# Patient Record
Sex: MALE | Race: WHITE | Employment: OTHER | URBAN - METROPOLITAN AREA
[De-identification: names, ages, dates, MRNs, and addresses within clinical notes are randomized per-mention and may not be internally consistent; named-entity substitution may affect disease eponyms.]

---

## 2017-01-12 ENCOUNTER — OFFICE VISIT (OUTPATIENT)
Dept: UROLOGY | Age: 82
End: 2017-01-12

## 2017-01-12 VITALS
TEMPERATURE: 97.9 F | OXYGEN SATURATION: 98 % | HEIGHT: 63 IN | BODY MASS INDEX: 23.04 KG/M2 | DIASTOLIC BLOOD PRESSURE: 70 MMHG | WEIGHT: 130 LBS | HEART RATE: 76 BPM | SYSTOLIC BLOOD PRESSURE: 130 MMHG

## 2017-01-12 DIAGNOSIS — C67.9 BLADDER CARCINOMA (HCC): ICD-10-CM

## 2017-01-12 DIAGNOSIS — N40.0 BPH WITHOUT URINARY OBSTRUCTION: ICD-10-CM

## 2017-01-12 DIAGNOSIS — R32 INCONTINENCE: Primary | ICD-10-CM

## 2017-01-12 LAB
BILIRUB UR QL STRIP: NEGATIVE
GLUCOSE UR-MCNC: NEGATIVE MG/DL
KETONES P FAST UR STRIP-MCNC: NEGATIVE MG/DL
PH UR STRIP: 5 [PH] (ref 4.6–8)
PROT UR QL STRIP: NEGATIVE MG/DL
SP GR UR STRIP: 1.01 (ref 1–1.03)
UA UROBILINOGEN AMB POC: NORMAL (ref 0.2–1)
URINALYSIS CLARITY POC: CLEAR
URINALYSIS COLOR POC: YELLOW
URINE BLOOD POC: NEGATIVE
URINE LEUKOCYTES POC: NEGATIVE
URINE NITRITES POC: NEGATIVE

## 2017-01-12 NOTE — PATIENT INSTRUCTIONS
Bladder Cancer: Care Instructions  Your Care Instructions  Bladder cancer occurs when abnormal cells grow out of control in the bladder. It usually can be cured when it is found early. It is more common in older people. Treatment may include surgery to remove part of the bladder. If the tumor is large, the entire bladder may be removed. You may also have radiation or chemotherapy to kill the cancer cells. Sometimes people get treatment with medicines that help the body's natural defenses, or immune system, fight the cancer. Finding out that you have cancer is scary. You may feel many emotions and may need some help coping. Seek out family, friends, and counselors for support. You also can do things at home to make yourself feel better while you go through treatment. Call the Flirtic.com (6-212.182.9034) or visit its website at [address] for more information. Follow-up care is a key part of your treatment and safety. Be sure to make and go to all appointments, and call your doctor if you are having problems. It's also a good idea to know your test results and keep a list of the medicines you take. How can you care for yourself at home? · Take your medicines exactly as prescribed. Call your doctor if you think you are having a problem with your medicine. You may get medicine for nausea and vomiting if you have these side effects. · Eat healthy food. If you are not hungry, try to eat food that has protein and extra calories to keep up your strength and prevent weight loss. Drink liquid meal replacements for extra calories and protein. Try to eat your main meal early. · Get some physical activity every day, but do not get too tired. Keep doing the hobbies you enjoy as your energy allows. · Take steps to control your stress and workload. Learn relaxation techniques. ¨ Share your feelings. Stress and tension affect our emotions.  By expressing your feelings to others, you may be able to understand and cope with them. ¨ Consider joining a support group. Talking about a problem with your spouse, a good friend, or other people with similar problems is a good way to reduce tension and stress. ¨ Express yourself through art. Try writing, dance, art, or crafts to relieve tension. Some dance, writing, or art groups may be available just for people who have cancer. ¨ Be kind to your body and mind. Getting enough sleep, eating a healthy diet, and taking time to do things you enjoy can contribute to an overall feeling of balance in your life and help reduce stress. ¨ Get help if you need it. Discuss your concerns with your doctor or counselor. · If you are vomiting or have diarrhea:  ¨ Drink plenty of fluids (enough so that your urine is light yellow or clear like water) to prevent dehydration. Choose water and other caffeine-free clear liquids. If you have kidney, heart, or liver disease and have to limit fluids, talk with your doctor before you increase the amount of fluids you drink. ¨ When you are able to eat, try clear soups, mild foods, and liquids until all symptoms are gone for 12 to 48 hours. Other good choices include dry toast, crackers, cooked cereal, and gelatin dessert, such as Jell-O.  · Take care of your urinary tract to prevent problems such as infection, which can be caused by bladder cancer and its treatment. Limit drinks with caffeine, drink plenty of fluids, and urinate every 3 to 4 hours. · If you have not already done so, prepare a list of advance directives. Advance directives are instructions to your doctor and family members about what kind of care you want if you become unable to speak for yourself. When should you call for help? Call 911 anytime you think you may need emergency care. For example, call if:  · You passed out (lost consciousness). · You have severe belly pain.   Call your doctor now or seek immediate medical care if:  · Vomiting lasts longer than 24 hours and you are not able to keep down fluids. · You have symptoms of a urinary infection. For example:  ¨ You have blood or pus in your urine. ¨ You have pain in your back just below your rib cage. This is called flank pain. ¨ You have a fever, chills, or body aches. ¨ It hurts to urinate. ¨ You have groin or belly pain. Watch closely for changes in your health, and be sure to contact your doctor if:  · You are not able to eat well and are losing weight. · You feel more tired than usual.  Where can you learn more? Go to http://mary [email or web address] [email or web address].info/. Enter M796 in the search box to learn more about \"Bladder Cancer: Care Instructions. \"  Current as of: July 26, 2016  Content Version: 11.1  © 2213-6613 [employer]. Care instructions adapted under license by [employer] (which disclaims liability or warranty for this information). If you have questions about a medical condition or this instruction, always ask your healthcare professional. [place] [address] any warranty or liability for your use of this information. Orgoo Activation    Thank you for requesting access to Orgoo. Please follow the instructions below to securely access and download your online medical record. Orgoo allows you to send messages to your doctor, view your test results, renew your prescriptions, schedule appointments, and more. How Do I Sign Up? 1. In your internet browser, go to www.The Echo Nest  2. Click on the First Time User? Click Here link in the Sign In box. You will be redirect to the New Member Sign Up page. 3. Enter your Orgoo Access Code exactly as it appears below. You will not need to use this code after youve completed the sign-up process. If you do not sign up before the expiration date, you must request a new code.     Orgoo Access Code: [email or web address]  Expires: 4/12/2017  2:44 PM (This is the date your Orgoo access code will )    4. Enter the last four digits of your Social Security Number (xxxx) and Date of Birth (mm/dd/yyyy) as indicated and click Submit. You will be taken to the next sign-up page. 5. Create a FundedByMe ID. This will be your FundedByMe login ID and cannot be changed, so think of one that is secure and easy to remember. 6. Create a FundedByMe password. You can change your password at any time. 7. Enter your Password Reset Question and Answer. This can be used at a later time if you forget your password. 8. Enter your e-mail address. You will receive e-mail notification when new information is available in [address] 9. Click Sign Up. You can now view and download portions of your medical record. 10. Click the Download Summary menu link to download a portable copy of your medical information. Additional Information    If you have questions, please visit the Frequently Asked Questions section of the FundedByMe website at [email or web address] Remember, FundedByMe is NOT to be used for urgent needs. For medical emergencies, dial 911.

## 2017-01-12 NOTE — PROGRESS NOTES
Mr. Nallely Samano has a reminder for a \"due or due soon\" health maintenance. I have asked that he contact his primary care provider for follow-up on this health maintenance.

## 2017-01-13 NOTE — PROGRESS NOTES
Fawn Passer 80 y.o. male     Mr. 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    PSA 0.6 in August 2016    Negative surveillance cystoscopy in September 2016-bladder findings consistent with bladder outlet obstruction from BPH      Review of Systems:   CNS: No seizures syncope headaches or dizziness no visual changes  Respiratory: No wheezing no shortness of breath  Cardiovascular: Hypertension  Intestinal: No dyspepsia diarrhea or constipation  Urinary: Symptomatic BPH status post TURP 1986/  Skeletal: Muscle aches and pains chronic low back pain/large joint arthritis  Endocrine: No diabetes or thyroid disease  Other:      Allergies: Allergies   Allergen Reactions    Aspirin Other (comments)     Upset stomach        Medications:    Current Outpatient Prescriptions   Medication Sig Dispense Refill    KLOR-CON 10 10 mEq tablet       valsartan-hydrochlorothiazide (DIOVAN-HCT) 320-12.5 mg per tablet       CRESTOR 20 mg tablet       MULTIVIT-MINERALS/FOLIC ACID (ADULT MULTIVITAMIN GUMMIES PO) Take  by mouth.  gabapentin (NEURONTIN) 100 mg capsule          Past Medical History   Diagnosis Date    Hypertension       History reviewed. No pertinent past surgical history. History reviewed. No pertinent family history.      Physical Examination: Well-nourished mature male in no apparent distress  Normal prostate by CIARAN in August 2016    Urinalysis: Negative dipstick/nitrite negative      PVR today 47 cc    Impression: Mild urinary incontinence-suspect CNS mechanism                       -BPH                       -Low-grade bladder carcinoma    Plan: Reassurance           - recommend use of adult diapers    rtc 9 mo surveillance cystoscopy      More than 1/2 of this 15 minute visit was spent in counselling and coordination of care, as described above. Clarisa Parekh MD  -electronically signed-    PLEASE NOTE:  This document has been produced using voice recognition software. Unrecognized errors in transcription may be present.

## 2017-04-11 ENCOUNTER — HOSPITAL ENCOUNTER (OUTPATIENT)
Dept: LAB | Age: 82
Discharge: HOME | End: 2017-04-11
Payer: MEDICARE

## 2017-04-11 PROCEDURE — 88304 TISSUE EXAM BY PATHOLOGIST: CPT | Performed by: OPHTHALMOLOGY

## 2017-04-12 PROCEDURE — 88305 TISSUE EXAM BY PATHOLOGIST: CPT | Performed by: OPHTHALMOLOGY

## 2017-05-31 ENCOUNTER — HOSPITAL ENCOUNTER (OUTPATIENT)
Dept: LAB | Age: 82
Discharge: HOME | End: 2017-05-31
Payer: MEDICARE

## 2017-05-31 DIAGNOSIS — Z01.812 BLOOD TESTS PRIOR TO TREATMENT OR PROCEDURE: ICD-10-CM

## 2017-05-31 LAB
ALBUMIN SERPL BCP-MCNC: 3.9 G/DL (ref 3.4–5)
ALBUMIN/GLOB SERPL: 1.4 {RATIO} (ref 0.8–1.7)
ALP SERPL-CCNC: 76 U/L (ref 45–117)
ALT SERPL-CCNC: 27 U/L (ref 16–61)
ANION GAP BLD CALC-SCNC: 6 MMOL/L (ref 3–18)
AST SERPL W P-5'-P-CCNC: 23 U/L (ref 15–37)
ATRIAL RATE: 56 BPM
BILIRUB SERPL-MCNC: 0.5 MG/DL (ref 0.2–1)
BUN SERPL-MCNC: 27 MG/DL (ref 7–18)
BUN/CREAT SERPL: 30 (ref 12–20)
CALCIUM SERPL-MCNC: 9.4 MG/DL (ref 8.5–10.1)
CALCULATED P AXIS, ECG09: 67 DEGREES
CALCULATED R AXIS, ECG10: -3 DEGREES
CALCULATED T AXIS, ECG11: 48 DEGREES
CHLORIDE SERPL-SCNC: 105 MMOL/L (ref 100–108)
CO2 SERPL-SCNC: 30 MMOL/L (ref 21–32)
CREAT SERPL-MCNC: 0.91 MG/DL (ref 0.6–1.3)
DIAGNOSIS, 93000: NORMAL
ERYTHROCYTE [DISTWIDTH] IN BLOOD BY AUTOMATED COUNT: 14.3 % (ref 11.6–14.5)
GLOBULIN SER CALC-MCNC: 2.7 G/DL (ref 2–4)
GLUCOSE SERPL-MCNC: 85 MG/DL (ref 74–99)
HCT VFR BLD AUTO: 42.9 % (ref 36–48)
HGB BLD-MCNC: 14.4 G/DL (ref 13–16)
MCH RBC QN AUTO: 32.9 PG (ref 24–34)
MCHC RBC AUTO-ENTMCNC: 33.6 G/DL (ref 31–37)
MCV RBC AUTO: 97.9 FL (ref 74–97)
P-R INTERVAL, ECG05: 200 MS
PLATELET # BLD AUTO: 196 K/UL (ref 135–420)
PMV BLD AUTO: 11 FL (ref 9.2–11.8)
POTASSIUM SERPL-SCNC: 4.1 MMOL/L (ref 3.5–5.5)
PROT SERPL-MCNC: 6.6 G/DL (ref 6.4–8.2)
Q-T INTERVAL, ECG07: 414 MS
QRS DURATION, ECG06: 104 MS
QTC CALCULATION (BEZET), ECG08: 399 MS
RBC # BLD AUTO: 4.38 M/UL (ref 4.7–5.5)
SODIUM SERPL-SCNC: 141 MMOL/L (ref 136–145)
VENTRICULAR RATE, ECG03: 56 BPM
WBC # BLD AUTO: 11.6 K/UL (ref 4.6–13.2)

## 2017-05-31 PROCEDURE — 93005 ELECTROCARDIOGRAM TRACING: CPT

## 2017-05-31 PROCEDURE — 36415 COLL VENOUS BLD VENIPUNCTURE: CPT

## 2017-05-31 PROCEDURE — 85027 COMPLETE CBC AUTOMATED: CPT

## 2017-05-31 PROCEDURE — 80053 COMPREHEN METABOLIC PANEL: CPT

## 2017-06-26 ENCOUNTER — HOSPITAL ENCOUNTER (OUTPATIENT)
Dept: LAB | Age: 82
Discharge: HOME | End: 2017-06-26
Payer: MEDICARE

## 2017-06-26 PROCEDURE — 88305 TISSUE EXAM BY PATHOLOGIST: CPT

## 2017-06-26 PROCEDURE — 88331 PATH CONSLTJ SURG 1 BLK 1SPC: CPT

## 2017-09-07 ENCOUNTER — OFFICE VISIT (OUTPATIENT)
Dept: UROLOGY | Age: 82
End: 2017-09-07

## 2017-09-07 ENCOUNTER — HOSPITAL ENCOUNTER (OUTPATIENT)
Dept: LAB | Age: 82
Discharge: HOME | End: 2017-09-07
Payer: MEDICARE

## 2017-09-07 VITALS
HEART RATE: 73 BPM | OXYGEN SATURATION: 98 % | HEIGHT: 63 IN | SYSTOLIC BLOOD PRESSURE: 144 MMHG | DIASTOLIC BLOOD PRESSURE: 68 MMHG | TEMPERATURE: 97 F

## 2017-09-07 DIAGNOSIS — Z85.51 HISTORY OF BLADDER CANCER: Primary | ICD-10-CM

## 2017-09-07 DIAGNOSIS — N40.0 BENIGN NON-NODULAR PROSTATIC HYPERPLASIA, PRESENCE OF LOWER URINARY TRACT SYMPTOMS UNSPECIFIED: ICD-10-CM

## 2017-09-07 LAB
BILIRUB UR QL STRIP: NEGATIVE
GLUCOSE UR-MCNC: NEGATIVE MG/DL
KETONES P FAST UR STRIP-MCNC: NEGATIVE MG/DL
PH UR STRIP: 5 [PH] (ref 4.6–8)
PROT UR QL STRIP: NEGATIVE MG/DL
PSA SERPL-MCNC: 0.3 NG/ML (ref 0–4)
SP GR UR STRIP: 1.02 (ref 1–1.03)
UA UROBILINOGEN AMB POC: NORMAL (ref 0.2–1)
URINALYSIS CLARITY POC: CLEAR
URINALYSIS COLOR POC: YELLOW
URINE BLOOD POC: NEGATIVE
URINE LEUKOCYTES POC: NEGATIVE
URINE NITRITES POC: NEGATIVE

## 2017-09-07 PROCEDURE — 84153 ASSAY OF PSA TOTAL: CPT | Performed by: UROLOGY

## 2017-09-07 RX ORDER — FLUTICASONE PROPIONATE 50 MCG
SPRAY, SUSPENSION (ML) NASAL
COMMUNITY
Start: 2017-08-18 | End: 2020-01-01

## 2017-09-07 RX ORDER — ERYTHROMYCIN 5 MG/G
OINTMENT OPHTHALMIC
COMMUNITY
Start: 2017-08-18 | End: 2020-01-01

## 2017-09-07 NOTE — PROGRESS NOTES
Mr. Marlen Garces has a reminder for a \"due or due soon\" health maintenance. I have asked that he contact his primary care provider for follow-up on this health maintenance. [facility]  OFFICE PROCEDURE PROGRESS NOTE        Chart reviewed for the following:   IMimi LPN, have reviewed the History, Physical and updated the Allergic reactions for [address] [place] [address] 21 performed immediately prior to start of procedure:   Jessica Bond LPN, have performed the following reviews on Shannen Nunez prior to the start of the procedure:            * Patient was identified by name and date of birth   * Agreement on procedure being performed was verified  * Risks and Benefits explained to the patient  * Procedure site verified and marked as necessary  * Patient was positioned for comfort  * Consent was signed and verified     Time: 15:05      Date of procedure: 9/7/2017    Procedure performed by:  Álvaro Hauser MD    Provider assisted by: Livan German LPN    Patient assisted by: self    How tolerated by patient: tolerated the procedure well with no complications    Post Procedural Pain Scale: 0 - No Hurt    Comments: Patient verbalized understanding of procedure and post procedure instructions.

## 2017-09-07 NOTE — MR AVS SNAPSHOT
Visit Information Date & Time Provider Department Dept. Phone Encounter #  
 9/7/2017  2:30 PM Yvonne Armstrong, [address] [place] [address] [facility] [phone] Your Appointments 9/14/2017 11:00 AM  
New Patient with Darren Cannon MD  
[address] [facility], [address] and [facility] ONE [address]) Appt Note: BACK PAIN - pt missed 8/5 appt [address] [facility] [address]  
[phone]  
  
   
 [address] [facility],[address] [place]  
  
    
 10/19/2017  9:15 AM  
New Patient with Murtaza Tobias MD  
[address] [facility], [address] and [facility] - [facility] [address]) Appt Note: LT WRIST/GROWTH OK PER STUART TO SEE HIM. [address] [place], [address] [facility] [address] [phone]  
  
   
 [address] [facility], [address] [place] [address] Road [facility] Upcoming Health Maintenance Date Due DTaP/Tdap/Td series (1 - Tdap) [birth year]4/1950 ZOSTER VACCINE AGE 60> 4/4/1989 GLAUCOMA SCREENING Q2Y 6/4/1994 Pneumococcal 65+ High/Highest Risk (1 of 2 - PCV13) 6/4/1994 MEDICARE YEARLY EXAM 6/4/1994 INFLUENZA AGE 9 TO ADULT 8/1/2017 Allergies as of 9/7/2017  Review Complete On: 9/7/2017 By: Mary Nixon LPN Severity Noted Reaction Type Reactions Aspirin  08/25/2016    Other (comments) Upset stomach Current Immunizations  Never Reviewed No immunizations on file. Not reviewed this visit You Were Diagnosed With   
  
 Codes Comments History of bladder cancer    -  Primary ICD-10-CM: Z85.51 
ICD-9-CM: V10.51 Benign non-nodular prostatic hyperplasia, presence of lower urinary tract symptoms unspecified     ICD-10-CM: N40.0 ICD-9-CM: 600.90 Vitals BP Pulse Temp Height(growth percentile) SpO2 Smoking Status 144/68 (BP 1 Location: Left arm, BP Patient Position: Sitting) 73 97 °F (36.1 °C) 5' 2.6\" (1.59 m) 98% Never Smoker Vitals History Preferred Pharmacy Pharmacy Name Phone [facility] [address] [facility] [address] [phone] Your Updated Medication List  
  
   
This list is accurate as of: 9/7/17  3:16 PM.  Always use your most recent med list.  
  
  
  
  
 ADULT MULTIVITAMIN GUMMIES PO Take  by mouth. CRESTOR 20 mg tablet Generic drug:  rosuvastatin  
  
 erythromycin ophthalmic ointment Commonly known as:  ILOTYCIN  
  
 fluticasone 50 mcg/actuation nasal spray Commonly known as:  FLONASE  
  
 gabapentin 100 mg capsule Commonly known as:  NEURONTIN  
  
 KLOR-CON 10 10 mEq tablet Generic drug:  potassium chloride SR  
  
 valsartan-hydroCHLOROthiazide 320-12.5 mg per tablet Commonly known as:  DIOVAN-HCT We Performed the Following AMB POC URINALYSIS DIP STICK AUTO W/O MICRO [40942 CPT(R)] WI COLLECTION VENOUS BLOOD,VENIPUNCTURE [id number] CPT(R)] To-Do List   
 09/07/2017 Lab:  PSA, DIAGNOSTIC (PROSTATE SPECIFIC AG) Patient Instructions Cystoscopy: Care Instructions Your Care Instructions Cystoscopy is a test. It uses a thin, lighted tube called a cystoscope to see the inside of the bladder and the urethra. The urethra is the tube that carries urine out of the body. This test is helpful because it lets your doctor see areas of your bladder and urethra that are hard to see on X-rays. It can help your doctor find bladder stones, tumors, bleeding, and infection. During this test, your doctor also can take tissue and urine samples. And if your doctor finds small stones or growths, he or she can remove them. In most cases the scope is in the bladder for less than 10 minutes. But the entire test may take 45 minutes or longer. You will probably get local anesthesia. This numbs a small part of your body. Or you may get spinal anesthesia, which numbs more of your body. Once in a while, doctors use general anesthesia. It makes you sleep during surgery. If you get a local anesthetic, you may be able to get up right after the test. But if you had spinal or general anesthesia, you will stay in the recovery room until you are able to walk or you have feeling again in your lower body. This usually takes about an hour. Your doctor may be able to tell you some of the results right after the test. But the complete results may take several days. Follow-up care is a key part of your treatment and safety. Be sure to make and go to all appointments, and call your doctor if you are having problems. It's also a good idea to know your test results and keep a list of the medicines you take. How can you care for yourself at home? Before the test 
· If you are having a local anesthetic, you can eat and drink before the test. 
· If you are having a spinal or general anesthetic, do not eat or drink anything for at least 8 hours before the test. Tell your doctor what medicines you take. · If you are not staying overnight in the hospital, make sure you have someone who can drive you home after the test. 
After the test 
· If your doctor prescribed antibiotics, take them as directed. Do not stop taking them just because you feel better. You need to take the full course of antibiotics. · You may have some burning when you urinate for a day or two after the test. You may feel better if you drink more fluids. This may also help prevent an infection. · Your urine may have a pinkish color for a few days after the test. 
When should you call for help? Call your doctor now or seek immediate medical care if: 
· Your urine is still red or you see blood clots after you have urinated several times. · You cannot pass urine 8 hours after the test. 
· You get a fever or chills. · You have pain in your belly or your back just below your rib cage. This is also called flank pain. Watch closely for changes in your health, and be sure to contact your doctor if: · You have pain or burning when you urinate. A burning sensation is normal for a day or two after the test. But call if it does not get better. · You have a frequent urge to urinate but can pass only small amounts of urine. · Your urine is pink, red, or cloudy or smells bad. It is normal for the urine to have a pinkish color for a few days after the test. But call if it does not get better. · You do not get better as expected. Where can you learn more? Go to http://mary lou-bailey.info/. Enter G110 in the search box to learn more about \"Cystoscopy: Care Instructions. \" Current as of: August 12, 2016 Content Version: 11.3 © 8920-3299 [employer]. Care instructions adapted under license by [employer] (which disclaims liability or warranty for this information). If you have questions about a medical condition or this instruction, always ask your healthcare professional. [address] any warranty or liability for your use of this information. Introducing [facility]! Nathaniel Moscoso introduces IES patient portal. Now you can access parts of your medical record, email your doctor's office, and request medication refills online. 1. In your internet browser, go to https://Local Offer Network. Meldium/Local Offer Network 2. Click on the First Time User? Click Here link in the Sign In box. You will see the New Member Sign Up page. 3. Enter your IES Access Code exactly as it appears below. You will not need to use this code after youve completed the sign-up process. If you do not sign up before the expiration date, you must request a new code. · IES Access Code: [email or web address] Expires: 11/29/2017 12:27 PM 
 
4. Enter the last four digits of your Social Security Number (xxxx) and Date of Birth (mm/dd/yyyy) as indicated and click Submit. You will be taken to the next sign-up page. 5. Create a Volance ID. This will be your Volance login ID and cannot be changed, so think of one that is secure and easy to remember. 6. Create a Volance password. You can change your password at any time. 7. Enter your Password Reset Question and Answer. This can be used at a later time if you forget your password. 8. Enter your e-mail address. You will receive e-mail notification when new information is available in [address] 9. Click Sign Up. You can now view and download portions of your medical record. 10. Click the Download Summary menu link to download a portable copy of your medical information. If you have questions, please visit the Frequently Asked Questions section of the Volance website. Remember, Volance is NOT to be used for urgent needs. For medical emergencies, dial 911. Now available from your iPhone and Android! Please provide this summary of care documentation to your next provider. Your primary care clinician is listed as [facility] Morales. If you have any questions after today's visit, please call [phone].

## 2017-09-07 NOTE — PROGRESS NOTES
Shannen Shortrita 80 y.o. male     Mr. 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     PSA 0.6 in August 2016     Negative surveillance cystoscopy in September 2016-bladder findings consistent with bladder outlet obstruction from BPH        Review of Systems:   CNS: No seizures syncope headaches or dizziness no visual changes  Respiratory: No wheezing no shortness of breath  Cardiovascular: Hypertension  Intestinal: No dyspepsia diarrhea or constipation  Urinary: Symptomatic BPH status post TURP 1986/  Skeletal: Muscle aches and pains chronic low back pain/large joint arthritis  Endocrine: No diabetes or thyroid disease  Other:     Allergies: Allergies   Allergen Reactions    Aspirin Other (comments)     Upset stomach        Medications:    Current Outpatient Prescriptions   Medication Sig Dispense Refill    KLOR-CON 10 10 mEq tablet       valsartan-hydrochlorothiazide (DIOVAN-HCT) 320-12.5 mg per tablet       gabapentin (NEURONTIN) 100 mg capsule       CRESTOR 20 mg tablet       MULTIVIT-MINERALS/FOLIC ACID (ADULT MULTIVITAMIN GUMMIES PO) Take  by mouth.  erythromycin (ILOTYCIN) ophthalmic ointment       fluticasone (FLONASE) 50 mcg/actuation nasal spray          Past Medical History:   Diagnosis Date    Hypertension       No past surgical history on file. No family history on file.      Physical Examination: Well-nourished mature male in no apparent distress    prostate by CIARAN is smooth rounded benign in consistency and nontender-no nodularity no induration   no rectal masses induration or tenderness      Urinalysis: Negative dipstick/nitrite negative    Cystoscopy Report: After prepping the glans penis and instilling 2% lidocaine jelly intraurethrally a flexible cystoscope was passed through the urethra into the bladder revealing normal urothelium of the bladder and urethra. There are no stones, tumors, strictures, or diverticuli evident. There is moderate trabeculation with small cellule formation but no signs of active bladder outlet obstruction. Both ureteral orifices are slitlike in appearance with clear urine jets observed from both sides. Normal blood vessel architecture in the bladder-no hemorrhages, injection, or friability evident. The prostatic channel is well excavated with a patent bladder neck. Procedure was uncomplicated well-tolerated        Impression: History of bladder tumor-normal cystoscopy-FRANNIE 3 years status post TURBT/BCG                       -Symptomatic BPH status post TURP ×2      Plan: Cipro 500 mg twice daily ×3 days    PSA today    rtc 1 yr PSA CIARAN surveillance cystoscopy      More than 1/2 of this 25 minute visit was spent in counselling and coordination of care, as described above. Merlyn Hartman MD  -electronically signed-    PLEASE NOTE:  This document has been produced using voice recognition software. Unrecognized errors in transcription may be present.

## 2017-09-07 NOTE — PATIENT INSTRUCTIONS
Cystoscopy: Care Instructions  Your Care Instructions  Cystoscopy is a test. It uses a thin, lighted tube called a cystoscope to see the inside of the bladder and the urethra. The urethra is the tube that carries urine out of the body. This test is helpful because it lets your doctor see areas of your bladder and urethra that are hard to see on X-rays. It can help your doctor find bladder stones, tumors, bleeding, and infection. During this test, your doctor also can take tissue and urine samples. And if your doctor finds small stones or growths, he or she can remove them. In most cases the scope is in the bladder for less than 10 minutes. But the entire test may take 45 minutes or longer. You will probably get local anesthesia. This numbs a small part of your body. Or you may get spinal anesthesia, which numbs more of your body. Once in a while, doctors use general anesthesia. It makes you sleep during surgery. If you get a local anesthetic, you may be able to get up right after the test. But if you had spinal or general anesthesia, you will stay in the recovery room until you are able to walk or you have feeling again in your lower body. This usually takes about an hour. Your doctor may be able to tell you some of the results right after the test. But the complete results may take several days. Follow-up care is a key part of your treatment and safety. Be sure to make and go to all appointments, and call your doctor if you are having problems. It's also a good idea to know your test results and keep a list of the medicines you take. How can you care for yourself at home? Before the test  · If you are having a local anesthetic, you can eat and drink before the test.  · If you are having a spinal or general anesthetic, do not eat or drink anything for at least 8 hours before the test. Tell your doctor what medicines you take.   · If you are not staying overnight in the hospital, make sure you have someone who can drive you home after the test.  After the test  · If your doctor prescribed antibiotics, take them as directed. Do not stop taking them just because you feel better. You need to take the full course of antibiotics. · You may have some burning when you urinate for a day or two after the test. You may feel better if you drink more fluids. This may also help prevent an infection. · Your urine may have a pinkish color for a few days after the test.  When should you call for help? Call your doctor now or seek immediate medical care if:  · Your urine is still red or you see blood clots after you have urinated several times. · You cannot pass urine 8 hours after the test.  · You get a fever or chills. · You have pain in your belly or your back just below your rib cage. This is also called flank pain. Watch closely for changes in your health, and be sure to contact your doctor if:  · You have pain or burning when you urinate. A burning sensation is normal for a day or two after the test. But call if it does not get better. · You have a frequent urge to urinate but can pass only small amounts of urine. · Your urine is pink, red, or cloudy or smells bad. It is normal for the urine to have a pinkish color for a few days after the test. But call if it does not get better. · You do not get better as expected. Where can you learn more? Go to http://mary lou-bailey.info/. Enter S714 in the search box to learn more about \"Cystoscopy: Care Instructions. \"  Current as of: August 12, 2016  Content Version: 11.3  © 3722-3669 [employer]. Care instructions adapted under license by [employer] (which disclaims liability or warranty for this information). If you have questions about a medical condition or this instruction, always ask your healthcare professional. [address] any warranty or liability for your use of this information.

## 2017-09-14 ENCOUNTER — OFFICE VISIT (OUTPATIENT)
Dept: ORTHOPEDIC SURGERY | Age: 82
End: 2017-09-14

## 2017-09-14 VITALS — BODY MASS INDEX: 20.73 KG/M2 | HEIGHT: 66 IN | WEIGHT: 129 LBS

## 2017-09-14 DIAGNOSIS — M54.2 NECK PAIN: ICD-10-CM

## 2017-09-14 DIAGNOSIS — M47.899 FACET SYNDROME: Primary | ICD-10-CM

## 2017-09-14 DIAGNOSIS — M54.12 CERVICAL RADICULOPATHY: ICD-10-CM

## 2017-09-14 RX ORDER — MELOXICAM 15 MG/1
15 TABLET ORAL DAILY
Qty: 30 TAB | Refills: 0 | Status: SHIPPED | OUTPATIENT
Start: 2017-09-14 | End: 2020-01-01 | Stop reason: ALTCHOICE

## 2017-09-14 NOTE — PROGRESS NOTES
[address], [address]  [place], [address] [place] [address] Street  Phone: [phone]  Fax: [phone]        Ruthann Antony  : 1929  PCP: Kevin Cadena MD  2017    NEW PATIENT      ASSESSMENT AND PLAN     Carol Pakr comes in to the office today c/o chronic neck pain with numbness radiating into the right upper extremity. His symptoms are likely related to cervical and lumbar facet arthropathy. There could also be a component of cervical radiculopathy. On the examination he had a positive right Spurling's sign with decreased cervical ROM. I referred him to get a cervical MRI. I prescribed him Mobic 15 mg daily for his arthritic pain. Pt will f/u in 2 weeks or sooner if needed. Diagnoses and all orders for this visit:    1. Facet syndrome    2. Neck pain  -     [92825] C Spine 2-3V    3. Cervical radiculopathy       Follow-up Disposition:  Return in about 2 weeks (around 2017), or if symptoms worsen or fail to improve. CHIEF COMPLAINT  Carol Park is seen today in consultation at the request of Kevin Cadena MD for complaints of neck pain. HISTORY OF PRESENT ILLNESS  Carol Park is a 80 y.o. male c/o chronic neck, hip, and right shoulder pain that is currently rated at an 4/10. He reports numbness along the anterior aspect of the right upper extremity and ends in the first three fingers. He previously had cervical interlaminar injections and PT without improvement. He reports stiffness along the axial spine that is more prominent in the low back which has decreased his mobility. He states that prolonged standing and walking will exacerbate his symptoms. He notes that sitting and lying in the supine position will decrease his pain. Pt denies any fevers, chills, nausea, vomiting. Pt denies any chest pain and shortness of breath. Pt denies any ear, nose, and throat problems.  Pt denies any fecal or urinary incontinence. PAST MEDICAL HISTORY   Past Medical History:   Diagnosis Date    Bladder cancer ([facility] [address]     Hypertension        Past Surgical History:   Procedure Laterality Date    HX CAROTID ENDARTERECTOMY      HX ROTATOR CUFF REPAIR Right        MEDICATIONS      Current Outpatient Prescriptions   Medication Sig Dispense Refill    valsartan-hydrochlorothiazide (DIOVAN-HCT) 320-12.5 mg per tablet       CRESTOR 20 mg tablet       MULTIVIT-MINERALS/FOLIC ACID (ADULT MULTIVITAMIN GUMMIES PO) Take  by mouth.  erythromycin (ILOTYCIN) ophthalmic ointment       fluticasone (FLONASE) 50 mcg/actuation nasal spray       KLOR-CON 10 10 mEq tablet       gabapentin (NEURONTIN) 100 mg capsule          ALLERGIES    Allergies   Allergen Reactions    Aspirin Other (comments)     Upset stomach            SOCIAL HISTORY    Social History     Social History    Marital status:      Spouse name: N/A    Number of children: N/A    Years of education: N/A     Social History Main Topics    Smoking status: Never Smoker    Smokeless tobacco: Never Used    Alcohol use No    Drug use: No    Sexual activity: No     Other Topics Concern    None     Social History Narrative       FAMILY HISTORY  History reviewed. No pertinent family history. REVIEW OF SYSTEMS  Review of Systems   Constitutional: Negative for chills, diaphoresis, fever, malaise/fatigue and weight loss. Respiratory: Negative for shortness of breath. Cardiovascular: Negative for chest pain and leg swelling. Gastrointestinal: Negative for constipation, nausea and vomiting. Neurological: Negative for dizziness, tingling, seizures, loss of consciousness, weakness and headaches. Psychiatric/Behavioral: The patient does not have insomnia.           PHYSICAL EXAMINATION  Visit Vitals    Ht 5' 6\" (1.676 m)    Wt 129 lb (58.5 kg)    BMI 20.82 kg/m2         Pain Assessment  9/14/2017   Location of Pain Arm;Neck   Location Modifiers Right Severity of Pain 4   Quality of Pain Aching   Quality of Pain Comment numbness   Frequency of Pain Intermittent   Limiting Behavior No   Result of Injury No         Constitutional:  Well developed, well nourished, in no acute distress. Psychiatric: Affect and mood are appropriate. HEENT: Normocephalic, atraumatic. Extraocular movements intact. Integumentary: No rashes or abrasions noted on exposed areas. Cardiovascular: Regular rate and rhythm. Pulmonary: Clear to auscultation bilaterally. SPINE/MUSCULOSKELETAL EXAM    Cervical spine:  Neck is midline. Normal muscle tone. No focal atrophy is noted. Decreased ROM   Shoulder ROM intact. Mild tenderness to palpation. Positive right Spurling's sign. Negative Tinel's sign. Negative Phan's sign. Thoracic kyphosis   Cervical lordosis               Sensation in the bilateral arms grossly intact to light touch. Lumbar spine:  No rash, ecchymosis, or gross obliquity. No fasciculations. No focal atrophy is noted. No pain with hip ROM. Full range of motion. Mild tenderness to palpation. No tenderness to palpation at the sciatic notch. SI joints non-tender. Trochanters non tender. Stiffness/pain with back extension      Sensation in the bilateral legs grossly intact to light touch. MOTOR:      Biceps  Triceps Deltoids Wrist Ext Wrist Flex Hand Intrin   Right 5/5 5/5 5/5 5/5 5/5 5/5   Left 5/5 5/5 5/5 5/5 5/5 5/5             Hip Flex  Quads Hamstrings Ankle DF EHL Ankle PF   Right 5/5 5/5 5/5 5/5 5/5 5/5   Left 5/5 5/5 5/5 5/5 5/5 5/5     DTRs are 2+ biceps, triceps, brachioradialis, patella, and Achilles. Negative Straight Leg raise. Squat not tested. No difficulty with tandem gait. Trace ankle edema bilaterally     Ambulation without assistive device. FWB.       RADIOGRAPHS  Cervical XR images taken on 09/14/2017 personally reviewed with patient:  Severe disc degeneration at C5-6 and C6-7  Facet sclerosis  reviewed    Mr. Sagar Johnson has a reminder for a \"due or due soon\" health maintenance. I have asked that he contact his primary care provider for follow-up on this health maintenance. This plan was reviewed with the patient and patient agrees. All questions were answered. More than half of this visit today was spent on counseling. Written by Vito Marquez, as dictated by Dr. Reinier Seals. I, Dr. Reinier Seals, confirm that all documentation is accurate.

## 2017-09-14 NOTE — MR AVS SNAPSHOT
Visit Information Date & Time Provider Department Dept. Phone Encounter #  
 9/14/2017 11:00 AM Montez Cooks, MD [facility] [phone] [id number] Follow-up Instructions Return in about 2 weeks (around 9/28/2017), or if symptoms worsen or fail to improve. Your Appointments 10/19/2017  9:15 AM  
New Patient with Indra Martinez MD  
[address] [facility], [address] [facility]) Appt Note: LT WRIST/GROWTH OK PER STUART TO SEE HIM. [address] [address] [place], [address]  
[phone]  
  
   
 [address] [place] [address], [address] [place], [place] [address]ida [facility] [address]  
  
    
 9/6/2018  2:00 PM  
ULTRASOUND with Bette Armenta MD  
[facility]) Appt Note: PVR  
 [address] [place] [address] [address]  
[phone] [address] [facility] [address] Upcoming Health Maintenance Date Due DTaP/Tdap/Td series (1 - Tdap) 6/4/1950 ZOSTER VACCINE AGE 60> 4/4/1989 GLAUCOMA SCREENING Q2Y 6/4/1994 Pneumococcal 65+ High/Highest Risk (1 of 2 - PCV13) 6/4/1994 MEDICARE YEARLY EXAM 6/4/1994 INFLUENZA AGE 9 TO ADULT 8/1/2017 Allergies as of 9/14/2017  Review Complete On: 9/14/2017 By: Shilpa Loja Severity Noted Reaction Type Reactions Aspirin  08/25/2016    Other (comments) Upset stomach Current Immunizations  Never Reviewed No immunizations on file. Not reviewed this visit You Were Diagnosed With   
  
 Codes Comments Facet syndrome    -  Primary ICD-10-CM: M12.88 ICD-9-CM: 724.8 Neck pain     ICD-10-CM: M54.2 ICD-9-CM: 723.1 Cervical radiculopathy     ICD-10-CM: M54.12 
ICD-9-CM: 723.4 Vitals Height(growth percentile) Weight(growth percentile) BMI Smoking Status 5' 6\" (1.676 m) 129 lb (58.5 kg) 20.82 kg/m2 Never Smoker BMI and BSA Data Body Mass Index Body Surface Area  
 20.82 kg/m 2 1.65 m 2 Preferred Pharmacy Pharmacy Name Phone [facility] [address] [facility], [address] [place] [address] [phone] Your Updated Medication List  
  
   
This list is accurate as of: 9/14/17 12:40 PM.  Always use your most recent med list.  
  
  
  
  
 ADULT MULTIVITAMIN GUMMIES PO Take  by mouth. CRESTOR 20 mg tablet Generic drug:  rosuvastatin  
  
 erythromycin ophthalmic ointment Commonly known as:  ILOTYCIN  
  
 fluticasone 50 mcg/actuation nasal spray Commonly known as:  FLONASE  
  
 gabapentin 100 mg capsule Commonly known as:  NEURONTIN  
  
 KLOR-CON 10 10 mEq tablet Generic drug:  potassium chloride SR  
  
 valsartan-hydroCHLOROthiazide 320-12.5 mg per tablet Commonly known as:  DIOVAN-HCT We Performed the Following AMB POC XRAY, SPINE, CERVICAL; 2 OR 3 [23914 CPT(R)] Follow-up Instructions Return in about 2 weeks (around 9/28/2017), or if symptoms worsen or fail to improve. To-Do List   
 09/14/2017 Imaging:  MRI CERV SPINE WO CONT   
  
 09/15/2017 3:30 PM  
  Appointment with [facility] MRI  1 at [address] [place] [address] ([phone]) GENERAL INSTRUCTIONS  Bring information (ID card) if you have any medically implanted devices. You will be required to lie still while the procedure is being performed. Remove any jewelry (including body piercing, hairpins) prior to MRI. If you have had a creatinine level drawn within the past 30 days, please bring most recent results to your appt. Bring any films, CD's, and reports related to your study with you on the day of your exam.  This only includes studies done outside of [address] [place] [address], [place] [address], [place], and [facility].   Bring a complete list of all medications you are currently taking to include prescriptions, over-the-counter meds, herbals, vitamins & any dietary supplements. If you were given medications for claustrophobia or anxiety, please arrange to have someone drive you to your appointment. QUESTIONS  Notify the MRI Department if you have any questions concerning your study. Chalo Siemens - [phone] [address] - [phone] Introducing [facility] SERVICES! [employer] introduces [employer] patient portal. Now you can access parts of your medical record, email your doctor's office, and request medication refills online. 1. In your internet browser, go to [email or web address] 2. Click on the First Time User? Click Here link in the Sign In box. You will see the New Member Sign Up page. 3. Enter your [employer] Access Code exactly as it appears below. You will not need to use this code after youve completed the sign-up process. If you do not sign up before the expiration date, you must request a new code. · [employer] Access Code: [email or web address] Expires: 11/29/2017 12:27 PM 
 
4. Enter the last four digits of your Social Security Number (xxxx) and Date of Birth (mm/dd/yyyy) as indicated and click Submit. You will be taken to the next sign-up page. 5. Create a [employer] ID. This will be your [employer] login ID and cannot be changed, so think of one that is secure and easy to remember. 6. Create a [employer] password. You can change your password at any time. 7. Enter your Password Reset Question and Answer. This can be used at a later time if you forget your password. 8. Enter your e-mail address. You will receive e-mail notification when new information is available in [address] 9. Click Sign Up. You can now view and download portions of your medical record. 10. Click the Download Summary menu link to download a portable copy of your medical information. If you have questions, please visit the Frequently Asked Questions section of the [employer] website.  Remember, [employer] is NOT to be used for urgent needs. For medical emergencies, dial 911. Now available from your iPhone and Android! Please provide this summary of care documentation to your next provider. Your primary care clinician is listed as Nevin Apley. If you have any questions after today's visit, please call [phone].

## 2017-09-15 ENCOUNTER — HOSPITAL ENCOUNTER (OUTPATIENT)
Age: 82
Discharge: HOME | End: 2017-09-15
Attending: PHYSICAL MEDICINE & REHABILITATION
Payer: MEDICARE

## 2017-09-15 DIAGNOSIS — M54.2 NECK PAIN: ICD-10-CM

## 2017-09-15 DIAGNOSIS — M54.12 CERVICAL RADICULOPATHY: ICD-10-CM

## 2017-09-15 DIAGNOSIS — M47.899 FACET SYNDROME: ICD-10-CM

## 2017-09-15 PROCEDURE — 72141 MRI NECK SPINE W/O DYE: CPT

## 2017-09-29 ENCOUNTER — OFFICE VISIT (OUTPATIENT)
Dept: ORTHOPEDIC SURGERY | Age: 82
End: 2017-09-29

## 2017-09-29 VITALS
RESPIRATION RATE: 16 BRPM | BODY MASS INDEX: 20.99 KG/M2 | HEART RATE: 75 BPM | DIASTOLIC BLOOD PRESSURE: 53 MMHG | WEIGHT: 130.6 LBS | TEMPERATURE: 97.9 F | HEIGHT: 66 IN | OXYGEN SATURATION: 98 % | SYSTOLIC BLOOD PRESSURE: 122 MMHG

## 2017-09-29 DIAGNOSIS — M47.812 FACET ARTHROPATHY, CERVICAL: Primary | ICD-10-CM

## 2017-09-29 NOTE — PATIENT INSTRUCTIONS
Learning About Medial Branch Block and Neurotomy  What are medial branch block and neurotomy? Facet joints connect your vertebrae to each other. Problems in these joints can cause chronic (long-term) pain in the neck or back. They can sometimes affect the shoulders, arms, buttocks, or legs. Medial branch nerves are the nerves that carry many of the pain messages from your facet joints. Radiofrequency medial branch neurotomy is a type of medial branch neurotomy that is used to relieve arthritis pain. It uses radio waves to damage nerves in your neck or back so that they can no longer send pain messages to your brain. Before your doctor knows if a neurotomy will help you, he or she will do a medial branch block to find out if certain nerves are the ones that are a source of your pain. You will need two separate visits to the outpatient center or hospital to have both procedures. How is a medial branch block done? The doctor will use a tiny needle to numb the skin where you will get the block. Then he or she puts the block needle into the numbed area. You may feel some pressure, but you should not feel pain. Using fluoroscopy (live X-ray) to guide the needle, the doctor injects medicine onto one or more nerves to make them numb. If you get relief from your pain in the next 4 to 6 hours, it's a sign that those nerves may be contributing to your pain. The relief will last only a short time. You may then have a medial branch neurotomy at a later visit to try to get longer relief. It takes 20 to 30 minutes to get the block. You can go home after the doctor watches you for about an hour. You will get instructions on how to report how much pain you have when you are at home. You will need someone to drive you home. How is medial branch neurotomy done? The doctor will use a tiny needle to numb the skin where you will get the neurotomy. Then he or she puts the neurotomy needle into the numbed area.  You may feel some pressure. Using fluoroscopy (live X-ray) to guide the needle, the doctor sends radio waves through the needle to the nerve for 60 to 90 seconds. The radio waves heat the nerve, which damages it. The doctor may do this several times. And he or she may treat more than one nerve. It takes 45 to 90 minutes to get a neurotomy, depending on how many nerves are heated. You will probably go home 30 to 60 minutes later. You will need someone to drive you home. What can you expect after a neurotomy? You may feel a little sore or tender at the injection site at first. But after a successful neurotomy, most people have pain relief right away. It often lasts for 9 to 12 months or longer. Sometimes the pain relief is permanent. If your pain does come back, it may mean that the damaged nerve has healed and can send pain messages again. Or it can mean that a different nerve is causing pain. Your doctor will discuss your options with you. Follow-up care is a key part of your treatment and safety. Be sure to make and go to all appointments, and call your doctor if you are having problems. It's also a good idea to know your test results and keep a list of the medicines you take. Where can you learn more? Go to http://mary lou-bailey.info/. Enter Q171 in the search box to learn more about \"Learning About Medial Branch Block and Neurotomy. \"  Current as of: October 14, 2016  Content Version: 11.3  © 9637-3992 [employer]. Care instructions adapted under license by [employer] (which disclaims liability or warranty for this information). If you have questions about a medical condition or this instruction, always ask your healthcare professional. [place] [address] any warranty or liability for your use of this information.

## 2017-09-29 NOTE — MR AVS SNAPSHOT
Visit Information Date & Time Provider Department Dept. Phone Encounter #  
 9/29/2017  2:15 PM Gino Carlin MD [facility] [phone] [id number] Follow-up Instructions Return if symptoms worsen or fail to improve. Follow-up and Disposition History Your Appointments 10/19/2017  9:15 AM  
New Patient with Phyliss Habermann, MD  
[address] [facility], [address] [facility]  [address] [facility]) Appt Note: LT WRIST/GROWTH OK PER STUART TO SEE HIM. [address] [address], [address] [facility]  
[phone]  
  
   
 [address] [facility], [address] [address]  
  
    
 9/6/2018  2:00 PM  
ULTRASOUND with Jeni Shipman MD  
[facility] [facility]) Appt Note: PVR  
 [address] [place] [address] [address]  
[phone] [address] [place] [address] Upcoming Health Maintenance Date Due DTaP/Tdap/Td series (1 - Tdap) 6/4/1950 ZOSTER VACCINE AGE 60> 4/4/1989 GLAUCOMA SCREENING Q2Y 6/4/1994 Pneumococcal 65+ High/Highest Risk (1 of 2 - PCV13) 6/4/1994 MEDICARE YEARLY EXAM 6/4/1994 INFLUENZA AGE 9 TO ADULT 8/1/2017 Allergies as of 9/29/2017  Review Complete On: 9/29/2017 By: Gino Carlin MD  
  
 Severity Noted Reaction Type Reactions Aspirin  08/25/2016    Other (comments) Upset stomach Current Immunizations  Never Reviewed No immunizations on file. Not reviewed this visit You Were Diagnosed With   
  
 Codes Comments Facet arthropathy, cervical    -  Primary ICD-10-CM: M12.88 ICD-9-CM: 721.0 Vitals BP Pulse Temp Resp Height(growth percentile) Weight(growth percentile) 122/53 75 97.9 °F (36.6 °C) (Oral) 16 5' 6\" (1.676 m) 130 lb 9.6 oz (59.2 kg) SpO2 BMI Smoking Status 98% 21.08 kg/m2 Never Smoker BMI and BSA Data Body Mass Index Body Surface Area 21.08 kg/m 2 1.66 m 2 Preferred Pharmacy Pharmacy Name Phone [facility] [address] [phone] Your Updated Medication List  
  
   
This list is accurate as of: 9/29/17  3:15 PM.  Always use your most recent med list.  
  
  
  
  
 ADULT MULTIVITAMIN GUMMIES PO Take  by mouth. CRESTOR 20 mg tablet Generic drug:  rosuvastatin  
  
 erythromycin ophthalmic ointment Commonly known as:  ILOTYCIN  
  
 fluticasone 50 mcg/actuation nasal spray Commonly known as:  FLONASE  
  
 gabapentin 100 mg capsule Commonly known as:  NEURONTIN  
  
 KLOR-CON 10 10 mEq tablet Generic drug:  potassium chloride SR  
  
 meloxicam 15 mg tablet Commonly known as:  MOBIC Take 1 Tab by mouth daily. valsartan-hydroCHLOROthiazide 320-12.5 mg per tablet Commonly known as:  DIOVAN-HCT We Performed the Following REFERRAL TO PAIN MANAGEMENT [DRD275 Custom] Comments:  
 Cervical medial branch block / RFA Follow-up Instructions Return if symptoms worsen or fail to improve. To-Do List   
 10/02/2017 11:00 AM  
  [facility]ointment with [facility] at [facility] ([phone]) No preparation is required for this study. Patient can have their meals and take their medications. Patient should not wear a turtleneck or high neck shirt for this study. Please report to the main location @ [address] [place] [address] approximately 30 minutes prior to your appointment time. The entrance is located on the RIGHT side of the street, immediately adjacent to the Emergency Room. Referral Information Referral ID Referred By Referred To  
  
 [id number] Seamus White MD   
   [facility] [place], [address] Phone: [phone] Fax: [phone] Visits Status Start Date End Date 1 New Request 9/29/17 9/29/18 If your referral has a status of pending review or denied, additional information will be sent to support the outcome of this decision. Patient Instructions Learning About Medial Branch Block and Neurotomy What are medial branch block and neurotomy? Facet joints connect your vertebrae to each other. Problems in these joints can cause chronic (long-term) pain in the neck or back. They can sometimes affect the shoulders, arms, buttocks, or legs. Medial branch nerves are the nerves that carry many of the pain messages from your facet joints. Radiofrequency medial branch neurotomy is a type of medial branch neurotomy that is used to relieve arthritis pain. It uses radio waves to damage nerves in your neck or back so that they can no longer send pain messages to your brain. Before your doctor knows if a neurotomy will help you, he or she will do a medial branch block to find out if certain nerves are the ones that are a source of your pain. You will need two separate visits to the outpatient center or hospital to have both procedures. How is a medial branch block done? The doctor will use a tiny needle to numb the skin where you will get the block. Then he or she puts the block needle into the numbed area. You may feel some pressure, but you should not feel pain. Using fluoroscopy (live X-ray) to guide the needle, the doctor injects medicine onto one or more nerves to make them numb. If you get relief from your pain in the next 4 to 6 hours, it's a sign that those nerves may be contributing to your pain. The relief will last only a short time. You may then have a medial branch neurotomy at a later visit to try to get longer relief. It takes 20 to 30 minutes to get the block. You can go home after the doctor watches you for about an hour. You will get instructions on how to report how much pain you have when you are at home. You will need someone to drive you home. How is medial branch neurotomy done? The doctor will use a tiny needle to numb the skin where you will get the neurotomy. Then he or she puts the neurotomy needle into the numbed area. You may feel some pressure. Using fluoroscopy (live X-ray) to guide the needle, the doctor sends radio waves through the needle to the nerve for 60 to 90 seconds. The radio waves heat the nerve, which damages it. The doctor may do this several times. And he or she may treat more than one nerve. It takes 45 to 90 minutes to get a neurotomy, depending on how many nerves are heated. You will probably go home 30 to 60 minutes later. You will need someone to drive you home. What can you expect after a neurotomy? You may feel a little sore or tender at the injection site at first. But after a successful neurotomy, most people have pain relief right away. It often lasts for 9 to 12 months or longer. Sometimes the pain relief is permanent. If your pain does come back, it may mean that the damaged nerve has healed and can send pain messages again. Or it can mean that a different nerve is causing pain. Your doctor will discuss your options with you. Follow-up care is a key part of your treatment and safety. Be sure to make and go to all appointments, and call your doctor if you are having problems. It's also a good idea to know your test results and keep a list of the medicines you take. Where can you learn more? Go to http://mary lou-bailey.info/. Enter A655 in the search box to learn more about \"Learning About Medial Branch Block and Neurotomy. \" Current as of: October 14, 2016 Content Version: 11.3 © 0306-3298 [employer]. Care instructions adapted under license by [employer] (which disclaims liability or warranty for this information).  If you have questions about a medical condition or this instruction, always ask your healthcare professional. [place] [address] any warranty or liability for your use of this information. Introducing [facility]! [employer] introduces [employer] patient portal. Now you can access parts of your medical record, email your doctor's office, and request medication refills online. 1. In your internet browser, go to [email or web address] 2. Click on the First Time User? Click Here link in the Sign In box. You will see the New Member Sign Up page. 3. Enter your [employer] Access Code exactly as it appears below. You will not need to use this code after youve completed the sign-up process. If you do not sign up before the expiration date, you must request a new code. · [employer] Access Code: [email or web address] Expires: 11/29/2017 12:27 PM 
 
4. Enter the last four digits of your Social Security Number (xxxx) and Date of Birth (mm/dd/yyyy) as indicated and click Submit. You will be taken to the next sign-up page. 5. Create a [employer] ID. This will be your [employer] login ID and cannot be changed, so think of one that is secure and easy to remember. 6. Create a [employer] password. You can change your password at any time. 7. Enter your Password Reset Question and Answer. This can be used at a later time if you forget your password. 8. Enter your e-mail address. You will receive e-mail notification when new information is available in [address] 9. Click Sign Up. You can now view and download portions of your medical record. 10. Click the Download Summary menu link to download a portable copy of your medical information. If you have questions, please visit the Frequently Asked Questions section of the [employer] website. Remember, [employer] is NOT to be used for urgent needs. For medical emergencies, dial 911. Now available from your iPhone and Android! Please provide this summary of care documentation to your next provider. Your primary care clinician is listed as Shannon Stoddard. If you have any questions after today's visit, please call [phone].

## 2017-09-29 NOTE — PROGRESS NOTES
[facility] [address].  [address], [address]  [facility], [address]  Phone: [phone]  Fax: [phone]        Kulwinder Armenta  : 1929  PCP: Sedrick Kwok MD  2017    PROGRESS NOTE      ASSESSMENT AND PLAN    Alexey Gallardo comes in to the office today for a cervical MRI f/u. The study showed multilevel cervical facet arthropathy, worst at C4-5. This correlates well with his extension and rotation based pain. I referred him to Dr. Navneet Christopher for cervical medial branch block / RFA. Pt will f/u prn. Diagnoses and all orders for this visit:    1. Facet arthropathy, cervical  -     REFERRAL TO PAIN MANAGEMENT       Follow-up Disposition:  Return if symptoms worsen or fail to improve. HISTORY OF PRESENT ILLNESS  Alexey Gallardo is a 80 y.o. male. A&P / HPI from 2017:  Alexey Gallardo comes in to the office today c/o chronic neck pain with numbness radiating into the right upper extremity.      His symptoms are likely related to cervical and lumbar facet arthropathy. There could also be a component of cervical radiculopathy.      On the examination he had a positive right Spurling's sign with decreased cervical ROM. I referred him to get a cervical MRI. I prescribed him Mobic 15 mg daily for his arthritic pain. Updates from 17:  Pt presents for a cervical MRI f/u. He currently rates his pain at an aching 8/10. He discontinued the Gabapentin due to negative side affects. He started taking Tylenol for his arthritic type pains. He started working out at "[employer] which has provided significant relief. The study showed facet multilevel facet arthropathy and foraminal stenosis. PAST MEDICAL HISTORY   Past Medical History:   Diagnosis Date    Bladder cancer ([address]     Hypertension        Past Surgical History:   Procedure Laterality Date    HX CAROTID ENDARTERECTOMY      HX ROTATOR CUFF REPAIR Right    .       MEDICATIONS Current Outpatient Prescriptions   Medication Sig Dispense Refill    meloxicam (MOBIC) 15 mg tablet Take 1 Tab by mouth daily. 30 Tab 0    erythromycin (ILOTYCIN) ophthalmic ointment       fluticasone (FLONASE) 50 mcg/actuation nasal spray       KLOR-CON 10 10 mEq tablet       valsartan-hydrochlorothiazide (DIOVAN-HCT) 320-12.5 mg per tablet       gabapentin (NEURONTIN) 100 mg capsule       CRESTOR 20 mg tablet       MULTIVIT-MINERALS/FOLIC ACID (ADULT MULTIVITAMIN GUMMIES PO) Take  by mouth. ALLERGIES    Allergies   Allergen Reactions    Aspirin Other (comments)     Upset stomach            SOCIAL HISTORY    Social History     Social History    Marital status:      Spouse name: N/A    Number of children: N/A    Years of education: N/A     Social History Main Topics    Smoking status: Never Smoker    Smokeless tobacco: Never Used    Alcohol use No    Drug use: No    Sexual activity: No     Other Topics Concern    None     Social History Narrative       FAMILY HISTORY  History reviewed. No pertinent family history. REVIEW OF SYSTEMS  Review of Systems   Constitutional: Negative for chills, diaphoresis, fever, malaise/fatigue and weight loss. Respiratory: Negative for shortness of breath. Cardiovascular: Negative for chest pain and leg swelling. Gastrointestinal: Negative for constipation, nausea and vomiting. Neurological: Negative for dizziness, tingling, seizures, loss of consciousness, weakness and headaches. Psychiatric/Behavioral: The patient does not have insomnia.            PHYSICAL EXAMINATION  Visit Vitals    /53    Pulse 75    Temp 97.9 °F (36.6 °C) (Oral)    Resp 16    Ht 5' 6\" (1.676 m)    Wt 130 lb 9.6 oz (59.2 kg)    SpO2 98%    BMI 21.08 kg/m2       Pain Assessment  9/29/2017   Location of Pain -   Location Modifiers -   Severity of Pain 4   Quality of Pain (No Data)   Quality of Pain Comment stiffness   Frequency of Pain - Aggravating Factors Standing   Limiting Behavior -   Relieving Factors Exercises   Relieving Factors Comment swimming   Result of Injury -           Constitutional:  Well developed, well nourished, in no acute distress. Psychiatric: Affect and mood are appropriate. Integumentary: No rashes or abrasions noted on exposed areas. SPINE/MUSCULOSKELETAL EXAM    Cervical spine:  Neck is midline. Normal muscle tone. No focal atrophy is noted. Decreased ROM   Shoulder ROM intact. Mild tenderness to palpation. Positive right Spurling's sign. Negative Tinel's sign. Negative Phan's sign. Thoracic kyphosis   Cervical lordosis       Sensation in the bilateral arms grossly intact to light touch.      Lumbar spine:  No rash, ecchymosis, or gross obliquity. No fasciculations. No focal atrophy is noted. No pain with hip ROM. Full range of motion. Mild tenderness to palpation. No tenderness to palpation at the sciatic notch. SI joints non-tender. Trochanters non tender. Stiffness/pain with back extension       Sensation in the bilateral legs grossly intact to light touch. MOTOR:      Biceps  Triceps Deltoids Wrist Ext Wrist Flex Hand Intrin   Right 5/5 5/5 5/5 5/5 5/5 5/5   Left 5/5 5/5 5/5 5/5 5/5 5/5             Hip Flex  Quads Hamstrings Ankle DF EHL Ankle PF   Right 5/5 5/5 5/5 5/5 5/5 5/5   Left 5/5 5/5 5/5 5/5 5/5 5/5     DTRs are 2+ biceps, triceps, brachioradialis, patella, and Achilles.     Negative Straight Leg raise. Squat not tested. No difficulty with tandem gait. Trace ankle edema bilaterally      Ambulation without assistive device. FWB.       RADIOGRAPHS  Cervical MRI images taken on 09/15/2017 personally reviewed with patient:     FINDINGS:  Grade 1 anterior listhesis of C4 on C5, C5 on C6 and slight anterior listhesis  of C6 on C7. Moderate degenerative discogenic disease C5-C6 and C6-C7. 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 Mild endplate edema at L9-Q1.  Vertebral body heights are  maintained. .  The craniocervical junction is intact. The course, caliber, and  signal intensity of the spinal cord are normal.       C2/3:  Mild central disc protrusion. Mild facet arthropathy. No significant  central canal or foraminal stenosis.     C3/4:  Mild central disc protrusion. Mild/moderate facet arthropathy. Mild/moderate left foraminal stenosis. Mild narrowing of the central canal.     C4/5:  Mild disc osteophyte complex. Severe right and mild left facet  arthropathy. Moderate right and mild left foraminal stenosis. Central canal is  patent.     C5/6:  Disc osteophyte complex. Mild/moderate facet arthropathy. Mild/moderate  foraminal stenosis. Mild central canal stenosis.     C6/7:  Disc osteophyte complex. Mild facet arthropathy. Mild foraminal stenosis. Central canal is patent.      C7/T1:  No significant central canal or foraminal stenosis.     IMPRESSION  IMPRESSION:       Moderate multilevel degenerative changes, detailed at each level above. Central disc protrusion C2-C3, C3-C4. Facet arthropathy and foraminal stenosis at C4-C5, C5-C6. Significant degenerative discogenic disease at C6-C7. Cervical XR images taken on 09/14/2017 personally reviewed with patient:  Severe disc degeneration at C5-6 and C6-7  Facet sclerosis          reviewed     Mr. Taurus Heller has a reminder for a \"due or due soon\" health maintenance. I have asked that he contact his primary care provider for follow-up on this health maintenance.      This plan was reviewed with the patient and patient agrees. All questions were answered. More than half of this visit today was spent on counseling.     Written by Ariela Crawford, as dictated by Dr. Frank Chi, Dr. Stephanie Saleh, confirm that all documentation is accurate.

## 2017-10-02 ENCOUNTER — HOSPITAL ENCOUNTER (OUTPATIENT)
Dept: VASCULAR SURGERY | Age: 82
Discharge: HOME | End: 2017-10-02
Attending: FAMILY MEDICINE
Payer: MEDICARE

## 2017-10-02 DIAGNOSIS — I65.21 CAROTID STENOSIS, RIGHT: ICD-10-CM

## 2017-10-02 PROCEDURE — 93880 EXTRACRANIAL BILAT STUDY: CPT

## 2017-10-02 NOTE — PROCEDURES
[facility]  *** FINAL REPORT ***    Name: Desiree Schaumann  MRN: [id number]    Outpatient  : 1929  HIS Order #: [id number]  [address] [facility] Visit #: [id number]  Date: 02 Oct 2017    TYPE OF TEST: Cerebrovascular Duplex    REASON FOR TEST  Known carotid stenosis    Right Carotid:-             Proximal               Mid                 Distal  cm/s  Systolic  Diastolic  Systolic  Diastolic  Systolic  Diastolic  CCA:     75.1       8.0       85.0      10.0       72.0      10.0  Bulb:  ECA:    112.0  ICA:     38.0       6.0       51.0      10.0       66.0      10.0  ICA/CCA:  0.8       1.0    ICA Stenosis:    Right Vertebral:-  Finding: Antegrade  Sys:       37.0  Isaura:        6.0    Right Subclavian: Normal    Left Carotid:-            Proximal                Mid                 Distal  cm/s  Systolic  Diastolic  Systolic  Diastolic  Systolic  Diastolic  CCA:     46.8      12.0       75.0      11.0       74.0      11.0  Bulb:  ECA:     82.0  ICA:     73.0      17.0       68.0      16.0       56.0      13.0  ICA/CCA:  0.9       1.4    ICA Stenosis: <50%    Left Vertebral:-  Finding: Antegrade  Sys:       42.0  Isaura:        8.0    Left Subclavian: Normal    INTERPRETATION/FINDINGS  Duplex images were obtained using 2-D gray scale, color flow, and  spectral Doppler analysis. 1. Patent right carotid endarterectomy without evidence of a  hemodynamically significant stenosis. 2. <50% stenosis in the left internal carotid artery. 3. No significant stenosis in the external carotid arteries  bilaterally. 4. Antegrade flow in both vertebral arteries. 5. Normal flow in both subclavian arteries. Plaque Morphology:  1. Calcific plaque in the bulb and left ICA. ADDITIONAL COMMENTS  No previous exam available for comparison. I have personally reviewed the data relevant to the interpretation of  this  study. TECHNOLOGIST: Millicent Woodward.  Ollie RVCHERRY  Signed: 10/02/2017 11:28 AM    PHYSICIAN: Deepali Rodriguez MD  Signed: 10/02/2017 01:15 PM

## 2017-10-18 ENCOUNTER — OFFICE VISIT (OUTPATIENT)
Dept: PAIN MANAGEMENT | Age: 82
End: 2017-10-18

## 2017-10-18 VITALS
RESPIRATION RATE: 14 BRPM | TEMPERATURE: 95 F | BODY MASS INDEX: 20.98 KG/M2 | SYSTOLIC BLOOD PRESSURE: 170 MMHG | DIASTOLIC BLOOD PRESSURE: 74 MMHG | WEIGHT: 130 LBS | HEART RATE: 64 BPM

## 2017-10-18 DIAGNOSIS — M47.812 SPONDYLOSIS OF CERVICAL REGION WITHOUT MYELOPATHY OR RADICULOPATHY: Primary | ICD-10-CM

## 2017-10-18 DIAGNOSIS — G89.4 CHRONIC PAIN SYNDROME: ICD-10-CM

## 2017-10-18 DIAGNOSIS — M47.812 FACET ARTHROPATHY, CERVICAL: ICD-10-CM

## 2017-10-18 DIAGNOSIS — M50.30 DEGENERATIVE DISC DISEASE, CERVICAL: ICD-10-CM

## 2017-10-18 NOTE — PROGRESS NOTES
Nursing Notes    Patient presents to the office today in follow-up. Patient rates his pain at 4/10 on the numerical pain scale. Comments:  Patient os here today for a new patient appt today he states his pain level today is a 4  POC UDS was not performed in office today    Any new labs or imaging since last appointment? NO    Have you been to an emergency room (ER) or urgent care clinic since your last visit? NO            Have you been hospitalized since your last visit? NO     If yes, where, when, and reason for visit? Have you seen or consulted any other health care providers outside of the [address] [place] [address]  since your last visit? NO     If yes, where, when, and reason for visit? Mr. Kayode Tracey has a reminder for a \"due or due soon\" health maintenance. I have asked that he contact his primary care provider for follow-up on this health maintenance.

## 2017-10-20 PROBLEM — M47.812 SPONDYLOSIS OF CERVICAL REGION WITHOUT MYELOPATHY OR RADICULOPATHY: Status: ACTIVE | Noted: 2017-10-20

## 2017-10-20 PROBLEM — M50.30 DEGENERATIVE DISC DISEASE, CERVICAL: Status: ACTIVE | Noted: 2017-10-20

## 2017-10-20 PROBLEM — G89.4 CHRONIC PAIN SYNDROME: Status: ACTIVE | Noted: 2017-10-20

## 2017-10-20 PROBLEM — M47.812 FACET ARTHROPATHY, CERVICAL: Status: ACTIVE | Noted: 2017-10-20

## 2017-10-20 RX ORDER — DIAZEPAM 5 MG/1
10 TABLET ORAL ONCE
Status: CANCELLED | OUTPATIENT
Start: 2017-10-23 | End: 2017-10-23

## 2017-10-20 RX ORDER — SODIUM CHLORIDE 0.9 % (FLUSH) 0.9 %
5-10 SYRINGE (ML) INJECTION AS NEEDED
Status: CANCELLED | OUTPATIENT
Start: 2017-10-20

## 2017-10-20 NOTE — PROGRESS NOTES
[address] for Pain Management  Interventional Pain Management Consultation History & Physical    PATIENT NAME:  Jhonatan Grew OF BIRTH:   6/4/1929    DATE OF SERVICE:   10/18/2017      CHIEF COMPLAINT:  Shoulder Pain; Hip Pain; and Neck Pain      REASON FOR VISIT:   Reji Ryan presents to the pain clinic today for initial evaluation and to consider interventional pain management options as indicated for the type and location of the pain the patient is presenting with. HISTORY OF PRESENT ILLNESS: Patient presents for initial evaluation and consideration for interventional procedures as indicated. He is referred to us by Dr Meghan Ku for consideration for cervical radiofrequency neurotomy procedures as indicated. Patient endorses today chronic onset of neck pain. Right-sided much greater than left-sided. Pain radiates or refers to the right shoulder. He endorses aching throbbing pain of the right side of his neck much more than the left, and referring in similar fashion to the right shoulder. Pain is increased with twisting and turning his head especially to the right as well as extension and flexion. He is tried medications as well as home exercises and stretching with little benefit. He has not previously had interventional pain procedures. He has not had previously had cervical spine surgery. He does not take blood thinners. By review of available medical records, progress note dated September 29, 2017 by Dr Meghan Ku is reviewed. Patient has cervical facet arthropathy worst at C4-5. Chronic neck pain and right upper extremity symptoms. He may also have component of cervical radiculopathy. Positive Spurling sign on the right with decreased cervical range of motion especially right-sided.   History of bladder cancer and hypertension, carotid endarterectomy and rotator cuff repair in the past.      We reviewed cervical MRI dated September 15, 2017. This is significant for grade 1 anterolisthesis C4-5, C5-6, C6-7. Moderate degenerative disc changes C5-6 and C6-7. Endplate edema D1-5. Disc protrusions throughout much of the cervical spine. Facet arthropathy throughout much of the cervical spine. ASSESSMENT/OPTIONS: as follows. We discussed options. Patient has chronic right-sided greater than left-sided neck pain radiating to shoulders. He has rather constant pain right neck and right shoulder, and intermittent pain left neck and left shoulder. Pain is increased with cervical facet loading maneuvers. He appears to have signs and symptoms as well as exam and imaging evidence primarily suggestive of cervical facet syndrome due to cervical facet arthropathy and cervical facet hypertrophy. I will schedule him for series of cervical radiofrequency neurotomy procedures at C4-5, C5-6, C6-7 levels with IV conscious sedation. I have discussed the risks and benefits, indications, contraindications, and side effects of intended procedure with the patient. I have used skeleton spine model to describe and discuss the procedure with the patient. I have answered all questions relating to the procedure. Patient understands the nature of the procedure and wishes to proceed. Patient has no further questions. MRI Results (most recent):    Results from [place] encounter on 09/15/17   MRI CERV SPINE WO CONT   Narrative EXAM:  MRI CERV SPINE WO CONT    INDICATION:   cervical radiculopathy, decreased cervical ROM    COMPARISON: None. TECHNIQUE: MR imaging of the cervical spine was performed including sagittal T1,  T2, STIR;  axial GRE, T2, T1. Contrast was not administered. FINDINGS:  Grade 1 anterior listhesis of C4 on C5, C5 on C6 and slight anterior listhesis  of C6 on C7. Moderate degenerative discogenic disease C5-C6 and C6-C7. 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 Mild endplate edema at P4-B3.  Vertebral body heights are  maintained. .  The craniocervical junction is intact. The course, caliber, and  signal intensity of the spinal cord are normal.      C2/3:  Mild central disc protrusion. Mild facet arthropathy. No significant  central canal or foraminal stenosis. C3/4:  Mild central disc protrusion. Mild/moderate facet arthropathy. Mild/moderate left foraminal stenosis. Mild narrowing of the central canal.    C4/5:  Mild disc osteophyte complex. Severe right and mild left facet  arthropathy. Moderate right and mild left foraminal stenosis. Central canal is  patent. C5/6:  Disc osteophyte complex. Mild/moderate facet arthropathy. Mild/moderate  foraminal stenosis. Mild central canal stenosis. C6/7:  Disc osteophyte complex. Mild facet arthropathy. Mild foraminal stenosis. Central canal is patent. C7/T1:  No significant central canal or foraminal stenosis. Impression IMPRESSION:      Moderate multilevel degenerative changes, detailed at each level above. Central disc protrusion C2-C3, C3-C4. Facet arthropathy and foraminal stenosis at C4-C5, C5-C6. Significant degenerative discogenic disease at C6-C7. PAST MEDICAL HISTORY:   The patient  has a past medical history of Bladder cancer ([facility] [address] and Hypertension. PAST SURGICAL HISTORY:   The patient  has a past surgical history that includes rotator cuff repair (Right) and carotid endarterectomy. CURRENT MEDICATIONS:   The patient has a current medication list which includes the following prescription(s): meloxicam, erythromycin, fluticasone, klor-con 10, valsartan-hydrochlorothiazide, gabapentin, crestor, and multivit-minerals/folic acid. ALLERGIES:     Allergies   Allergen Reactions    Aspirin Other (comments)     Upset stomach         FAMILY HISTORY:   The patient family history is not on file. SOCIAL HISTORY:   The patient  reports that he has never smoked.  He has never used smokeless tobacco. The patient  reports that he does not drink alcohol. He also  reports that he does not use illicit drugs. REVIEW OF SYSTEMS:    The patient denies fever, chills, weight loss (Constitutional), rash, itching (Skin), tinnitus, congestion (HENT), blurred vision, photophobia (Eyes), palpitations, orthopnea (Cardiovascular), hemoptysis, wheezing (Respiratory), nausea, vomiting, diarrhea (Gastrointestinal), dysuria, hematuria, urgency (Genitourinary), bowel or bladder incontinence, loss of consciousness (Neurologic), suicidal or homicidal ideation or hallucinations (Psychiatric). Denies swelling, axillary or groin masses (Lymphatic). PHYSICAL EXAM:  VS:   Visit Vitals    /74    Pulse 64    Temp 95 °F (35 °C)    Resp 14    Wt 59 kg (130 lb)    BMI 20.98 kg/m2     General: Well-developed and well-nourished. Body habitus consistent with recorded height and weight and the calculated BMI. Apparent distress due to neck and shoulder pain. Head: Normocephalic, atraumatic. Skin: Inspection of the skin reveals no rashes, lesions or infection. CV: Regular rate. No murmurs or rubs noted. No peripheral edema noted. Pulm: Respirations are even and unlabored. Extr: No clubbing, cyanosis, or edema noted. Musculoskeletal:  1. Cervical spine decreased range of motion right greater than left . Paraspinous tenderness right greater than . There is no scoliosis, asymmetry, or musculoskeletal defect. 2. Thoracic spine  Full ROM. No paraspinous tenderness at any level. There is no scoliosis, asymmetry, or musculoskeletal defect. 3. Lumbar spine decreased ROM. No paraspinous tenderness at any level. SI joints are nontender bilaterally. There is no scoliosis, asymmetry, or musculoskeletal defect. 4. Right upper extremity  Full ROM. 5/5 muscle strength in all muscle groups. No pain or tenderness in shoulder, elbow, wrist, or hand. 5. Left upper extremity  Full ROM. 5/5 muscle strength in all muscle groups.   No pain or tenderness in shoulder, elbow, wrist, or hand. 6. Right lower extremity  Full ROM. 5/5 muscle strength in all muscle groups. No pain, tenderness, or swelling in the hip, knee, ankle or foot. 7. Left lower extremity  Full ROM. 5/5 muscle strength in all muscle groups. No pain, tenderness, or swelling in the hip, knee, ankle or foot. Neurological:  1. Mental Status - Alert, awake and oriented. Speech is clear and appropriate. 2. Cranial Nerves - Extraocular muscles intact bilaterally. Cranial nerves II-XII grossly intact bilaterally. 3. Gait - Non-antalgic   4. Reflexes - 2+ and symmetric throughout. 5. Sensation - Intact to light touch and pin prick. 6. Provocative Tests - Spurlings positive on the right . Psychological:  1. Mood and affect  Appropriate. 2. Speech  Appropriate. 3. Though content  Appropriate. 4. Judgment  Appropriate. ASSESSMENT:      ICD-10-CM ICD-9-CM    1. Spondylosis of cervical region without myelopathy or radiculopathy M47.812 721.0    2. Facet arthropathy, cervical M12.88 721.0    3. Degenerative disc disease, cervical M50.30 722.4    4. Chronic pain syndrome G89.4 338.4            PLAN:    1.    I have thoroughly discussed the risks and benefits, indications, contraindications, and side effects of any and procedures that were mentioned at today's patient visit. I have used a skeleton model to explain all procedures, as well as to provide added emphasis regarding procedures and as well for patient education purposes. I have answered all questions in great detail, and I have obtained verbal confirmation for all procedures planned with the patient. 3.    I have reviewed in great detail today the patient's MRI and other imaging studies with the patient. I have explained to the patient their condition using both actual recent and relevant images insofar as I am able to obtain actual images. I have used a skeleton model for added emphasis as well as patient education.       4. I have advised patient to have a primary care provider continue to care for their health maintenance and general medical conditions. 5,    I have placed appropriate referrals to specialty care providers as I have deemed necessary through today's clinical consultation with the patient. 5.    I have explained to the patient that if any significant side effects, issues, problems, concerns, or perceived complications may have arisen at around the time of the patient's procedures, they should either call the pain management clinic or go to the emergency room immediately for medical provider evaluation. 6.   I have encouraged all patients to call the pain management clinic with any questions or concerns that they may have pertaining to their procedures. DISPOSITION:   The patients condition and plan were discussed at length and all questions were answered. The patient agrees with the plan. A total of 40 minutes was spent with the patient of which over half of the time was spent counseling the patient. Francy Davis MD 10/20/2017 9:38 AM    Note: Although these clinic notes were documented by the provider at the time of the exam, they have not been proofed and are subject to transcription variance.

## 2017-10-23 ENCOUNTER — APPOINTMENT (OUTPATIENT)
Dept: GENERAL RADIOLOGY | Age: 82
End: 2017-10-23
Attending: PHYSICAL MEDICINE & REHABILITATION
Payer: MEDICARE

## 2017-10-23 ENCOUNTER — HOSPITAL ENCOUNTER (OUTPATIENT)
Age: 82
Setting detail: OUTPATIENT SURGERY
Discharge: HOME | End: 2017-10-23
Attending: PHYSICAL MEDICINE & REHABILITATION | Admitting: PHYSICAL MEDICINE & REHABILITATION
Payer: MEDICARE

## 2017-10-23 VITALS
HEIGHT: 66 IN | DIASTOLIC BLOOD PRESSURE: 85 MMHG | RESPIRATION RATE: 18 BRPM | SYSTOLIC BLOOD PRESSURE: 165 MMHG | BODY MASS INDEX: 20.89 KG/M2 | WEIGHT: 130 LBS | TEMPERATURE: 97.4 F | OXYGEN SATURATION: 99 % | HEART RATE: 69 BPM

## 2017-10-23 PROCEDURE — 74011250637 HC RX REV CODE- 250/637: Performed by: PHYSICAL MEDICINE & REHABILITATION

## 2017-10-23 PROCEDURE — 76010000009 HC PAIN MGT 0 TO 30 MIN PROC: Performed by: PHYSICAL MEDICINE & REHABILITATION

## 2017-10-23 PROCEDURE — 77030003672 HC NDL SPN HALY -A: Performed by: PHYSICAL MEDICINE & REHABILITATION

## 2017-10-23 PROCEDURE — 74011000250 HC RX REV CODE- 250

## 2017-10-23 PROCEDURE — 74011250636 HC RX REV CODE- 250/636

## 2017-10-23 PROCEDURE — 74011000250 HC RX REV CODE- 250: Performed by: PHYSICAL MEDICINE & REHABILITATION

## 2017-10-23 PROCEDURE — 74011250636 HC RX REV CODE- 250/636: Performed by: PHYSICAL MEDICINE & REHABILITATION

## 2017-10-23 RX ORDER — DIAZEPAM 5 MG/1
5 TABLET ORAL ONCE
Status: COMPLETED | OUTPATIENT
Start: 2017-10-23 | End: 2017-10-23

## 2017-10-23 RX ORDER — LIDOCAINE HYDROCHLORIDE 10 MG/ML
INJECTION, SOLUTION EPIDURAL; INFILTRATION; INTRACAUDAL; PERINEURAL AS NEEDED
Status: DISCONTINUED | OUTPATIENT
Start: 2017-10-23 | End: 2017-10-23 | Stop reason: HOSPADM

## 2017-10-23 RX ORDER — SODIUM CHLORIDE 0.9 % (FLUSH) 0.9 %
5-10 SYRINGE (ML) INJECTION AS NEEDED
Status: DISCONTINUED | OUTPATIENT
Start: 2017-10-23 | End: 2017-10-23 | Stop reason: HOSPADM

## 2017-10-23 RX ORDER — DIAZEPAM 5 MG/1
10 TABLET ORAL ONCE
Status: DISCONTINUED | OUTPATIENT
Start: 2017-10-23 | End: 2017-10-23

## 2017-10-23 RX ORDER — ROPIVACAINE HYDROCHLORIDE 2 MG/ML
INJECTION, SOLUTION EPIDURAL; INFILTRATION; PERINEURAL AS NEEDED
Status: DISCONTINUED | OUTPATIENT
Start: 2017-10-23 | End: 2017-10-23 | Stop reason: HOSPADM

## 2017-10-23 RX ADMIN — DIAZEPAM 5 MG: 5 TABLET ORAL at 08:09

## 2017-10-23 NOTE — INTERVAL H&P NOTE
H&P Update:  Kelsey Peralta was seen and examined. History and physical has been reviewed. The patient has been examined.  There have been no significant clinical changes since the completion of the originally dated History and Physical.    Signed By: Shakeel Moe MD     October 23, 2017 8:03 AM

## 2017-10-23 NOTE — PROCEDURES
[facility]    DIAGNOSTIC CERVICAL FACET INJECTION  PROCEDURE REPORT      PATIENT:  Marianne Martini  YOB: 1929  DATE OF SERVICE:  10/23/2017  [facility]    PRE-PROCEDURE DIAGNOSIS:  See Above    POST-PROCEDURE DIAGNOSIS:  See Above                PROCEDURE:  1. Bilateral diagnostic cervical medial branch blocks at C4/C5, C5/C6, C6/C7,  nerves  (07915, 50;  46534, 50;  20539, 50  )  2. Fluoroscopic needle guidance (17960)      LEVELS TREATED:   Bilateral  C4/C5, C5/C6, C6/C7,  nerves    ANESTHESIA:   Local with oral sedation. See Medication Administration Record for specific medications and dosage. COMPLICATIONS: None. PHYSICIAN:  Shivam Nguyen MD    PRE-PROCEDURE NOTE:  Pre-procedural assessment of the patient was performed including a limited history and physical examination. The details of the procedure were discussed with the patient, including the risks, benefits and alternative options and an informed consent was obtained. The patients NPO status, if necessary for the specific procedure and/or administration of moderate intravenous sedation, if utilized, and availability of a responsible adult to escort the patient following the procedure were confirmed. PROCEDURE NOTE:  The patient was brought to the procedure suite and positioned on the fluoroscopy table in the prone position. Physiologic monitors were applied and supplemental oxygen was administered via nasal cannula. The skin was prepped in the standard surgical fashion and sterile drapes were applied over the procedure site. Please refer to the Flowsheet for documentation of the patients vital signs and the Medication Administration Report for any oral and/or intravenous sedation administered prior to or during the procedure. 1% Lidocaine was utilized for local anesthesia.  Under AP fluoroscopic guidance a 25-gauge, 2-1/2 inch short bevel spinal needle was advanced from the posterior approach to the apex of the waist of the articular pillar at each of the above-listed levels. Each needle tip was rotated laterally and advanced slightly anteriorly 1-2 mm to lie alongside the corresponding medial branch nerve. After all needles were placed, 0.5 mL of ropivacaine 0.20% was injected at each location after the negative aspiration of blood, air or CSF. The needles were removed and the stilets were replaced. The procedure was performed on the contralateral side in the same fashion and at the same levels using the same volume of local anesthetic following negative aspiration of blood, air or CSF. The needles were removed intact. The area was thoroughly cleaned and sterile bandages applied as necessary. The patient tolerated the procedure well and vital signs remained stable throughout the procedure. The patient was assessed immediately following the procedure and was noted to have greater than 50% reduction in pain (a reduction from 7/10 to 2/10 in severity of cervical neck pain). Based on these results, the patient was considered an appropriate candidate for radiofrequency ablation of the above-mentioned medial branch nerves and will be scheduled for that procedure. The total levels successfully blocked were 3 levels,  both sides      POST-PROCEDURE COURSE:   The patient was escorted from the procedure suite in satisfactory condition and recovered per facility protocol based on the type of procedure performed and/or the sedation utilized. The patient did not experience any adverse events and remained hemodynamically stable during the post-procedure period. DISCHARGE NOTE:  Upon discharge, the patient was able to tolerate fluids and was in no acute distress. The patient was oriented to person, place and time and vital signs were stable.  Appropriate post-procedure instructions were provided and explained to the patient in detail and all questions were answered.     Juan Miller MD 10/23/2017 10:55 AM

## 2017-10-23 NOTE — H&P (VIEW-ONLY)
Nursing Notes    Patient presents to the office today in follow-up. Patient rates his pain at 4/10 on the numerical pain scale. Comments:  Patient os here today for a new patient appt today he states his pain level today is a 4  POC UDS was not performed in office today    Any new labs or imaging since last appointment? NO    Have you been to an emergency room (ER) or urgent care clinic since your last visit? NO            Have you been hospitalized since your last visit? NO     If yes, where, when, and reason for visit? Have you seen or consulted any other health care providers outside of the [address] [place] [address]  since your last visit? NO     If yes, where, when, and reason for visit? Mr. Chely Verma has a reminder for a \"due or due soon\" health maintenance. I have asked that he contact his primary care provider for follow-up on this health maintenance.

## 2017-10-23 NOTE — DISCHARGE INSTRUCTIONS
[address] [place] [address] for Pain Management      Post Procedures Instructions    *Resume Diet and Activity as tolerated. Rest for the remainder of the day. *You may fell worse before you feel better as the numbing medications wear off before the steroids take effect if used for your procedures. *Do not use affected extremity until numbness or loss of sensation has completely resolved without assistance. *DO NOT DRIVE, operate machinery/heavey equipment for 24 hours. *DO NOT DRINK ALCOHOL for 24 hours as it may interact with the sedation if you received it and also thins your blood and may cause you to bleed. *WAIT 24 hours before starting back ANY Blood thinning medications:   (Heparin, Coumadin, Warfarin, Lovenox, Plavix, Aggrenox)    *Resume Pre-Procedure Medications as prescribed except Blood Thinners unless directed by your Physician or Cardiologist.     *Avoid Hot tubs and Heating pad for 24 hours to prevent dissipation of medications, you may shower to remove bandages and remaining prep residue on the skin. * If you develop a Headache, drink plenty of fluids including beverages with caffeine (Coffee, Mt. Dew etc.) and rest.  If the headache persists longer than 24 hoursor intensifies - Please call Center [facility] ([facility]) [phone]      * If you are DIABETIC, check your blood sugar three times a day for the next three days, the steroids will increase your blood sugar. If your blood sugar is greater than 400 have someone drive you to the nearest [address]. * If you experience any of the following problems, call the Center for Pain Management [phone] between 8:00 am - 4:30pm or After Hours [phone].     Shortness of breath    Fever of 101 F or higher    Nausea / Vomiting (not normal to you)    Increasing stiffness in the neck    Weakness or numbness in the arms or legs that is not resolving    Prolonged and increasing pain > than 4 days    ANYTHING OUT of the ORDINARY TO YOU    If YOU are experiencing a severe reaction / complication that you have never had before post procedure, call 911 or go to the nearest emergency room! All patients must have a  for transportation [place] regardless if you do or do not receive sedation. DISCHARGE SUMMARY from Nurse      PATIENT INSTRUCTIONS:    After Oral  or intravenous sedation, for 24 hours or while taking prescription Narcotics:  · Limit your activities  · Do not drive and operate hazardous machinery  · Do not make important personal or business decisions  · Do  not drink alcoholic beverages  · If you have not urinated within 8 hours after discharge, please contact your surgeon on call. Report the following to your surgeon:  · Excessive pain, swelling, redness or odor of or around the surgical area  · Temperature over 101  · Nausea and vomiting lasting longer than 4 hours or if unable to take medications  · Any signs of decreased circulation or nerve impairment to extremity: change in color, persistent  numbness, tingling, coldness or increase pain  · Any questions        What to do at Home:  Recommended activity: Activity as tolerated, NO DRIVING FOR 24 Hours post injection          *  Please give a list of your current medications to your Primary Care Provider. *  Please update this list whenever your medications are discontinued, doses are      changed, or new medications (including over-the-counter products) are added. *  Please carry medication information at all times in case of emergency situations. These are general instructions for a healthy lifestyle:    No smoking/ No tobacco products/ Avoid exposure to second hand smoke    Surgeon General's Warning:  Quitting smoking now greatly reduces serious risk to your health.     Obesity, smoking, and sedentary lifestyle greatly increases your risk for illness    A healthy diet, regular physical exercise & weight monitoring are important for maintaining a healthy lifestyle    You may be retaining fluid if you have a history of heart failure or if you experience any of the following symptoms:  Weight gain of 3 pounds or more overnight or 5 pounds in a week, increased swelling in our hands or feet or shortness of breath while lying flat in bed. Please call your doctor as soon as you notice any of these symptoms; do not wait until your next office visit. Recognize signs and symptoms of STROKE:    F-face looks uneven    A-arms unable to move or move unevenly    S-speech slurred or non-existent    T-time-call 911 as soon as signs and symptoms begin-DO NOT go       Back to bed or wait to see if you get better-TIME IS BRAIN. [employer] Activation    Thank you for requesting access to [employer]. Please follow the instructions below to securely access and download your online medical record. [employer] allows you to send messages to your doctor, view your test results, renew your prescriptions, schedule appointments, and more. How Do I Sign Up? 1. In your internet browser, go to [email or web address]  2. Click on the First Time User? Click Here link in the Sign In box. You will be redirect to the New Member Sign Up page. 3. Enter your [employer] Access Code exactly as it appears below. You will not need to use this code after youve completed the sign-up process. If you do not sign up before the expiration date, you must request a new code. [employer] Access Code: [email or web address]  Expires: 2017 12:27 PM (This is the date your [employer] access code will )    4. Enter the last four digits of your Social Security Number (xxxx) and Date of Birth (mm/dd/yyyy) as indicated and click Submit. You will be taken to the next sign-up page. 5. Create a [employer] ID. This will be your [employer] login ID and cannot be changed, so think of one that is secure and easy to remember. 6. Create a [employer] password.  You can change your password at any time. 7. Enter your Password Reset Question and Answer. This can be used at a later time if you forget your password. 8. Enter your e-mail address. You will receive e-mail notification when new information is available in [address] 9. Click Sign Up. You can now view and download portions of your medical record. 10. Click the Download Summary menu link to download a portable copy of your medical information. Additional Information    If you have questions, please visit the Frequently Asked Questions section of the [email or web address] website at [email or web address] Remember, [employer] is NOT to be used for urgent needs. For medical emergencies, dial 911.

## 2017-10-24 ENCOUNTER — TELEPHONE (OUTPATIENT)
Dept: PAIN MANAGEMENT | Age: 82
End: 2017-10-24

## 2017-10-24 NOTE — TELEPHONE ENCOUNTER
Mr. Mario Daugherty was contacted for follow-up status post Bilateral diagnostic cervical medial branch blocks at C4/C5, C5/C6, C6/C7,  nerves   on October 23, 2017.  He reports:    Pre-procedure numerical pain score: 3/10  Post-procedure numerical pain score one week after: 5/10  Duration of relief post-procedure (if applicable): 6 hrs  Improvement in functional activities (if applicable): Yes  Percentage of overall improvement: 10%    COMMENTS:

## 2017-10-25 ENCOUNTER — OFFICE VISIT (OUTPATIENT)
Dept: PAIN MANAGEMENT | Age: 82
End: 2017-10-25

## 2017-10-25 VITALS
DIASTOLIC BLOOD PRESSURE: 75 MMHG | WEIGHT: 130 LBS | HEIGHT: 66 IN | SYSTOLIC BLOOD PRESSURE: 135 MMHG | HEART RATE: 68 BPM | RESPIRATION RATE: 18 BRPM | TEMPERATURE: 97.7 F | BODY MASS INDEX: 20.89 KG/M2

## 2017-10-25 DIAGNOSIS — G89.4 CHRONIC PAIN SYNDROME: ICD-10-CM

## 2017-10-25 DIAGNOSIS — M25.551 PAIN OF RIGHT HIP JOINT: Primary | ICD-10-CM

## 2017-10-25 DIAGNOSIS — M50.30 DEGENERATIVE DISC DISEASE, CERVICAL: ICD-10-CM

## 2017-10-25 DIAGNOSIS — M47.812 SPONDYLOSIS OF CERVICAL REGION WITHOUT MYELOPATHY OR RADICULOPATHY: ICD-10-CM

## 2017-10-25 DIAGNOSIS — M47.812 FACET ARTHROPATHY, CERVICAL: ICD-10-CM

## 2017-10-25 NOTE — PROGRESS NOTES
[address] [facility]  Interventional Pain Management Consultation History & Physical    PATIENT NAME:  Veronica Jovel     YOB: 1929    DATE OF SERVICE:   10/25/2017      CHIEF COMPLAINT:  Back Pain      REASON FOR VISIT:   Veronica Jovel presents to the pain clinic today for follow on evaluation and to consider interventional pain management options as indicated for the type and location of the pain the patient is presenting with. HISTORY OF PRESENT ILLNESS:   Patient returns for follow on evaluation after his first set of diagnostic cervical medial branch blocks at C4-5, C5-6, C6-7 levels. He states that these only offered approximately 10% pain relief the first day, but much more relief the day after that. Unfortunately, this is inconsistent with the results we would have expected. I therefore do not believe his neck pain is primarily cervical facet mediated. He has also previously had cervical epidural steroid injections in the distant past, ×2. These also have not helped his neck pain. At this point, I do not have other reasonable options interventional pain relief for him. He also complains today of a new pain, his right hip. He has aching throbbing pain of his right hip. Pain is increased with ambulation weightbearing on his right hip. Medications are minimally helping his right hip pain. He would like this pain addressed if possible. ASSESSMENT/OPTIONS: as follows. We discussed options. First, with regard to his ongoing neck pain, this does not appear to be cervical facet related. At this point, I recommend he try H wave therapy. I will put in a consult for this. Second, with regard to his right hip pain, I placed a request for right hip x-ray. He can get this done in the next several days. He would like to come in to be reevaluated and to review his right hip x-ray at his next visit.   We will discuss interventional options then. MRI Results (most recent):    Results from [place] encounter on 09/15/17   MRI CERV SPINE WO CONT   Narrative EXAM:  MRI CERV SPINE WO CONT    INDICATION:   cervical radiculopathy, decreased cervical ROM    COMPARISON: None. TECHNIQUE: MR imaging of the cervical spine was performed including sagittal T1,  T2, STIR;  axial GRE, T2, T1. Contrast was not administered. FINDINGS:  Grade 1 anterior listhesis of C4 on C5, C5 on C6 and slight anterior listhesis  of C6 on C7. Moderate degenerative discogenic disease C5-C6 and C6-C7. 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 Mild endplate edema at M5-O2. Vertebral body heights are  maintained. .  The craniocervical junction is intact. The course, caliber, and  signal intensity of the spinal cord are normal.      C2/3:  Mild central disc protrusion. Mild facet arthropathy. No significant  central canal or foraminal stenosis. C3/4:  Mild central disc protrusion. Mild/moderate facet arthropathy. Mild/moderate left foraminal stenosis. Mild narrowing of the central canal.    C4/5:  Mild disc osteophyte complex. Severe right and mild left facet  arthropathy. Moderate right and mild left foraminal stenosis. Central canal is  patent. C5/6:  Disc osteophyte complex. Mild/moderate facet arthropathy. Mild/moderate  foraminal stenosis. Mild central canal stenosis. C6/7:  Disc osteophyte complex. Mild facet arthropathy. Mild foraminal stenosis. Central canal is patent. C7/T1:  No significant central canal or foraminal stenosis. Impression IMPRESSION:      Moderate multilevel degenerative changes, detailed at each level above. Central disc protrusion C2-C3, C3-C4. Facet arthropathy and foraminal stenosis at C4-C5, C5-C6. Significant degenerative discogenic disease at C6-C7. PAST MEDICAL HISTORY:   The patient  has a past medical history of Bladder cancer ([facility] [address] and Hypertension.     PAST SURGICAL HISTORY:   The patient has a past surgical history that includes rotator cuff repair (Right) and carotid endarterectomy. CURRENT MEDICATIONS:   The patient has a current medication list which includes the following prescription(s): meloxicam, erythromycin, fluticasone, klor-con 10, valsartan-hydrochlorothiazide, gabapentin, crestor, and multivit-minerals/folic acid. ALLERGIES:     Allergies   Allergen Reactions    Aspirin Other (comments)     Upset stomach         FAMILY HISTORY:   The patient family history is not on file. SOCIAL HISTORY:   The patient  reports that he has never smoked. He has never used smokeless tobacco. The patient  reports that he does not drink alcohol. He also  reports that he does not use illicit drugs. REVIEW OF SYSTEMS:    The patient denies fever, chills, weight loss (Constitutional), rash, itching (Skin), tinnitus, congestion (HENT), blurred vision, photophobia (Eyes), palpitations, orthopnea (Cardiovascular), hemoptysis, wheezing (Respiratory), nausea, vomiting, diarrhea (Gastrointestinal), dysuria, hematuria, urgency (Genitourinary), bowel or bladder incontinence, loss of consciousness (Neurologic), suicidal or homicidal ideation or hallucinations (Psychiatric). Denies swelling, axillary or groin masses (Lymphatic). PHYSICAL EXAM:  VS:   Visit Vitals    /75 (BP 1 Location: Left arm, BP Patient Position: Sitting)    Pulse 68    Temp 97.7 °F (36.5 °C) (Oral)    Resp 18    Ht 5' 6\" (1.676 m)    Wt 59 kg (130 lb)    BMI 20.98 kg/m2     General: Elderly male . Body habitus consistent with recorded height and weight and the calculated BMI. Apparent distress due to neck and right hip pain. Head: Normocephalic, atraumatic. Skin: Inspection of the skin reveals no rashes, lesions or infection. CV: Regular rate. No murmurs or rubs noted. No peripheral edema noted. Pulm: Respirations are even and unlabored.   Extr: No clubbing, cyanosis, or edema noted.  Musculoskeletal:  1. Cervical spine -decreased ROM. Paraspinous tenderness bilaterally . There is no scoliosis, asymmetry, or musculoskeletal defect. 2. Thoracic spine -decreased ROM. No paraspinous tenderness at any level. There is no scoliosis, asymmetry, or musculoskeletal defect. 3. Lumbar spine -markedly decreased ROM. Paraspinous tenderness. SI joints are nontender bilaterally. There is no scoliosis, asymmetry, or musculoskeletal defect. 4. Right upper extremity - Full ROM. 5/5 muscle strength in all muscle groups. No pain or tenderness in shoulder, elbow, wrist, or hand. 5. Left upper extremity - Full ROM. 5/5 muscle strength in all muscle groups. No pain or tenderness in shoulder, elbow, wrist, or hand. 6. Right lower extremity - Full ROM. 5/5 muscle strength in all muscle groups. No pain, tenderness, or swelling in the knee, ankle or foot. Right hip diffusely tender to palpation. Antalgic gait is noted. 7. Left lower extremity - Full ROM. 5/5 muscle strength in all muscle groups. No pain, tenderness, or swelling in the hip, knee, ankle or foot. Neurological:  1. Mental Status - Alert, awake and oriented. Speech is clear and appropriate. 2. Cranial Nerves - Extraocular muscles intact bilaterally. Cranial nerves II-XII grossly intact bilaterally. 3. Gait - antalgic   4. Reflexes - 2+ and symmetric throughout. 5. Sensation - Intact to light touch and pin prick. 6. Provocative Tests - Spurlings negative bilaterally. Psychological:  1. Mood and affect - Appropriate. 2. Speech - Appropriate. 3. Though content - Appropriate. 4. Judgment - Appropriate. ASSESSMENT:      ICD-10-CM ICD-9-CM    1. Pain of right hip joint M25.551 719.45 XR HIP RT W OR WO PELV 2-3 VWS           PLAN:    1.    I have thoroughly discussed the risks and benefits, indications, contraindications, and side effects of any and procedures that were mentioned at today's patient visit.  I have used a skeleton model to explain all procedures, as well as to provide added emphasis regarding procedures and as well for patient education purposes. I have answered all questions in great detail, and I have obtained verbal confirmation for all procedures planned with the patient. 3.    I have reviewed in great detail today the patient's MRI and other imaging studies with the patient. I have explained to the patient their condition using both actual recent and relevant images insofar as I am able to obtain actual images. I have used a skeleton model for added emphasis as well as patient education. 4.    I have advised patient to have a primary care provider continue to care for their health maintenance and general medical conditions. 5,    I have placed appropriate referrals to specialty care providers as I have deemed necessary through today's clinical consultation with the patient. 5.    I have explained to the patient that if any significant side effects, issues, problems, concerns, or perceived complications may have arisen at around the time of the patient's procedures, they should either call the pain management clinic or go to the emergency room immediately for medical provider evaluation. 6.   I have encouraged all patients to call the pain management clinic with any questions or concerns that they may have pertaining to their procedures. DISPOSITION:   The patients condition and plan were discussed at length and all questions were answered. The patient agrees with the plan. A total of 15 minutes was spent with the patient of which over half of the time was spent counseling the patient. Bethany Allison MD 10/25/2017 5:22 PM    Note: Although these clinic notes were documented by the provider at the time of the exam, they have not been proofed and are subject to transcription variance.

## 2017-10-26 ENCOUNTER — HOSPITAL ENCOUNTER (OUTPATIENT)
Dept: GENERAL RADIOLOGY | Age: 82
Discharge: HOME | End: 2017-10-26
Payer: MEDICARE

## 2017-10-26 DIAGNOSIS — M25.551 PAIN OF RIGHT HIP JOINT: ICD-10-CM

## 2017-10-26 PROCEDURE — 73502 X-RAY EXAM HIP UNI 2-3 VIEWS: CPT

## 2017-11-07 ENCOUNTER — OFFICE VISIT (OUTPATIENT)
Dept: PAIN MANAGEMENT | Age: 82
End: 2017-11-07

## 2017-11-07 VITALS
BODY MASS INDEX: 21.21 KG/M2 | DIASTOLIC BLOOD PRESSURE: 73 MMHG | TEMPERATURE: 96.8 F | SYSTOLIC BLOOD PRESSURE: 137 MMHG | WEIGHT: 132 LBS | RESPIRATION RATE: 14 BRPM | HEIGHT: 66 IN | HEART RATE: 66 BPM

## 2017-11-07 DIAGNOSIS — M25.551 PAIN OF RIGHT HIP JOINT: ICD-10-CM

## 2017-11-07 DIAGNOSIS — G89.4 CHRONIC PAIN SYNDROME: ICD-10-CM

## 2017-11-07 DIAGNOSIS — M50.30 DEGENERATIVE DISC DISEASE, CERVICAL: ICD-10-CM

## 2017-11-07 DIAGNOSIS — M47.812 FACET ARTHROPATHY, CERVICAL: ICD-10-CM

## 2017-11-07 DIAGNOSIS — M16.10 PRIMARY OSTEOARTHRITIS OF HIP, UNSPECIFIED LATERALITY: Primary | ICD-10-CM

## 2017-11-07 DIAGNOSIS — M47.812 SPONDYLOSIS OF CERVICAL REGION WITHOUT MYELOPATHY OR RADICULOPATHY: ICD-10-CM

## 2017-11-08 PROBLEM — M16.10 PRIMARY OSTEOARTHRITIS OF HIP: Status: ACTIVE | Noted: 2017-11-08

## 2017-11-08 NOTE — PROGRESS NOTES
[facility]  Interventional Pain Management Consultation History & Physical    PATIENT NAME:  Ivan Brunson OF BIRTH:   6/4/1929    DATE OF SERVICE:   11/7/2017      CHIEF COMPLAINT:  Hip Pain (right)      REASON FOR VISIT:   Tianna Naidu presents to the pain clinic today for follow on evaluation and to consider interventional pain management options as indicated for the type and location of the pain the patient is presenting with. HISTORY OF PRESENT ILLNESS:   Patient presents for follow on evaluation and to review his imaging of his right hip. He continues to have intermittent, waxing and waning, at times rather continuous neck pain with radiating shoulder symptoms. Pain continues to be exacerbated with cervical facet loading maneuvers. We had previously discussed and started procedures relating to cervical facet syndrome. However, he did not receive substantial benefit from his initial set of diagnostic cervical medial branch blocks. I have recommended that he continue to manage his pain conservatively of his neck, with H wave therapy as well as home exercises and physical therapy as indicated. He also has right hip pain, exacerbated with right hip loading, prolonged standing or walking. I had obtained imaging of his right hip, plain imaging. This shows no structural deformity, no acute fracture or dislocation. His bones are osteoporotic. ASSESSMENT/OPTIONS: as follows. We discussed options. With regard to his ongoing right hip pain, I still believe he has osteoarthritis of the right hip. This is not clearly elucidated on plain imaging, although my feeling is that he would demonstrate osteoarthritis on more advanced imaging such as right hip MRI, possibly right hip arthrogram.  I will set this gentleman up for intra-articular right hip injections to see how this helps his right hip pain.   If he does not improve, I may order these more advanced imaging studies. It is also possible that he may have a low-grade labral tear. I have discussed the risks and benefits, indications, contraindications, and side effects of intended procedure with the patient. I have used skeleton spine model to describe and discuss the procedure with the patient. I have answered all questions relating to the procedure. Patient understands the nature of the procedure and wishes to proceed. Patient has no further questions. MRI Results (most recent):    Results from [place] encounter on 09/15/17   MRI CERV SPINE WO CONT   Narrative EXAM:  MRI CERV SPINE WO CONT    INDICATION:   cervical radiculopathy, decreased cervical ROM    COMPARISON: None. TECHNIQUE: MR imaging of the cervical spine was performed including sagittal T1,  T2, STIR;  axial GRE, T2, T1. Contrast was not administered. FINDINGS:  Grade 1 anterior listhesis of C4 on C5, C5 on C6 and slight anterior listhesis  of C6 on C7. Moderate degenerative discogenic disease C5-C6 and C6-C7. 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 Mild endplate edema at M9-E3. Vertebral body heights are  maintained. .  The craniocervical junction is intact. The course, caliber, and  signal intensity of the spinal cord are normal.      C2/3:  Mild central disc protrusion. Mild facet arthropathy. No significant  central canal or foraminal stenosis. C3/4:  Mild central disc protrusion. Mild/moderate facet arthropathy. Mild/moderate left foraminal stenosis. Mild narrowing of the central canal.    C4/5:  Mild disc osteophyte complex. Severe right and mild left facet  arthropathy. Moderate right and mild left foraminal stenosis. Central canal is  patent. C5/6:  Disc osteophyte complex. Mild/moderate facet arthropathy. Mild/moderate  foraminal stenosis. Mild central canal stenosis. C6/7:  Disc osteophyte complex. Mild facet arthropathy. Mild foraminal stenosis. Central canal is patent.     C7/T1:  No significant central canal or foraminal stenosis. Impression IMPRESSION:      Moderate multilevel degenerative changes, detailed at each level above. Central disc protrusion C2-C3, C3-C4. Facet arthropathy and foraminal stenosis at C4-C5, C5-C6. Significant degenerative discogenic disease at C6-C7. PAST MEDICAL HISTORY:   The patient  has a past medical history of Bladder cancer ([address] and Hypertension. PAST SURGICAL HISTORY:   The patient  has a past surgical history that includes rotator cuff repair (Right) and carotid endarterectomy. CURRENT MEDICATIONS:   The patient has a current medication list which includes the following prescription(s): meloxicam, erythromycin, fluticasone, klor-con 10, valsartan-hydrochlorothiazide, gabapentin, crestor, and multivit-minerals/folic acid. ALLERGIES:     Allergies   Allergen Reactions    Aspirin Other (comments)     Upset stomach         FAMILY HISTORY:   The patient family history is not on file. SOCIAL HISTORY:   The patient  reports that he has never smoked. He has never used smokeless tobacco. The patient  reports that he does not drink alcohol. He also  reports that he does not use illicit drugs. REVIEW OF SYSTEMS:    The patient denies fever, chills, weight loss (Constitutional), rash, itching (Skin), tinnitus, congestion (HENT), blurred vision, photophobia (Eyes), palpitations, orthopnea (Cardiovascular), hemoptysis, wheezing (Respiratory), nausea, vomiting, diarrhea (Gastrointestinal), dysuria, hematuria, urgency (Genitourinary), bowel or bladder incontinence, loss of consciousness (Neurologic), suicidal or homicidal ideation or hallucinations (Psychiatric). Denies swelling, axillary or groin masses (Lymphatic).            PHYSICAL EXAM:  VS:   Visit Vitals    /73    Pulse 66    Temp 96.8 °F (36 °C)    Resp 14    Ht 5' 6\" (1.676 m)    Wt 59.9 kg (132 lb)    BMI 21.31 kg/m2     General: Elderly male , body habitus consistent with recorded height and weight and the calculated BMI. Apparent distress due to intermittent neck and right hip pain. Head: Normocephalic, atraumatic. Skin: Inspection of the skin reveals no rashes, lesions or infection. CV: Regular rate. No murmurs or rubs noted. No peripheral edema noted. Pulm: Respirations are even and unlabored. Extr: No clubbing, cyanosis, or edema noted. Musculoskeletal:  1. Cervical spine -decreased ROM. Paraspinous tenderness bilaterally . There is no scoliosis, asymmetry, or musculoskeletal defect. 2. Thoracic spine - Full ROM. No paraspinous tenderness at any level. There is no scoliosis, asymmetry, or musculoskeletal defect. 3. Lumbar spine -decreased range of motion all axes . paraspinous tenderness. SI joints are nontender bilaterally. There is no scoliosis, asymmetry, or musculoskeletal defect. 4. Right upper extremity - Full ROM. 5/5 muscle strength in all muscle groups. No pain or tenderness in shoulder, elbow, wrist, or hand. 5. Left upper extremity - Full ROM. 5/5 muscle strength in all muscle groups. No pain or tenderness in shoulder, elbow, wrist, or hand. 6. Right lower extremity - Full ROM. 5/5 muscle strength in all muscle groups. No pain, tenderness, or swelling in the knee, ankle or foot. Right hip tender to palpation with moderate applied pressure. Antalgic gait favoring left leg is noted. Decreased range of motion of with walking is noted, especially the right hip. 7. Left lower extremity - Full ROM. 5/5 muscle strength in all muscle groups. No pain, tenderness, or swelling in the hip, knee, ankle or foot. Neurological:  1. Mental Status - Alert, awake and oriented. Speech is clear and appropriate. 2. Cranial Nerves - Extraocular muscles intact bilaterally. Cranial nerves II-XII grossly intact bilaterally. 3. Gait - antalgic   4. Reflexes - 2+ and symmetric throughout. 5. Sensation - Intact to light touch and pin prick. 6. Provocative Tests - Straight leg raise negative bilaterally. Psychological:  1. Mood and affect - Appropriate. 2. Speech - Appropriate. 3. Though content - Appropriate. 4. Judgment - Appropriate. ASSESSMENT:      ICD-10-CM ICD-9-CM    1. Primary osteoarthritis of hip, unspecified laterality M16.10 715.15    2. Pain of right hip joint M25.551 719.45    3. Spondylosis of cervical region without myelopathy or radiculopathy M47.812 721.0    4. Facet arthropathy, cervical M12.88 721.0    5. Degenerative disc disease, cervical M50.30 722.4    6. Chronic pain syndrome G89.4 338.4            PLAN:    1.    I have thoroughly discussed the risks and benefits, indications, contraindications, and side effects of any and procedures that were mentioned at today's patient visit. I have used a skeleton model to explain all procedures, as well as to provide added emphasis regarding procedures and as well for patient education purposes. I have answered all questions in great detail, and I have obtained verbal confirmation for all procedures planned with the patient. 3.    I have reviewed in great detail today the patient's MRI and other imaging studies with the patient. I have explained to the patient their condition using both actual recent and relevant images insofar as I am able to obtain actual images. I have used a skeleton model for added emphasis as well as patient education. 4.    I have advised patient to have a primary care provider continue to care for their health maintenance and general medical conditions. 5,    I have placed appropriate referrals to specialty care providers as I have deemed necessary through today's clinical consultation with the patient.   5.    I have explained to the patient that if any significant side effects, issues, problems, concerns, or perceived complications may have arisen at around the time of the patient's procedures, they should either call the pain management clinic or go to the emergency room immediately for medical provider evaluation. 6.   I have encouraged all patients to call the pain management clinic with any questions or concerns that they may have pertaining to their procedures. DISPOSITION:   The patients condition and plan were discussed at length and all questions were answered. The patient agrees with the plan. A total of 15 minutes was spent with the patient of which over half of the time was spent counseling the patient. Mary Tesfaye MD 11/8/2017 9:22 AM    Note: Although these clinic notes were documented by the provider at the time of the exam, they have not been proofed and are subject to transcription variance.

## 2017-11-17 RX ORDER — DIAZEPAM 5 MG/1
10 TABLET ORAL ONCE
Status: CANCELLED | OUTPATIENT
Start: 2017-11-28 | End: 2017-11-28

## 2017-12-21 ENCOUNTER — DOCUMENTATION ONLY (OUTPATIENT)
Dept: PAIN MANAGEMENT | Age: 82
End: 2017-12-21

## 2018-02-01 ENCOUNTER — HOSPITAL ENCOUNTER (OUTPATIENT)
Dept: PHYSICAL THERAPY | Age: 83
Discharge: HOME | End: 2018-02-01
Payer: MEDICARE

## 2018-02-01 PROCEDURE — 97161 PT EVAL LOW COMPLEX 20 MIN: CPT

## 2018-02-01 PROCEDURE — G8979 MOBILITY GOAL STATUS: HCPCS

## 2018-02-01 PROCEDURE — G8978 MOBILITY CURRENT STATUS: HCPCS

## 2018-02-01 PROCEDURE — 97110 THERAPEUTIC EXERCISES: CPT

## 2018-02-01 NOTE — PROGRESS NOTES
[facility]  [address]  [place], [address]  [phone] fax    Plan of Care/ Statement of Necessity for Physical Therapy Services    Patient name: Kenji Uribe Start of Care: 2018   Referral source: Alec Richards MD : [id number]    Medical Diagnosis: Bilateral hip pain [M25.551, M25.552]  Low back pain [M54.5]   Onset Date:chronic    Treatment Diagnosis: chronic LBP   Prior Hospitalization: see medical history Provider#: [id number]   Medications: Verified on Patient summary List    Comorbidities: hearing impairment, asthma, bladder cancer   Prior Level of Function: functionally I with all activities      The Plan of Care and following information is based on the information from the initial evaluation. Assessment/ key information: 79 y/o male presents to PT with c/o LBP that started in his \"youth\". He demonstrates diminished posture with increased t/s kyphosis and forward head and rounded shoulders. Decreased mobility noted t/s and l/s. Pt with significant B LE flexibility restrictions. Lumbar AROM is limited in all planes of motion with pain into extension. Pt will benefit from PT to address the aforementioned impairments.      Evaluation Complexity History MEDIUM  Complexity : 1-2 comorbidities / personal factors will impact the outcome/ POC ; Examination LOW Complexity : 1-2 Standardized tests and measures addressing body structure, function, activity limitation and / or participation in recreation  ;Presentation LOW Complexity : Stable, uncomplicated  ;Clinical Decision Making MEDIUM Complexity : FOTO score of 26-74  Overall Complexity Rating: LOW   Problem List: pain affecting function, decrease ROM, decrease strength, impaired gait/ balance, decrease ADL/ functional abilitiies, decrease activity tolerance and decrease flexibility/ joint mobility   Treatment Plan may include any combination of the following: Therapeutic exercise, Therapeutic activities, Neuromuscular re-education, Physical agent/modality, Gait/balance training, Manual therapy, Aquatic therapy, Patient education and Self Care training  Patient / Family readiness to learn indicated by: asking questions, trying to perform skills and interest  Persons(s) to be included in education: patient (P)  Barriers to Learning/Limitations: None  Patient Goal (s): To improve mobility and flexibility  Patient Self Reported Health Status: good  Rehabilitation Potential: fair    Short Term Goals: To be accomplished in 1 weeks:   1. Pt will be I and compliant with HEP  Long Term Goals: To be accomplished in 3-4 weeks:   1. Improve FOTO to 57 to improve ability for daily tasks   2. Pt will report have just \"a little difficulty\" with walking 2 blocks   3. Pt will demonstrate independence with supine to sit transfers. 4. 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    Frequency / Duration: Patient to be seen 2 times per week for 3-4 weeks. Patient/ Caregiver education and instruction: Diagnosis, prognosis, self care, activity modification and exercises   [x]  Plan of care has been reviewed with PTA    G-Codes (GP)  Mobility   Current  CK= 40-59%   Goal  CK= 40-59%      The severity rating is based on clinical judgment and the FOTO score. Certification Period: 2-1-18 to 04-02-18  Angie Silveira PT 2/1/2018 11:01 AM    ________________________________________________________________________    I certify that the above Therapy Services are being furnished while the patient is under my care. I agree with the treatment plan and certify that this therapy is necessary.     [de-identified] Signature:____________________  Date:____________Time: _________    Please sign and return to [facility] [address] [place] [address], [facility] [address]  [phone] fax

## 2018-02-01 NOTE — PROGRESS NOTES
PT DAILY TREATMENT NOTE - [facility]     Patient Name: Kenji Uribe  Date:2018  : 1929  [x]  Patient  Verified  Payor: [place] MEDICARE / Plan: [place] MEDICARE PART A & B / Product Type: Medicare /    In time:1010  Out time:1050  Total Treatment Time (min): 40  Total Timed Codes (min): 10  1:1 Treatment Time ( [address] only): 50   Visit #: 1 of 6    Treatment Area: Bilateral hip pain [M25.551, M25.552]  Low back pain [M54.5]    SUBJECTIVE  Pain Level (0-10 scale): 4  Any medication changes, allergies to medications, adverse drug reactions, diagnosis change, or new procedure performed?: [x] No    [] Yes (see summary sheet for update)  Subjective functional status/changes:   [] No changes reported       OBJECTIVE    Modality rationale:    Min Type Additional Details    [] Estim:  []Unatt       []IFC  []Premod                        []Other:  []w/ice   []w/heat  Position:  Location:    [] Estim: []Att    []TENS instruct  []NMES                    []Other:  []w/US   []w/ice   []w/heat  Position:  Location:    []  Traction: [] Cervical       []Lumbar                       [] Prone          []Supine                       []Intermittent   []Continuous Lbs:  [] before manual  [] after manual    []  Ultrasound: []Continuous   [] Pulsed                           []1MHz   []3MHz W/cm2:  Location:    []  Iontophoresis with dexamethasone         Location: [] Take home patch   [] In clinic    []  Ice     []  heat  []  Ice massage  []  Laser   []  Anodyne Position:  Location:    []  Laser with stim  []  Other:  Position:  Location:    []  Vasopneumatic Device Pressure:       [] lo [] med [] hi   Temperature: [] lo [] med [] hi   [] Skin assessment post-treatment:  []intact []redness- no adverse reaction    []redness  adverse reaction:     30 min [x]Eval                  []Re-Eval       10 min Therapeutic Exercise:  [] See flow sheet : HEP   Rationale: increase ROM and increase strength to improve the patients ability to perform daily tasks              With   [] TE   [] TA   [] neuro   [] other: Patient Education: [x] Review HEP    [] Progressed/Changed HEP based on:   [] positioning   [] body mechanics   [] transfers   [] heat/ice application    [] other:      Other Objective/Functional Measures:       Pain Level (0-10 scale) post treatment: 5    ASSESSMENT/Changes in Function:      Patient will continue to benefit from skilled PT services to modify and progress therapeutic interventions, address functional mobility deficits, address ROM deficits, address strength deficits, analyze and address soft tissue restrictions, analyze and cue movement patterns, analyze and modify body mechanics/ergonomics, assess and modify postural abnormalities and address imbalance/dizziness to attain remaining goals.      [x]  See Plan of Care  []  See progress note/recertification  []  See Discharge Summary         Progress towards goals / Updated goals:  Per POC    PLAN  []  Upgrade activities as tolerated     [x]  Continue plan of care  []  Update interventions per flow sheet       []  Discharge due to:_  []  Other:_      Orelia Sacks, PT 2/1/2018  10:59 AM    Future Appointments  Date Time Provider Bennie Krueger   2/5/2018 2:00 PM Vianey Boykin, PTA [facility]   2/7/2018 2:00 PM Giovanna Foster [facility]   2/12/2018 2:00 PM Tanya Khanna [facility]   2/14/2018 3:00 PM Vianey Boykin PTA [facility]   2/19/2018 2:30 PM Vianey Boykin PTA [facility]   2/21/2018 4:00 PM Orelia Sacks, PT [facility]   9/6/2018 2:00 PM Aniyah Reed MD [address] [place] [address]

## 2018-02-05 ENCOUNTER — APPOINTMENT (OUTPATIENT)
Dept: PHYSICAL THERAPY | Age: 83
End: 2018-02-05
Payer: MEDICARE

## 2018-02-06 ENCOUNTER — HOSPITAL ENCOUNTER (OUTPATIENT)
Dept: PHYSICAL THERAPY | Age: 83
Discharge: HOME | End: 2018-02-06
Payer: MEDICARE

## 2018-02-06 PROCEDURE — 97112 NEUROMUSCULAR REEDUCATION: CPT

## 2018-02-06 PROCEDURE — 97110 THERAPEUTIC EXERCISES: CPT

## 2018-02-06 NOTE — PROGRESS NOTES
PT DAILY TREATMENT NOTE - [facility]     Patient Name: Ag Lynn  Date:2018  : 1929  [x]  Patient  Verified  Payor: [place] MEDICARE / Plan: VA MEDICARE PART A & B / Product Type: Medicare /    In time:230  Out time:320  Total Treatment Time (min): 50  Total Timed Codes (min): 50  1:1 Treatment Time ([facility] only): 40   Visit #: 2 of 6    Treatment Area: Bilateral hip pain [M25.551, M25.552]  Low back pain [M54.5]    SUBJECTIVE  Pain Level (0-10 scale): 3-4  Any medication changes, allergies to medications, adverse drug reactions, diagnosis change, or new procedure performed?: [x] No    [] Yes (see summary sheet for update)  Subjective functional status/changes:   [x] No changes reported      OBJECTIVE    42 min Therapeutic Exercise:  [] See flow sheet :   Rationale: increase ROM and increase strength to improve the patients ability to perform daily activities    8 min Neuromuscular Re-education:  []  See flow sheet :   Rationale: increase strength and improve balance  to improve the patients ability to ambulate with decreased risk for falls       With   [] TE   [] TA   [] neuro   [] other: Patient Education: [x] Review HEP    [] Progressed/Changed HEP based on:   [] positioning   [] body mechanics   [] transfers   [] heat/ice application    [] other:      Other Objective/Functional Measures:      Pain Level (0-10 scale) post treatment: 3-4    ASSESSMENT/Changes in Function: Cueing for proper form with most exercises. Add posterior capsule stretch for hips next visit. Patient will continue to benefit from skilled PT services to modify and progress therapeutic interventions, address functional mobility deficits, address ROM deficits, address strength deficits, analyze and address soft tissue restrictions, analyze and cue movement patterns and assess and modify postural abnormalities to attain remaining goals.      []  See Plan of Care  []  See progress note/recertification  []  See Discharge Summary Progress towards goals / Updated goals:  Short Term Goals: To be accomplished in 1 weeks:                        1. Pt will be I and compliant with HEP  Long Term Goals: To be accomplished in 3-4 weeks:                        1. Improve FOTO to 57 to improve ability for daily tasks                        2. Pt will report have just \"a little difficulty\" with walking 2 blocks                        3. Pt will demonstrate independence with supine to sit transfers.                          4. 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    PLAN  []  Upgrade activities as tolerated     []  Continue plan of care  []  Update interventions per flow sheet       []  Discharge due to:_  []  Other:_      Giuseppe Dueñas, PT 2/6/2018  3:20 PM    Future Appointments  Date Time Provider Bennie Krueger   2/9/2018 2:30 PM [facility]   2/12/2018 1:00 PM [facility]   2/14/2018 3:00 PM Giuseppe Dueñas, PT [facility]   2/19/2018 1:00 PM Cole Garces, PT [facility]   2/21/2018 4:00 PM Cole Garces, PT [facility]   9/6/2018 2:00 PM Kehinde Santamaria MD [address] [place] [address]

## 2018-02-07 ENCOUNTER — APPOINTMENT (OUTPATIENT)
Dept: PHYSICAL THERAPY | Age: 83
End: 2018-02-07
Payer: MEDICARE

## 2018-02-09 ENCOUNTER — HOSPITAL ENCOUNTER (OUTPATIENT)
Dept: PHYSICAL THERAPY | Age: 83
Discharge: HOME | End: 2018-02-09
Payer: MEDICARE

## 2018-02-09 PROCEDURE — 97112 NEUROMUSCULAR REEDUCATION: CPT

## 2018-02-09 PROCEDURE — 97110 THERAPEUTIC EXERCISES: CPT

## 2018-02-09 NOTE — PROGRESS NOTES
PT DAILY TREATMENT NOTE - [facility]     Patient Name: Kimberly Oliveira  Date:2018  : 1929  [x]  Patient  Verified  Payor: VA MEDICARE / Plan: VA MEDICARE PART A & B / Product Type: Medicare /    In time:230  Out time:314  Total Treatment Time (min): 44  Total Timed Codes (min): 44  1:1 Treatment Time ( [address] only): 24   Visit #: 3 of 6    Treatment Area: Bilateral hip pain [M25.551, M25.552]  Low back pain [M54.5]    SUBJECTIVE  Pain Level (0-10 scale): 0  Any medication changes, allergies to medications, adverse drug reactions, diagnosis change, or new procedure performed?: [x] No    [] Yes (see summary sheet for update)  Subjective functional status/changes:   [] No changes reported  I was sore after the last time. OBJECTIVE    34 min Therapeutic Exercise:  [] See flow sheet :   Rationale: increase ROM and increase strength to improve the patients ability to perform daily activities     10 min Neuromuscular Re-education:  []  See flow sheet :   Rationale: increase strength, improve coordination, improve balance and increase proprioception  to improve the patients stability for ADLs          With   [] TE   [] TA   [] neuro   [] other: Patient Education: [x] Review HEP    [] Progressed/Changed HEP based on:   [] positioning   [] body mechanics   [] transfers   [] heat/ice application    [] other:      Other Objective/Functional Measures:      Pain Level (0-10 scale) post treatment: 0    ASSESSMENT/Changes in Function: Added post hip capsule stretching. Good TA contraction with PPT. Able to to perform sit<>stand without UE assistance using ball squeeze prop.     Patient will continue to benefit from skilled PT services to modify and progress therapeutic interventions, address functional mobility deficits, address ROM deficits, address strength deficits, analyze and address soft tissue restrictions, analyze and cue movement patterns and assess and modify postural abnormalities to attain remaining goals.     []  See Plan of Care  []  See progress note/recertification  []  See Discharge Summary         Progress towards goals / Updated goals:  Short Term Goals: To be accomplished in 1 weeks:                        8. Pt will be I and compliant with HEP. Goal met 2/9/18  Long Term Goals: To be accomplished in 3-4 weeks:                        1.  Improve FOTO to 57 to improve ability for daily tasks                        5. Pt will report have just \"a little difficulty\" with walking 2 blocks                        3. Pt will demonstrate independence with supine to sit transfers.                         5. 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

## 2018-02-14 ENCOUNTER — HOSPITAL ENCOUNTER (OUTPATIENT)
Dept: PHYSICAL THERAPY | Age: 83
Discharge: HOME | End: 2018-02-14
Payer: MEDICARE

## 2018-02-14 PROCEDURE — 97110 THERAPEUTIC EXERCISES: CPT

## 2018-02-14 PROCEDURE — 97112 NEUROMUSCULAR REEDUCATION: CPT

## 2018-02-14 NOTE — PROGRESS NOTES
PT DAILY TREATMENT NOTE - [facility]     Patient Name: Chiquita Rodriguez  Date:2018  : 1929  [x]  Patient  Verified  Payor: Bharti Kwon / Plan: VA MEDICARE PART A & B / Product Type: Medicare /    In time:259  Out time:344  Total Treatment Time (min): 45  Total Timed Codes (min): 45  1:1 Treatment Time ( [address] only): 45   Visit #: 4 of 6    Treatment Area: Bilateral hip pain [M25.551, M25.552]  Low back pain [M54.5]    SUBJECTIVE  Pain Level (0-10 scale): 2  Any medication changes, allergies to medications, adverse drug reactions, diagnosis change, or new procedure performed?: [x] No    [] Yes (see summary sheet for update)  Subjective functional status/changes:   [] No changes reported  I'm mainly just stiff. OBJECTIVE    37 min Therapeutic Exercise:  [] See flow sheet :   Rationale: increase ROM and increase strength to improve the patients ability to perform daily activities     8 min Neuromuscular Re-education:  []  See flow sheet :   Rationale: increase strength, improve coordination, improve balance and increase proprioception  to improve the patients ability to ambulate/decrease risk for falls       With   [] TE   [] TA   [] neuro   [] other: Patient Education: [x] Review HEP    [] Progressed/Changed HEP based on:   [] positioning   [] body mechanics   [] transfers   [] heat/ice application    [] other:      Other Objective/Functional Measures:      Pain Level (0-10 scale) post treatment: 0    ASSESSMENT/Changes in Function: No difficulty with sit< stand. Strength and stability are improving; patient able to perform MSR and Romberg x30\" with EC. Patient will continue to benefit from skilled PT services to modify and progress therapeutic interventions, address functional mobility deficits, address ROM deficits, address strength deficits, analyze and address soft tissue restrictions and analyze and cue movement patterns to attain remaining goals.      []  See Plan of Care  []  See progress note/recertification  []  See Discharge Summary         Progress towards goals / Updated goals:  Short Term Goals: To be accomplished in 1 weeks:                        1. Pt will be I and compliant with HEP. Goal met 2/9/18  Long Term Goals: To be accomplished in 3-4 weeks:                        1. Improve FOTO to 57 to improve ability for daily tasks                        9. Pt will report have just \"a little difficulty\" with walking 2 blocks                        3. Pt will demonstrate independence with supine to sit transfers.  Goal met 2/14/18               4. 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

## 2018-02-16 ENCOUNTER — HOSPITAL ENCOUNTER (OUTPATIENT)
Dept: PHYSICAL THERAPY | Age: 83
Discharge: HOME | End: 2018-02-16
Payer: MEDICARE

## 2018-02-16 PROCEDURE — 97112 NEUROMUSCULAR REEDUCATION: CPT

## 2018-02-16 PROCEDURE — 97110 THERAPEUTIC EXERCISES: CPT

## 2018-02-16 NOTE — PROGRESS NOTES
PT DAILY TREATMENT NOTE - [facility]     Patient Name: Marshal Lira  Date:2018  : 1929  [x]  Patient  Verified  Payor: Joaquin Mott / Plan: VA MEDICARE PART A & B / Product Type: Medicare /    In time:2:30  Out time: 3:22  Total Treatment Time (min): 52  Total Timed Codes (min):52  1:1 Treatment Time ( [address] only): 52  Visit #: 5 of -    Treatment Area: Bilateral hip pain [M25.551, M25.552]  Low back pain [M54.5]    SUBJECTIVE  Pain Level (0-10 scale): 2  Any medication changes, allergies to medications, adverse drug reactions, diagnosis change, or new procedure performed?: [x] No    [] Yes (see summary sheet for update)  Subjective functional status/changes:   [] No changes reported  Pt reports he is very stiff today. OBJECTIVE    42 min Therapeutic Exercise:  [x] See flow sheet :   Rationale: increase ROM and increase strength to improve the patients ability to perform daily activities     10 min Neuromuscular Re-education:  [x]  See flow sheet :   Rationale: increase strength, improve coordination, improve balance and increase proprioception  to improve the patients ability to ambulate/decrease risk for falls       With   [] TE   [] TA   [] neuro   [] other: Patient Education: [x] Review HEP    [] Progressed/Changed HEP based on:   [] positioning   [] body mechanics   [] transfers   [] heat/ice application    [] other:      Other Objective/Functional Measures: increased reps with clams. FOTO:45     Pain Level (0-10 scale) post treatment: 2    ASSESSMENT/Changes in Function: pt tolerated increase in therex well. He demonstrates good tolerance to therex. FOTO decreased but he reports improvement.      Patient will continue to benefit from skilled PT services to modify and progress therapeutic interventions, address functional mobility deficits, address ROM deficits, address strength deficits, analyze and address soft tissue restrictions and analyze and cue movement patterns to attain remaining goals.     []  See Plan of Care  []  See progress note/recertification  []  See Discharge Summary         Progress towards goals / Updated goals:  Short Term Goals: To be accomplished in 1 weeks:                        0. Pt will be I and compliant with HEP. Goal met 2/9/18  Long Term Goals: To be accomplished in 3-4 weeks:                        1. Improve FOTO to 57 to improve ability for daily tasks- declined 2-16-18                        2. Pt will report have just \"a little difficulty\" with walking 2 blocks                        1. Pt will demonstrate independence with supine to sit transfers.  Goal met 2/14/18               4. 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

## 2018-02-19 ENCOUNTER — HOSPITAL ENCOUNTER (OUTPATIENT)
Dept: PHYSICAL THERAPY | Age: 83
Discharge: HOME | End: 2018-02-19
Payer: MEDICARE

## 2018-02-19 PROCEDURE — 97110 THERAPEUTIC EXERCISES: CPT

## 2018-02-19 PROCEDURE — 97112 NEUROMUSCULAR REEDUCATION: CPT

## 2018-02-19 NOTE — PROGRESS NOTES
PT DAILY TREATMENT NOTE - [facility]     Patient Name: Kenji Uribe  Date:2018  : 1929  [x]  Patient  Verified  Payor: VA MEDICARE / Plan: VA MEDICARE PART A & B / Product Type: Medicare /    In time:1:00  Out time: 1:52  Total Treatment Time (min): 52  Total Timed Codes (min):52  1:1 Treatment Time ( [address] only): 30  Visit #: 5 of 6-8    Treatment Area: Bilateral hip pain [M25.551, M25.552]  Low back pain [M54.5]    SUBJECTIVE  Pain Level (0-10 scale): 3  Any medication changes, allergies to medications, adverse drug reactions, diagnosis change, or new procedure performed?: [x] No    [] Yes (see summary sheet for update)  Subjective functional status/changes:   [] No changes reported  \"I'm just stiff\"    OBJECTIVE    42 min Therapeutic Exercise:  [x] See flow sheet :   Rationale: increase ROM and increase strength to improve the patients ability to perform daily activities     10 min Neuromuscular Re-education:  [x]  See flow sheet :   Rationale: increase strength, improve coordination, improve balance and increase proprioception  to improve the patients ability to ambulate/decrease risk for falls       With   [] TE   [] TA   [] neuro   [] other: Patient Education: [x] Review HEP    [] Progressed/Changed HEP based on:   [] positioning   [] body mechanics   [] transfers   [] heat/ice application    [] other:      Other Objective/Functional Measures: no changes to therex     Pain Level (0-10 scale) post treatment: 2-3    ASSESSMENT/Changes in Function:  Pt with good effort with treatment. Slight decrease in pain and stiffness following treatment. Patient will continue to benefit from skilled PT services to modify and progress therapeutic interventions, address functional mobility deficits, address ROM deficits, address strength deficits, analyze and address soft tissue restrictions and analyze and cue movement patterns to attain remaining goals.      []  See Plan of Care  []  See progress note/recertification  []  See Discharge Summary         Progress towards goals / Updated goals:  Short Term Goals: To be accomplished in 1 weeks:                        1. Pt will be I and compliant with HEP. Goal met 2/9/18  Long Term Goals: To be accomplished in 3-4 weeks:                        1. Improve FOTO to 57 to improve ability for daily tasks- declined 2-16-18                        2. Pt will report have just \"a little difficulty\" with walking 2 blocks                        9. Pt will demonstrate independence with supine to sit transfers.  Goal met 2/14/18               4. 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

## 2018-02-21 ENCOUNTER — HOSPITAL ENCOUNTER (OUTPATIENT)
Dept: PHYSICAL THERAPY | Age: 83
Discharge: HOME | End: 2018-02-21
Payer: MEDICARE

## 2018-02-21 PROCEDURE — 97112 NEUROMUSCULAR REEDUCATION: CPT

## 2018-02-21 PROCEDURE — 97110 THERAPEUTIC EXERCISES: CPT

## 2018-02-21 NOTE — PROGRESS NOTES
PT DAILY TREATMENT NOTE - [facility]     Patient Name: Jericho Muir  Date:2018  : 1929  [x]  Patient  Verified  Payor: Harper Maynard / Plan: VA MEDICARE PART A & B / Product Type: Medicare /    In time:4:00  Out time: 4:36  Total Treatment Time (min): 36   Total Timed Codes (min):36  1:1 Treatment Time ( [address] only): 30  Visit #: 7 of 8    Treatment Area: Bilateral hip pain [M25.551, M25.552]  Low back pain [M54.5]    SUBJECTIVE  Pain Level (0-10 scale): 5  Any medication changes, allergies to medications, adverse drug reactions, diagnosis change, or new procedure performed?: [x] No    [] Yes (see summary sheet for update)  Subjective functional status/changes:   [] No changes reported  Pt with c/o some increase in pain today after twisting wrong getting out of bed. OBJECTIVE    26 min Therapeutic Exercise:  [x] See flow sheet :   Rationale: increase ROM and increase strength to improve the patients ability to perform daily activities     10 min Neuromuscular Re-education:  [x]  See flow sheet :   Rationale: increase strength, improve coordination, improve balance and increase proprioception  to improve the patients ability to ambulate/decrease risk for falls       With   [] TE   [] TA   [] neuro   [] other: Patient Education: [x] Review HEP    [] Progressed/Changed HEP based on:   [] positioning   [] body mechanics   [] transfers   [] heat/ice application    [] other:      Other Objective/Functional Measures: held therex per flow sheet due to increase in hip pain today    Pain Level (0-10 scale) post treatment: 3    ASSESSMENT/Changes in Function: diminished pain following treatment. Patient will continue to benefit from skilled PT services to modify and progress therapeutic interventions, address functional mobility deficits, address ROM deficits, address strength deficits, analyze and address soft tissue restrictions and analyze and cue movement patterns to attain remaining goals.      []  See Plan of Care  []  See progress note/recertification  []  See Discharge Summary         Progress towards goals / Updated goals:  Short Term Goals: To be accomplished in 1 weeks:                        1. Pt will be I and compliant with HEP. Goal met 2/9/18  Long Term Goals: To be accomplished in 3-4 weeks:                        1. Improve FOTO to 57 to improve ability for daily tasks- declined 2-16-18                        2. Pt will report have just \"a little difficulty\" with walking 2 blocks                        2. Pt will demonstrate independence with supine to sit transfers.  Goal met 2/14/18               4. 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

## 2018-02-27 ENCOUNTER — HOSPITAL ENCOUNTER (OUTPATIENT)
Dept: PHYSICAL THERAPY | Age: 83
Discharge: HOME | End: 2018-02-27
Payer: MEDICARE

## 2018-02-27 PROCEDURE — 97112 NEUROMUSCULAR REEDUCATION: CPT

## 2018-02-27 PROCEDURE — G8978 MOBILITY CURRENT STATUS: HCPCS

## 2018-02-27 PROCEDURE — G8979 MOBILITY GOAL STATUS: HCPCS

## 2018-02-27 PROCEDURE — 97110 THERAPEUTIC EXERCISES: CPT

## 2018-02-27 NOTE — PROGRESS NOTES
PT DAILY TREATMENT NOTE - [facility]     Patient Name: Sonya Landers  Date:2018  : 1929  [x]  Patient  Verified  Payor: Tk Singh / Plan: [place] MED[place] & B / Product Type: Medicare /    In time:12:30  Out time: 1:12  Total Treatment Time (min):42    Total Timed Codes (min):42  1:1 Treatment Time [facility] only):42   Visit #: 8 of 8    Treatment Area: Bilateral hip pain [M25.551, M25.552]  Low back pain [M54.5]    SUBJECTIVE  Pain Level (0-10 scale): 5  Any medication changes, allergies to medications, adverse drug reactions, diagnosis change, or new procedure performed?: [x] No    [] Yes (see summary sheet for update)  Subjective functional status/changes:   [] No changes reported  Pt reports he is able to walk for about 20 min without difficulty. OBJECTIVE    32 min Therapeutic Exercise:  [x] See flow sheet :   Rationale: increase ROM and increase strength to improve the patients ability to perform daily activities     10 min Neuromuscular Re-education:  [x]  See flow sheet :   Rationale: increase strength, improve coordination, improve balance and increase proprioception  to improve the patients ability to ambulate/decrease risk for falls       With   [] TE   [] TA   [] neuro   [] other: Patient Education: [x] Review HEP    [] Progressed/Changed HEP based on:   [] positioning   [] body mechanics   [] transfers   [] heat/ice application    [] other:      Other Objective/Functional Measures: added Airex with standing balance. Pain Level (0-10 scale) post treatment: 2    ASSESSMENT/Changes in Function: pt demonstrates improved ability for gait and exercise at home. FOTO decreased but he reports improvement.      Patient will continue to benefit from skilled PT services to modify and progress therapeutic interventions, address functional mobility deficits, address ROM deficits, address strength deficits, analyze and address soft tissue restrictions and analyze and cue movement patterns to attain remaining goals. []  See Plan of Care  [x]  See progress note/recertification  []  See Discharge Summary         Progress towards goals / Updated goals:  Short Term Goals: To be accomplished in 1 weeks:                        1. Pt will be I and compliant with HEP. Goal met 2/9/18  Long Term Goals: To be accomplished in 3-4 weeks:                        1. Improve FOTO to 57 to improve ability for daily tasks- declined 2-16-18                        2. Pt will report have just \"a little difficulty\" with walking 2 blocks - MET 2-27-18                        3. Pt will demonstrate independence with supine to sit transfers.  Goal met 2/14/18               4. 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

## 2018-02-27 NOTE — PROGRESS NOTES
[facility]  [address]  [place], [address]  [phone] fax    Medicare Progress Report    Patient name: Keyanna Carias Start of Care: 2018   Referral source: Cy Guardado MD : 1929                         Medical Diagnosis: Bilateral hip pain [M25.551, M25.552]  Low back pain [M54.5] Onset Date:chronic                         Treatment Diagnosis: chronic LBP   Prior Hospitalization: see medical history Provider#: [id number]   Medications: Verified on Patient summary List    Comorbidities: hearing impairment, asthma, bladder cancer   Prior Level of Function: functionally I with all activities    Visits from Start of Care: 8    Missed Visits: 1    Reporting Period: 18 to 18    Subjective Reports: pt reports he feels PT has been helpful to date. Progress towards goals :  Short Term Goals: To be accomplished in 1 weeks:                        1. Pt will be I and compliant with HEP. Goal met 18  Long Term Goals: To be accomplished in 3-4 weeks:                        1. Improve FOTO to 57 to improve ability for daily tasks- declined 18                        2. Pt will report have just \"a little difficulty\" with walking 2 blocks - MET 18                        3. Pt will demonstrate independence with supine to sit transfers. Goal met 18                                  4. 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 He reports he is able to walk for about 20 minutes without difficulty. He will benefit from continuation      Problems/ barriers to goal attainment: none     Assessment / Recommendations:pt will benefit from continuation of PT to further progress.      Problem List: pain affecting function, decrease ROM, decrease strength, impaired gait/ balance, decrease ADL/ functional abilitiies, decrease activity tolerance and decrease flexibility/ joint mobility   Treatment Plan: Therapeutic exercise, Therapeutic activities, Neuromuscular re-education, Physical agent/modality, Gait/balance training, Manual therapy and Patient education    Patient Goal (s) has been updated and includes: \"To keep improving\"     Updated Goals to be accomplished in 2 weeks:           1. Improve FOTO to 57 to improve ability for daily tasks- declined 2-16-18           2. 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       Sherri Barragan, PT 2/27/2018 12:53 PM

## 2018-03-01 ENCOUNTER — HOSPITAL ENCOUNTER (OUTPATIENT)
Dept: PHYSICAL THERAPY | Age: 83
Discharge: HOME | End: 2018-03-01
Payer: MEDICARE

## 2018-03-01 PROCEDURE — 97112 NEUROMUSCULAR REEDUCATION: CPT

## 2018-03-01 PROCEDURE — 97110 THERAPEUTIC EXERCISES: CPT

## 2018-03-01 NOTE — PROGRESS NOTES
PT DAILY TREATMENT NOTE - [facility]     Patient Name: Ramo Phillips  Date:3/1/2018  : 1929  [x]  Patient  Verified  Payor: Anjali Wang / Plan: [place] MEDICARE PART A & B / Product Type: Medicare /    In time:10:30  Out time:11:16  Total Treatment Time (min): 46  Total Timed Codes (min): 46  1:1 Treatment Time ( [address] only): 33  Visit #: 1 of 4    Treatment Area: Bilateral hip pain [M25.551, M25.552]  Low back pain [M54.5]    SUBJECTIVE  Pain Level (0-10 scale): 2  Any medication changes, allergies to medications, adverse drug reactions, diagnosis change, or new procedure performed?: [x] No    [] Yes (see summary sheet for update)  Subjective functional status/changes:   [] No changes reported  \"Its stiff, but that's normal\"    OBJECTIVE    38 min Therapeutic Exercise:  [] See flow sheet :   Rationale: increase ROM and increase strength to improve the patients ability to perform daily tasks    8 min Neuromuscular Re-education:  []  See flow sheet : static balance, hip hinges with stick   Rationale: improve coordination, improve balance and increase proprioception  to improve the patients core stability and peripheral stability with ADLs            With   [] TE   [] TA   [] neuro   [] other: Patient Education: [x] Review HEP    [] Progressed/Changed HEP based on:   [] positioning   [] body mechanics   [] transfers   [] heat/ice application    [] other:      Other Objective/Functional Measures: performed hip hinges with stick at waist to improve lumbopelvic mechanics     Pain Level (0-10 scale) post treatment: 3    ASSESSMENT/Changes in Function: Pt with good tolerance to all interventions still reports some stiffness in his back. Continue to progress lumbopelvic mechanics and core stability.     Patient will continue to benefit from skilled PT services to modify and progress therapeutic interventions, address functional mobility deficits, address ROM deficits, address strength deficits, analyze and address soft tissue restrictions, analyze and cue movement patterns and analyze and modify body mechanics/ergonomics to attain remaining goals. []  See Plan of Care  []  See progress note/recertification  []  See Discharge Summary         Updated Goals to be accomplished in 2 weeks:           1. Improve FOTO to 57 to improve ability for daily tasks- declined 2-16-18           2.  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- progressing 2-27-18    PLAN  [x]  Upgrade activities as tolerated     []  Continue plan of care  []  Update interventions per flow sheet       []  Discharge due to:_  []  Other:_      Chanelle Jain, DPT, CMTPT 3/1/2018  10:34 AM    Future Appointments  Date Time Provider Bennie Krueger   3/7/2018 10:00 AM [address] [place] [address]   3/9/2018 10:00 AM [facility]   3/14/2018 11:00 AM [facility]   3/16/2018 2:30 PM [address] [place] [address]   9/6/2018 2:00 PM Shweta Zamora MD [address] [place] [address]

## 2018-03-07 ENCOUNTER — HOSPITAL ENCOUNTER (OUTPATIENT)
Dept: PHYSICAL THERAPY | Age: 83
Discharge: HOME | End: 2018-03-07
Payer: MEDICARE

## 2018-03-07 PROCEDURE — 97110 THERAPEUTIC EXERCISES: CPT

## 2018-03-07 PROCEDURE — 97112 NEUROMUSCULAR REEDUCATION: CPT

## 2018-03-07 NOTE — PROGRESS NOTES
PT DAILY TREATMENT NOTE - [facility]     Patient Name: Sanchez Olguin  Date:3/7/2018  : 1929  [x]  Patient  Verified  Payor: [place] MEDICARE / Plan: [place] MEDICARE PART A & B / Product Type: Medicare /    In time:10:00am  Out time:10:45am  Total Treatment Time (min): 45  Total Timed Codes (min): 45  1:1 Treatment Time ( [address] only): 40   Visit #: 2 of 4    Treatment Area: Bilateral hip pain [M25.551, M25.552]  Low back pain [M54.5]    SUBJECTIVE  Pain Level (0-10 scale): 3  Any medication changes, allergies to medications, adverse drug reactions, diagnosis change, or new procedure performed?: [x] No    [] Yes (see summary sheet for update)  Subjective functional status/changes:   [] No changes reported  Pt reports R sided lower back discomfort d/t \"arthritis\". OBJECTIVE    37 min Therapeutic Exercise:  [x] See flow sheet :   Rationale: increase ROM and increase strength to improve the patients ability to improve ease of ADLs. 8 min Neuromuscular Re-education:  [x]  See flow sheet :   Rationale: increase strength, improve coordination, improve balance and increase proprioception  to improve the patients ability to improve ease of ADLs. With   [] TE   [] TA   [] neuro   [] other: Patient Education: [x] Review HEP    [] Progressed/Changed HEP based on:   [] positioning   [] body mechanics   [] transfers   [] heat/ice application    [] other:      Other Objective/Functional Measures: mild instability without overt LOB with interventions intermittently    Requires cueing for hip hinge mechanics to improve hip utilization     Pain Level (0-10 scale) post treatment: 3    ASSESSMENT/Changes in Function: Pt reports no change in back pain, but tolerated all therex well without limitations.     Patient will continue to benefit from skilled PT services to modify and progress therapeutic interventions, address functional mobility deficits, address ROM deficits, address strength deficits, analyze and address soft tissue restrictions, analyze and cue movement patterns and analyze and modify body mechanics/ergonomics to attain remaining goals. []  See Plan of Care  []  See progress note/recertification  []  See Discharge Summary         Progress towards goals / Updated goals:  Updated Goals to be accomplished in 2 weeks:           1.  Improve FOTO to 57 to improve ability for daily tasks- declined 2-16-18           2. 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

## 2018-03-09 ENCOUNTER — HOSPITAL ENCOUNTER (OUTPATIENT)
Dept: PHYSICAL THERAPY | Age: 83
Discharge: HOME | End: 2018-03-09
Payer: MEDICARE

## 2018-03-09 PROCEDURE — 97140 MANUAL THERAPY 1/> REGIONS: CPT

## 2018-03-09 PROCEDURE — 97112 NEUROMUSCULAR REEDUCATION: CPT

## 2018-03-09 NOTE — PROGRESS NOTES
PT DAILY TREATMENT NOTE - [facility]     Patient Name: Vivienne Moreno  Date:3/9/2018  : 1929  [x]  Patient  Verified  Payor: [place] MEDICARE / Plan: [place] MEDICARE PART A & B / Product Type: Medicare /    In time:1005  Out time:1050  Total Treatment Time (min): Total Timed Codes (min): 35  1:1 Treatment Time ( only): 35   Visit #: 3 of 4    Treatment Area: Bilateral hip pain [M25.551, M25.552]  Low back pain [M54.5]    SUBJECTIVE  Pain Level (0-10 scale): 1-2  Any medication changes, allergies to medications, adverse drug reactions, diagnosis change, or new procedure performed?: [x] No    [] Yes (see summary sheet for update)  Subjective functional status/changes:   [] No changes reported  No pain but stiffness in the R hip. Pt reports that after Pt on Wed, he was helping his wife in the kitchen and he had to sit down because he though the was going to fall down. OBJECTIVE    12 min Therapeutic Exercise:  [x] See flow sheet :   Rationale: increase ROM and increase strength to improve the patients ability to improve ease of ADLs    15 min Neuromuscular Re-education:  [x]  See flow sheet :   Rationale: increase ROM, increase strength and improve balance  to improve the patients ability to participate in ADLs    8 min Manual Therapy: To post capsule of B hips and l/s   Rationale: decrease pain, increase ROM and increase tissue extensibility to increase patients ability to ambulate with less stiffness and ease at home and in the community          With   [] TE   [] TA   [] neuro   [] other: Patient Education: [x] Review HEP    [] Progressed/Changed HEP based on:   [] positioning   [] body mechanics   [] transfers   [] heat/ice application    [] other:      Other Objective/Functional Measures: repeated retropulsion in sit to stand and early fatigue in standing hamstring and glute max exercises; was able to complete after short break.       Pain Level (0-10 scale) post treatment: 1    ASSESSMENT/Changes in Function:   Pt tolerated exercises well. No c/o of increased pain, just stiffness. Pt benefits from simple, 1-step explanations. Pt reports back pain as being more of a stiffness now. Patient will continue to benefit from skilled PT services to modify and progress therapeutic interventions, address functional mobility deficits, analyze and address soft tissue restrictions and assess and modify postural abnormalities to attain remaining goals. []  See Plan of Care  []  See progress note/recertification  []  See Discharge Summary         Progress towards goals / Updated goals:  Updated Goals to be accomplished in 2 weeks:           1.  Improve FOTO to 57 to improve ability for daily tasks- not yet met 3-9-18           2. 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

## 2018-03-14 ENCOUNTER — HOSPITAL ENCOUNTER (OUTPATIENT)
Dept: PHYSICAL THERAPY | Age: 83
Discharge: HOME | End: 2018-03-14
Payer: MEDICARE

## 2018-03-14 PROCEDURE — G8979 MOBILITY GOAL STATUS: HCPCS

## 2018-03-14 PROCEDURE — G8980 MOBILITY D/C STATUS: HCPCS

## 2018-03-14 PROCEDURE — 97110 THERAPEUTIC EXERCISES: CPT

## 2018-03-14 NOTE — PROGRESS NOTES
PT DAILY TREATMENT NOTE - [facility]     Patient Name: Kenji Uribe  Date:3/14/2018  : 1929  [x]  Patient  Verified  Payor: Tracie Eldridge / Plan: [place] MEDICARE PART A & B / Product Type: Medicare /    In time:11:00am  Out time:11:36am  Total Treatment Time (min): 36  Total Timed Codes (min): 36  1:1 Treatment Time ( [address] only): 34   Visit #: 4 of 4    Treatment Area: Bilateral hip pain [M25.551, M25.552]  Low back pain [M54.5]    SUBJECTIVE  Pain Level (0-10 scale): 2  Any medication changes, allergies to medications, adverse drug reactions, diagnosis change, or new procedure performed?: [x] No    [] Yes (see summary sheet for update)  Subjective functional status/changes:   [] No changes reported  Pt reports stiffness in the morning that abates as he \"gets moving around\". OBJECTIVE    36 min Therapeutic Exercise:  [x] See flow sheet :   Rationale: increase ROM, increase strength and improve coordination to improve the patients ability to improve ease of ADLs. With   [x] TE   [] TA   [] neuro   [] other: Patient Education: [x] Review HEP    [] Progressed/Changed HEP based on:   [] positioning   [] body mechanics   [] transfers   [] heat/ice application    [x] other: per HEP handout, instructed to perform 3x/week and to gradually progress into recreational exercise as tolerated. Other Objective/Functional Measures:     added squats, able to perform > 90 degrees knee flexion with fair technique void of pain reports    Minimal instability with higher level balance interventions, but independently recovers using appropriate balance strategies     Pain Level (0-10 scale) post treatment: 1    ASSESSMENT/Changes in Function: Pt has progressed well demonstrating no significant limitations during treatment despite minimal improvement in self reported functional status.  He demonstrates minimal instability with higher level balance interventions, but independently recovers his balance utilizing appropriate balance recovery strategies. He has been instructed in continuing HEP. DC from skilled PT at this time. []  See Plan of Care  []  See progress note/recertification  []  See Discharge Summary         Progress towards goals / Updated goals:  Updated Goals to be accomplished in 2 weeks:           1.  Improve FOTO to 57 to improve ability for daily tasks- not yet met 3-9-18 43 no change 3/14/2018           2. 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

## 2018-03-14 NOTE — PROGRESS NOTES
[facility]  [address]  [place], [address]  [phone] fax    Physical Therapy Discharge Summary    Patient name: Sanchez Olguin Start of Care: 2018   Referral source: Nena Esparza MD : 1929   Medical/Treatment Diagnosis: Bilateral hip pain [M25.551, M25.552]  Low back pain [M54.5] Onset Date:Chronic     Prior Hospitalization: see medical history Provider#: [id number]   Medications: Verified on Patient Summary List     Comorbidities: hearing impairment, asthma, bladder cancer   Prior Level of Function: functionally I with all activities  Visits from Start of Care: 12    Missed Visits: 0  Reporting Period : 2018 to 3/14/2018    Summary of Care:  Updated Goals to be accomplished in 2 weeks:           1. Improve FOTO to 57 to improve ability for daily tasks- not yet met 3-9-18 43 no change 3/14/2018           2. 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 ASSESSMENT/RECOMMENDATIONS: Pt has progressed well demonstrating no significant limitations during treatment despite minimal improvement in self reported functional status. He demonstrates minimal instability with higher level balance interventions, but independently recovers his balance utilizing appropriate balance recovery strategies. He has been instructed in continuing HEP. DC from skilled PT at this time.   [x]Discontinue therapy: [x]Patient has reached or is progressing toward set goals      []Patient is non-compliant or has abdicated      []Due to lack of appreciable progress towards set goals    Ronak Noriega, PT ,DPT, ATC 3/14/2018 12:49 PM

## 2018-03-16 ENCOUNTER — APPOINTMENT (OUTPATIENT)
Dept: PHYSICAL THERAPY | Age: 83
End: 2018-03-16
Payer: MEDICARE

## 2018-05-15 ENCOUNTER — HOSPITAL ENCOUNTER (OUTPATIENT)
Dept: PHYSICAL THERAPY | Age: 83
Discharge: HOME | End: 2018-05-15
Payer: MEDICARE

## 2018-05-15 PROCEDURE — G8978 MOBILITY CURRENT STATUS: HCPCS

## 2018-05-15 PROCEDURE — G8980 MOBILITY D/C STATUS: HCPCS

## 2018-05-15 PROCEDURE — 97161 PT EVAL LOW COMPLEX 20 MIN: CPT

## 2018-05-15 PROCEDURE — 97110 THERAPEUTIC EXERCISES: CPT

## 2018-05-15 PROCEDURE — G8979 MOBILITY GOAL STATUS: HCPCS

## 2018-05-15 NOTE — PROGRESS NOTES
[facility]  [address]  [place], [address]  [phone] fax    Plan of Care/ Statement of Necessity for Physical Therapy Services    Patient name: Angelita Rivas Start of Care: 5/15/2018   Referral source: Aldo Vega MD : 1929    Medical Diagnosis: Neck pain [M54.2]  Back pain [M54.9]   Onset Date: 1 month    Treatment Diagnosis: deconditioning   Prior Hospitalization: see medical history Provider#: [id number]   Medications: Verified on Patient summary List    Comorbidities: chronic neck pain, asthma, bladder cancer, hearing impaired   Prior Level of Function: independent with all activity and household chores/yardwork, chronic neck pain      The Plan of Care and following information is based on the information from the initial evaluation. Assessment/ key information: Pt is an 80year old male who reports he has been caring for his wife over the last month or so who may have cancer and has been ignoring his normal exercise regimen and, as a result, reports he feels a little weak at times. He also reports some occasional cramping, more so in his R arm in the morning. He reports he called his PCP office with regards to these concerns and reports they \"got confused\" and referred him for neck and back pain evaluation. He reports he has had neck pain for 14 years and has been advised that surgery is the only option several times per pt report, but he does not want to do that. With regards to evaluation of his neck today, he reports \"I don't want anybody messing with my neck\". As such, we will not be evaluating his neck at this time. With regards to his back, pt does not currently report complaints with regards to back pain or dysfunction.  As for his complaints regarding feelings of weakness, pt does not present with any clear indications of pathological weakness necessitating skilled medical intervention as evidenced by excellent scores with TUG, 5x sit to stand, and overall good MMT of both UEs and LEs. He has been instructed in general exercises as well as educated regarding community gym participation to facilitate self management and progression back into recreational exercise. DC from skilled PT at this time, as he does not present as a candidate for skilled medically necessary PT intervention. Evaluation Complexity History MEDIUM  Complexity : 1-2 comorbidities / personal factors will impact the outcome/ POC ; Examination LOW Complexity : 1-2 Standardized tests and measures addressing body structure, function, activity limitation and / or participation in recreation  ;Presentation LOW Complexity : Stable, uncomplicated  ;Clinical Decision Making MEDIUM Complexity : FOTO score of 26-74  Overall Complexity Rating: LOW   Problem List: decrease strength   Treatment Plan may include any combination of the following: Other: Home exercise  Patient / Family readiness to learn indicated by: asking questions, trying to perform skills and interest  Persons(s) to be included in education: patient (P)  Barriers to Learning/Limitations: None  Patient Goal (s): \"I just want to work on getting a little stronger. \"   Patient Self Reported Health Status: good  Rehabilitation Potential: poor    Short Term Goals: To be accomplished in NA  Frequency / Duration: Patient to be seen NA    Patient/ Caregiver education and instruction: Diagnosis, prognosis, activity modification and exercises   [x]  Plan of care has been reviewed with PTA    G-Codes (GP)  Mobility   Current  CK= 40-59%   Goal  CJ= 20-39%  D/C  CK= 40-59%    The severity rating is based on clinical judgment and the FOTO score.     Certification Period: 5/15/2018 - 5/15/2018  Victoria Saleem, PT, DPT, ATC 5/15/2018 7:05 PM    ________________________________________________________________________    I certify that the above Therapy Services are being furnished while the patient is under my care. I agree with the treatment plan and certify that this therapy is necessary.     [de-identified] Signature:____________________  Date:____________Time: _________    Please sign and return to [facility] [address] [place] Brittanie Ambriz [address] [place], [place] [address], [facility] [address]  [phone] fax

## 2018-05-15 NOTE — PROGRESS NOTES
PT DAILY TREATMENT NOTE/CERVICAL EVAL3-16    Patient Name: Hyland Gitelman  Date:5/15/2018  : 1929  [x]  Patient  Verified  [place]or: [place] MED[place]ARE / Plan: [place] MEDICARE PART A & B / Product Type: Medicare /    In time:10:15am  Out time:11:01am  Total Treatment Time (min): 46  Total Timed Codes (min): 14  1:1 Treatment Time ( only): 55   Visit #: 1 of 1    Treatment Area: Neck pain [M54.2]  Back pain [M54.9]    SUBJECTIVE  Pain Level (0-10 scale): 1  []constant []intermittent []improving []worsening []no change since onset    Any medication changes, allergies to medications, adverse drug reactions, diagnosis change, or new procedure performed?: [x] No    [] Yes (see summary sheet for update)  Subjective functional status/changes:    Pt reports he has been caring for his wife over the last month or so who may have cancer and has been ignoring his normal exercise regimen and, as a result, reports he feels a little weak at times. He also reports some occasional cramping, more so in his R arm in the morning. He reports he called his PCP office with regards to these concerns and reports they \"got confused\" and referred him for neck and back pain evaluation. He reports he has had neck pain for 14 years and has been advised that surgery is the only option several times per pt report, but he does not want to do that. With regards to evaluation of his neck today, he reports \"I don't want anybody messing with my neck\". As such, we will not be evaluating his neck at this time. With regards to his back, pt does not currently report complaints with regards to back pain or dysfunction.      PLOF: independent with all activity and household chores/yardwork, chronic neck pain  Limitations to PLOF: none  Mechanism of Injury: none  Current symptoms/Complaints: \"legs and arms feel a little weaker\"  Previous Treatment/Compliance: recent PT for LEs, multiple physician referrals for neck pain  PMHx/Surgical Hx: asthma, bladder cancer, hearing impaired, chronic neck pain  Work Hx: see intake  Living Situation: with spouse  Pt Goals: \"I just want to work on getting a little stronger. \"  Barriers: []pain []financial []time []transportation []other  Motivation: appears well motivated and cooperative  Substance use: []Alcohol []Tobacco []other:   FABQ Score: []low []elevate  Cognition: A & O x 4    Other:    OBJECTIVE/EXAMINATION  Domestic Life:   Activity/Recreational Limitations:   Mobility:   Self Care:     32 min [x]Eval                  []Re-Eval     14 min Therapeutic Exercise:  [] See flow sheet : HEP creation and instruction per handout, as well as verbal discussion of gradual exercise progression and information regarding utilizing local community gym and class exercise vs personal training   Rationale: increase strength to improve the patients ability to improve ease of daily activity and activity tolerance. With   [] TE   [] TA   [] neuro   [] other: Patient Education: [x] Review HEP    [] Progressed/Changed HEP based on:   [] positioning   [] body mechanics   [] transfers   [] heat/ice application    [] other:      Other Objective/Functional Measures:    Physical Therapy Evaluation Cervical Spine     SUBJECTIVE  Chief Complaint: see above    Mechanism of injury:    Symptoms  Aggravated by:   [] Bending [] Sitting [] Standing [] Reaching Overhead   [] Moving [] Cough [] Sneeze [] Eating   [] AM  [] PM  Lying:  [] sup   [] pro   [] sidelying   [] Other:     Eased by:    [] Bending [] Sitting [] Standing Lying: [] sup  [] pro  [] sidelying   [] Moving [] AM  [] PM  [] Other:     General Health:  Red Flags Indicated? [] Yes    [x] No  [] Yes [] No Recent weight change (If yes, due to dieting?  [] Yes  [] No)   [] Yes [] No Persistent cough  [] Yes [] No Unremitting pain at night  [] Yes [] No Dizziness  [] Yes [] No Blurred vision  [] Yes [] No Hands more cold or painful in cold weather  [] Yes [] No Ringing in ears  [] Yes [] No Difficulty swallowing  [] Yes [] No Dysfunction of bowel or bladder  [] Yes [] No Recent illness within past 3 weeks (i.e, cold, flu)  [] Yes [] No Jaw pain    Past History/Treatments:    Diagnostic Tests: [] Lab work [] X-rays    [] CT [x] MRI     [] Other:  Results: 9/14/2017 \"IMPRESSION:       Moderate multilevel degenerative changes, detailed at each level above. Central disc protrusion C2-C3, C3-C4. Facet arthropathy and foraminal stenosis at C4-C5, C5-C6. Significant degenerative discogenic disease at C6-C7. \"    Functional Status  Prior level of function:  Present functional limitations:  What position do you sleep in?:    Headaches: Do you have headaches? [] Yes   [x] No  How often do you get headaches? How long does the headache last?  What aggravates it? What relieves it? Does the headache coincide with any other symptoms (visual disturbances, light sensitivity)? Where is the headache? Does it change locations? Other:    OBJECTIVE  Posture: [] WNL  Head Position: forward protruding head posture  Shoulder/Scapular Position: protruded scapular posture  C-Kyphosis:  [] increased   [] decreased   C-Lordosis:   [x] increased   [] decreased  T-Kyphosis:  [x] increased   [] decreased  T-Lordosis:   [] increased   [] decreased     TMJ: [] N/A [] Abnormal - ROM:   Palpation:    Cervical Retraction: [] WNL    [] Abnormal: NT    Shoulder/Scapular Screen: [] WNL    [] Abnormal: WNL    Active Movements: [x] N/A   [] Too acute   [] Other: not assessed formally at pt request, grossly demonstrates [facility] neck ROM, but grossly limited with extension, SB, and Rotation bilaterally with discomfort in most direction in his neck.   ROM % AROM % PROM Comments:pain, area   Forward flexion      Extension      SB right      SB left       Rotation right      Rotation left        Thoracic Spine: [] N/A    [] WNL   [x] Other: significantly structurally kyphotic curvature, not assessed again 2/2 pt above request    PROM:    Palpation: N/A  [] Min  [] Mod  [] Severe    Location:  [] Min  [] Mod  [] Severe    Location:  [] Min  [] Mod  [] Severe    Location:    Neuro Screen (myotome/dematome/felexes): [x] WNL  Myotome Level Muscle Test Myotome Level Muscle Test   C5 Shoulder Adduction - Deltoid C8 Finger Flexors   C6 Wrist Extension T1 Finger Abduction - Interossei   C7 Elbow Extension     Comments: 2+ bilat UE DTRs  Upper Limb Tension Tests: [x] N/A       Ulnar: [] R    [] L    [] +    [] -       Median: [] R    [] L    [] +    [] -       Radial: [] R    [] L    [] +    [] -    Special Tests:  Cervical: NT       Vertebral Artery:  [] R    [] L    [] +    [] -       Alar Ligament: [] R    [] L    [] +    [] -       Transverse Lig: [] R    [] L    [] +    [] -       Spurling's:  [] R    [] L    [] +    [] -       Distraction:  [] R    [] L    [] +    [] -       Compression: [] R    [] L    [] +    [] -    Thoracic Outlet Tests: [x] N/A       Adson's:  [] R    [] L    [] +    [] -       Hyperabduction: [] R    [] L    [] +    [] -       Anirudh's:  [] R    [] L    [] +    [] -       Angela:  [] R    [] L    [] +    [] -    Diaphragmatic Breathing: [] Normal    [] Abnormal    Muscle Flexibility: [x] N/A   Scalenes: [] WNL    [] Tight    [] R    [] L   Upper Trap: [] WNL    [] Tight    [] R    [] L   Levator: [] WNL    [] Tight    [] R    [] L   Pect. Minor: [] WNL    [] Tight    [] R    [] L    Global Muscular Weakness: [x] N/A   Lower Trap:   Rhomboids:   Middle Trap:   Serratus Ant:   Ext Rotators: Other:    Other tests/comments:  5x sit to stand: 9 seconds  TUG 10.8 seconds     BUE MMTs grossly 4+/5 or better  LE MMTs: hips flex 4+/5 bilat, abd and ext 3/5 bilat  Knees 5/5 ext bilat, 4/5 flex bilat  Ankles 5/5 bilat  Normal gait pattern  Reciprocal stair negotiation without compensation    Pain Level (0-10 scale) post treatment: 1    ASSESSMENT/Changes in Function: Per POC.      [x]  See Plan of Care  []  See progress note/recertification  []  See Discharge Summary         Progress towards goals / Updated goals:  Per POC. PLAN  []  Upgrade activities as tolerated     []  Continue plan of care  []  Update interventions per flow sheet       [x]  Discharge due to:_see below. [x]  Other:_Pt declined cervical assessment, and reports back pain not a concern. Reports of weakness not objectively manifesting significantly. Educated pt on entry level general strengthening exercises and advised referral to gym to progress further. DC from PT.       Ho Cleveland, PT, DPT, ATC 5/15/2018  10:36 AM

## 2018-09-06 ENCOUNTER — HOSPITAL ENCOUNTER (OUTPATIENT)
Dept: LAB | Age: 83
Discharge: HOME | End: 2018-09-06
Payer: MEDICARE

## 2018-09-06 ENCOUNTER — OFFICE VISIT (OUTPATIENT)
Dept: UROLOGY | Age: 83
End: 2018-09-06

## 2018-09-06 VITALS
TEMPERATURE: 97.5 F | DIASTOLIC BLOOD PRESSURE: 70 MMHG | HEART RATE: 78 BPM | HEIGHT: 66 IN | WEIGHT: 132 LBS | SYSTOLIC BLOOD PRESSURE: 110 MMHG | OXYGEN SATURATION: 98 % | BODY MASS INDEX: 21.21 KG/M2

## 2018-09-06 DIAGNOSIS — N40.0 BENIGN PROSTATIC HYPERPLASIA, UNSPECIFIED WHETHER LOWER URINARY TRACT SYMPTOMS PRESENT: ICD-10-CM

## 2018-09-06 DIAGNOSIS — Z85.51 HISTORY OF BLADDER CANCER: Primary | ICD-10-CM

## 2018-09-06 DIAGNOSIS — N32.0 BLADDER NECK CONTRACTURE: ICD-10-CM

## 2018-09-06 LAB
BILIRUB UR QL STRIP: NEGATIVE
GLUCOSE UR-MCNC: NEGATIVE MG/DL
KETONES P FAST UR STRIP-MCNC: NEGATIVE MG/DL
PH UR STRIP: 5.5 [PH] (ref 4.6–8)
PROT UR QL STRIP: NEGATIVE
SP GR UR STRIP: 1.02 (ref 1–1.03)
UA UROBILINOGEN AMB POC: NORMAL (ref 0.2–1)
URINALYSIS CLARITY POC: CLEAR
URINALYSIS COLOR POC: YELLOW
URINE BLOOD POC: NEGATIVE
URINE LEUKOCYTES POC: NEGATIVE
URINE NITRITES POC: NEGATIVE

## 2018-09-06 PROCEDURE — 84153 ASSAY OF PSA TOTAL: CPT | Performed by: UROLOGY

## 2018-09-06 RX ORDER — EPLERENONE 25 MG/1
TABLET, FILM COATED ORAL DAILY
COMMUNITY
End: 2020-01-01 | Stop reason: ALTCHOICE

## 2018-09-06 RX ORDER — OLMESARTAN MEDOXOMIL AND HYDROCHLOROTHIAZIDE 40/12.5 40; 12.5 MG/1; MG/1
1 TABLET ORAL DAILY
COMMUNITY
End: 2020-01-01 | Stop reason: ALTCHOICE

## 2018-09-06 RX ORDER — CIPROFLOXACIN 500 MG/1
500 TABLET ORAL 2 TIMES DAILY
Qty: 6 TAB | Refills: 0 | Status: SHIPPED | OUTPATIENT
Start: 2018-09-06 | End: 2018-09-09

## 2018-09-06 NOTE — PROGRESS NOTES
[facility]  OFFICE PROCEDURE PROGRESS NOTE        Chart reviewed for the following:   Adeline GARCIA LPN, have reviewed the History, Physical and updated the Allergic reactions for [address] [place] [address] 21 performed immediately prior to start of procedure:   Tyson Howard LPN, have performed the following reviews on Tianna Naidu prior to the start of the procedure:            * Patient was identified by name and date of birth   * Agreement on procedure being performed was verified  * Risks and Benefits explained to the patient  * Procedure site verified and marked as necessary  * Patient was positioned for comfort  * Consent was signed and verified     Time: 2:56PM      Date of procedure: 9/6/2018    Procedure performed by:  Jone Garcia MD    Provider assisted by: Yuriy Gant LPN    Patient assisted by: self    How tolerated by patient: tolerated the procedure well with no complications    Post Procedural Pain Scale: 0 - No Hurt    Comments: none    Patient verbalized understanding of procedure and post procedure instructions.

## 2018-09-06 NOTE — PATIENT INSTRUCTIONS
Bladder Cancer: Care Instructions  Your Care Instructions    Bladder cancer occurs when abnormal cells grow out of control in the bladder. It usually can be cured when it is found early. It is more common in older people. Treatment may include surgery to remove part of the bladder. If the tumor is large, the entire bladder may be removed. You may also have radiation or chemotherapy to kill the cancer cells. Sometimes people get treatment with medicines that help the body's natural defenses, or immune system, fight the cancer. Finding out that you have cancer is scary. You may feel many emotions and may need some help coping. Seek out family, friends, and counselors for support. You also can do things at home to make yourself feel better while you go through treatment. Call the [employer] (5-916.608.3456) or visit its website at [employer] reviews for more information. Follow-up care is a key part of your treatment and safety. Be sure to make and go to all appointments, and call your doctor if you are having problems. It's also a good idea to know your test results and keep a list of the medicines you take. How can you care for yourself at home? · Take your medicines exactly as prescribed. Call your doctor if you think you are having a problem with your medicine. You may get medicine for nausea and vomiting if you have these side effects. · Eat healthy food. If you are not hungry, try to eat food that has protein and extra calories to keep up your strength and prevent weight loss. Drink liquid meal replacements for extra calories and protein. Try to eat your main meal early. · Get some physical activity every day, but do not get too tired. Keep doing the hobbies you enjoy as your energy allows. · Take steps to control your stress and workload. Learn relaxation techniques. ¨ Share your feelings. Stress and tension affect our emotions.  By expressing your feelings to others, you may be able to understand and cope with them. ¨ Consider joining a support group. Talking about a problem with your spouse, a good friend, or other people with similar problems is a good way to reduce tension and stress. ¨ Express yourself through art. Try writing, dance, art, or crafts to relieve tension. Some dance, writing, or art groups may be available just for people who have cancer. ¨ Be kind to your body and mind. Getting enough sleep, eating a healthy diet, and taking time to do things you enjoy can contribute to an overall feeling of balance in your life and help reduce stress. ¨ Get help if you need it. Discuss your concerns with your doctor or counselor. · If you are vomiting or have diarrhea:  ¨ Drink plenty of fluids (enough so that your urine is light yellow or clear like water) to prevent dehydration. Choose water and other caffeine-free clear liquids. If you have kidney, heart, or liver disease and have to limit fluids, talk with your doctor before you increase the amount of fluids you drink. ¨ When you are able to eat, try clear soups, mild foods, and liquids until all symptoms are gone for 12 to 48 hours. Other good choices include dry toast, crackers, cooked cereal, and gelatin dessert, such as Jell-O.  · Take care of your urinary tract to prevent problems such as infection, which can be caused by bladder cancer and its treatment. Limit drinks with caffeine, drink plenty of fluids, and urinate every 3 to 4 hours. · If you have not already done so, prepare a list of advance directives. Advance directives are instructions to your doctor and family members about what kind of care you want if you become unable to speak for yourself. When should you call for help? Call your doctor now or seek immediate medical care if:    · You have pain that does not get better after taking pain medicine.     · You have symptoms of a kidney infection. These may include:  ¨ Pain or burning when you urinate.   ¨ A frequent need to urinate without being able to pass much urine. ¨ Pain in the flank, which is just below the rib cage and above the waist on either side of the back. ¨ Blood in your urine. ¨ A fever.    Watch closely for changes in your health, and be sure to contact your doctor if:    · You do not get better as expected. Where can you learn more? Go to http://mary lou-bailey.info/. Enter M796 in the search box to learn more about \"Bladder Cancer: Care Instructions. \"  Current as of: May 12, 2017  Content Version: 11.7  © 5595-3619 [employer]. Care instructions adapted under license by [employer] (which disclaims liability or warranty for this information). If you have questions about a medical condition or this instruction, always ask your healthcare professional. [address] any warranty or liability for your use of this information.

## 2018-09-06 NOTE — PROGRESS NOTES
Angel Perez 80 y.o. male     Mr. 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  Also followed for symptomatic BPH status post TURP in the 1980s and again in the 1990s  Patient complains of leaking urine staining underwear 4 times each week not associated with urgency, frequency, suprapubic or flank pain not associated with Valsalva physical activity  No dysuria-no visual changes-no neurologic symptoms    Interval history-80year-old spouse with metastatic colon carcinoma status post chemotherapy      History of TURP currently being urinary retention 1986    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      PSA 0.6 in August 2016      Negative surveillance cystoscopy in September 2016-bladder findings consistent with bladder outlet obstruction from BPH          Review of Systems:   CNS: No seizures syncope headaches or dizziness no visual changes  Respiratory: No wheezing no shortness of breath  Cardiovascular: Hypertension  Intestinal: No dyspepsia diarrhea or constipation  Urinary: Symptomatic BPH status post TURP 1986/  Skeletal: Muscle aches and pains chronic low back pain/large joint arthritis  Endocrine: No diabetes or thyroid disease  Other:        Allergies: Allergies   Allergen Reactions    Aspirin Other (comments)     Upset stomach        Medications:    Current Outpatient Prescriptions   Medication Sig Dispense Refill    eplerenone (INSPRA) 25 mg tablet Take  by mouth daily.  olmesartan-hydroCHLOROthiazide (BENICAR HCT) 40-12.5 mg per tablet Take 1 Tab by mouth daily.  CRESTOR 20 mg tablet       meloxicam (MOBIC) 15 mg tablet Take 1 Tab by mouth daily.  30 Tab 0    erythromycin (ILOTYCIN) ophthalmic ointment       fluticasone (FLONASE) 50 mcg/actuation nasal spray       KLOR-CON 10 10 mEq tablet       valsartan-hydrochlorothiazide (DIOVAN-HCT) 320-12.5 mg per tablet       gabapentin (NEURONTIN) 100 mg capsule       MULTIVIT-MINERALS/FOLIC ACID (ADULT MULTIVITAMIN GUMMIES PO) Take  by mouth. Past Medical History:   Diagnosis Date    Bladder cancer ([facility] [address]     Hypertension       Past Surgical History:   Procedure Laterality Date    HX CAROTID ENDARTERECTOMY      HX ROTATOR CUFF REPAIR Right      Social History     Social History    Marital status:      Spouse name: N/A    Number of children: N/A    Years of education: N/A     Occupational History    Not on file. Social History Main Topics    Smoking status: Never Smoker    Smokeless tobacco: Never Used    Alcohol use No    Drug use: No    Sexual activity: No     Other Topics Concern    Not on file     Social History Narrative      History reviewed. No pertinent family history. Physical Examination: Well-nourished mature male in no apparent distress spry and physically active-no limitations    Prostate by CIARAN is smooth rounded benign consistency nontender-no induration no nodularity  No rectal masses induration or tenderness    Urinalysis: Negative dipstick/nitrite negative/heme-negative    Cystoscopy Report: After prepping the glans penis with Betadine solution and instilling 2% lidocaine jelly intraurethrally a flexible cystoscope was passed through the urethra into the bladder revealing normal urothelium throughout. There are no stones, tumors, or diverticuli. 30 Greek wide caliber bladder neck contracture-ureteral orifices are both slitlike in appearance with clear urine jets observed from both sides.   There are no blood vessel lesions no glomerulations no hemorrhages no injection or friability evident well excavated prostatic channel-procedure was uncomplicated well-tolerated  EBL-none  Specimen-none    Impression: Symptomatic BPH responding favorably to TURP Bladder carcinoma FRANNIE ×5 years status post BCG Rx                         Bladder neck contracture    Plan: PSA today    Cipro 500 mg twice daily ×3 days    RTC 1 year for surveillance cystoscopy/CIARAN/PSA      More than 1/2 of this 25 minute visit was spent in counselling and coordination of care, as described above. Karl Silva MD  -electronically signed-    PLEASE NOTE:  This document has been produced using voice recognition software. Unrecognized errors in transcription may be present.

## 2018-09-06 NOTE — PROGRESS NOTES
Mr. Marilyn Penaloza has a reminder for a \"due or due soon\" health maintenance. I have asked that he contact his primary care provider for follow-up on this health maintenance.

## 2018-09-06 NOTE — MR AVS SNAPSHOT
[facility] [address] [address] 
[phone] Patient: Kelsey Peralta MRN: [id number] VGJ:[id number] Visit Information Date & Time Provider Department Dept. Phone Encounter #  
 9/6/2018  2:00 PM [facility] [place] [facility] [phone] [id number] Follow-up Instructions Return in about 1 year (around 9/6/2019) for PSA CIARAN cystoscopy. Follow-up and Disposition History Your Appointments 10/22/2018  1:45 PM  
Follow Up with Sylvia Solitario MD  
[facility] ([address] [place] [address]) Appt Note: Neck to shoulder pain/ last seen matthews 2017/ pt req [place]. [address] [facility] [address]  
[phone]  
  
   
 [address] [facility] [address] 9/12/2019  2:00 PM  
PROCEDURE with Maria Fernanda Culver MD  
[facility] [address] [place] [address]) Appt Note: yearly cysto/PVR  
 [address] [address]  
[phone] [address] [facility] [address] Upcoming Health Maintenance Date Due DTaP/Tdap/Td series (1 - Tdap) 6/4/1950 ZOSTER VACCINE AGE 60> 4/4/1989 GLAUCOMA SCREENING Q2Y 6/4/1994 Pneumococcal 65+ Low/Medium Risk (1 of 2 - PCV13) 6/4/1994 MEDICARE YEARLY EXAM 3/15/2018 Influenza Age 5 to Adult 8/1/2018 Allergies as of 9/6/2018  Review Complete On: 9/6/2018 By: Maria Fernanda Culver MD  
  
 Severity Noted Reaction Type Reactions Aspirin  08/25/2016    Other (comments) Upset stomach Current Immunizations  Never Reviewed No immunizations on file. Not reviewed this visit You Were Diagnosed With   
  
 Codes Comments History of bladder cancer    -  Primary ICD-10-CM: Z85.51 
ICD-9-CM: V10.51 Benign prostatic hyperplasia, unspecified whether lower urinary tract symptoms present     ICD-10-CM: N40.0 ICD-9-CM: 600.00 Bladder neck contracture     ICD-10-CM: N32.0 ICD-9-CM: 596.0 Vitals BP Pulse Temp Height(growth percentile) Weight(growth percentile) SpO2  
 110/70 (BP 1 Location: Left arm, BP Patient Position: Sitting) 78 97.5 °F (36.4 °C) (Oral) 5' 6\" (1.676 m) 132 lb (59.9 kg) 98% BMI Smoking Status 21.31 kg/m2 Never Smoker Vitals History BMI and BSA Data Body Mass Index Body Surface Area  
 21.31 kg/m 2 1.67 m 2 Preferred Pharmacy Pharmacy Name Phone [address] [facility], [address] [place] [address] [phone] Your Updated Medication List  
  
   
This list is accurate as of 9/6/18  3:47 PM.  Always use your most recent med list.  
  
  
  
  
 ADULT MULTIVITAMIN GUMMIES PO Take  by mouth. ciprofloxacin HCl 500 mg tablet Commonly known as:  CIPRO Take 1 Tab by mouth two (2) times a day for 3 days. CRESTOR 20 mg tablet Generic drug:  rosuvastatin  
  
 eplerenone 25 mg tablet Commonly known as:  [place] Edson Take  by mouth daily. erythromycin ophthalmic ointment Commonly known as:  ILOTYCIN  
  
 fluticasone 50 mcg/actuation nasal spray Commonly known as:  FLONASE  
  
 gabapentin 100 mg capsule Commonly known as:  NEURONTIN  
  
 KLOR-CON 10 10 mEq tablet Generic drug:  potassium chloride SR  
  
 meloxicam 15 mg tablet Commonly known as:  MOBIC Take 1 Tab by mouth daily. olmesartan-hydroCHLOROthiazide 40-12.5 mg per tablet Commonly known as:  BENICAR HCT Take 1 Tab by mouth daily. valsartan-hydroCHLOROthiazide 320-12.5 mg per tablet Commonly known as:  DIOVAN-HCT Prescriptions Sent to Pharmacy Refills  
 ciprofloxacin HCl (CIPRO) 500 mg tablet 0 Sig: Take 1 Tab by mouth two (2) times a day for 3 days. Class: Normal  
 Pharmacy: [address] Ph #: [phone]  Route: Oral  
  
 We Performed the Following AMB POC URINALYSIS DIP STICK AUTO W/O MICRO [55429 CPT(R)] COLLECTION VENOUS BLOOD,VENIPUNCTURE [id number] CPT(R)] CYSTOURETHROSCOPY [32398 CPT(R)] Follow-up Instructions Return in about 1 year (around 9/6/2019) for PSA CIARAN cystoscopy. Patient Instructions Bladder Cancer: Care Instructions Your Care Instructions Bladder cancer occurs when abnormal cells grow out of control in the bladder. It usually can be cured when it is found early. It is more common in older people. Treatment may include surgery to remove part of the bladder. If the tumor is large, the entire bladder may be removed. You may also have radiation or chemotherapy to kill the cancer cells. Sometimes people get treatment with medicines that help the body's natural defenses, or immune system, fight the cancer. Finding out that you have cancer is scary. You may feel many emotions and may need some help coping. Seek out family, friends, and counselors for support. You also can do things at home to make yourself feel better while you go through treatment. Call the [employer] (9-505.455.4585) or visit its website at [address] [employer] for more information. Follow-up care is a key part of your treatment and safety. Be sure to make and go to all appointments, and call your doctor if you are having problems. It's also a good idea to know your test results and keep a list of the medicines you take. How can you care for yourself at home? · Take your medicines exactly as prescribed. Call your doctor if you think you are having a problem with your medicine. You may get medicine for nausea and vomiting if you have these side effects. · Eat healthy food. If you are not hungry, try to eat food that has protein and extra calories to keep up your strength and prevent weight loss. Drink liquid meal replacements for extra calories and protein. Try to eat your main meal early. · Get some physical activity every day, but do not get too tired. Keep doing the hobbies you enjoy as your energy allows. · Take steps to control your stress and workload. Learn relaxation techniques. ¨ Share your feelings. Stress and tension affect our emotions. By expressing your feelings to others, you may be able to understand and cope with them. ¨ Consider joining a support group. Talking about a problem with your spouse, a good friend, or other people with similar problems is a good way to reduce tension and stress. ¨ Express yourself through art. Try writing, dance, art, or crafts to relieve tension. Some dance, writing, or art groups may be available just for people who have cancer. ¨ Be kind to your body and mind. Getting enough sleep, eating a healthy diet, and taking time to do things you enjoy can contribute to an overall feeling of balance in your life and help reduce stress. ¨ Get help if you need it. Discuss your concerns with your doctor or counselor. · If you are vomiting or have diarrhea: ¨ Drink plenty of fluids (enough so that your urine is light yellow or clear like water) to prevent dehydration. Choose water and other caffeine-free clear liquids. If you have kidney, heart, or liver disease and have to limit fluids, talk with your doctor before you increase the amount of fluids you drink. ¨ When you are able to eat, try clear soups, mild foods, and liquids until all symptoms are gone for 12 to 48 hours. Other good choices include dry toast, crackers, cooked cereal, and gelatin dessert, such as Jell-O. 
· Take care of your urinary tract to prevent problems such as infection, which can be caused by bladder cancer and its treatment. Limit drinks with caffeine, drink plenty of fluids, and urinate every 3 to 4 hours. · If you have not already done so, prepare a list of advance directives.  Advance directives are instructions to your doctor and family members about what kind of care you want if you become unable to speak for yourself. When should you call for help? Call your doctor now or seek immediate medical care if: 
  · You have pain that does not get better after taking pain medicine.  
  · You have symptoms of a kidney infection. These may include: 
¨ Pain or burning when you urinate. ¨ A frequent need to urinate without being able to pass much urine. ¨ Pain in the flank, which is just below the rib cage and above the waist on either side of the back. ¨ Blood in your urine. ¨ A fever.  
 Watch closely for changes in your health, and be sure to contact your doctor if: 
  · You do not get better as expected. Where can you learn more? Go to http://mary lou-bailey.info/. Enter M796 in the search box to learn more about \"Bladder Cancer: Care Instructions. \" Current as of: May 12, 2017 Content Version: 11.7 © 5084-6709 [employer]. Care instructions adapted under license by [employer] (which disclaims liability or warranty for this information). If you have questions about a medical condition or this instruction, always ask your healthcare professional. [place] [address] any warranty or liability for your use of this information. Patient Instructions History Introducing hospitals & HEALTH SERVICES! [facility] introduces Phantom patient portal. Now you can access parts of your medical record, email your doctor's office, and request medication refills online. 1. In your internet browser, go to [email or web address] 2. Click on the First Time User? Click Here link in the Sign In box. You will see the New Member Sign Up page. 3. Enter your Phantom Access Code exactly as it appears below. You will not need to use this code after youve completed the sign-up process. If you do not sign up before the expiration date, you must request a new code. · [employer] Access Code: [email or web address] Expires: 12/5/2018  2:51 PM 
 
4. Enter the last four digits of your Social Security Number (xxxx) and Date of Birth (mm/dd/yyyy) as indicated and click Submit. You will be taken to the next sign-up page. 5. Create a [employer] ID. This will be your [employer] login ID and cannot be changed, so think of one that is secure and easy to remember. 6. Create a [employer] password. You can change your password at any time. 7. Enter your Password Reset Question and Answer. This can be used at a later time if you forget your password. 8. Enter your e-mail address. You will receive e-mail notification when new information is available in [address] 9. Click Sign Up. You can now view and download portions of your medical record. 10. Click the Download Summary menu link to download a portable copy of your medical information. If you have questions, please visit the Frequently Asked Questions section of the [employer] website. Remember, [employer] is NOT to be used for urgent needs. For medical emergencies, dial 911. Now available from your iPhone and Android! Please provide this summary of care documentation to your next provider. Your primary care clinician is listed as Master Johnson. If you have any questions after today's visit, please call [phone].

## 2018-09-07 LAB — PSA SERPL-MCNC: 0.3 NG/ML (ref 0–4)

## 2018-10-22 ENCOUNTER — OFFICE VISIT (OUTPATIENT)
Dept: ORTHOPEDIC SURGERY | Age: 83
End: 2018-10-22

## 2018-10-22 VITALS
TEMPERATURE: 97.7 F | SYSTOLIC BLOOD PRESSURE: 139 MMHG | BODY MASS INDEX: 21.18 KG/M2 | WEIGHT: 131.8 LBS | OXYGEN SATURATION: 98 % | RESPIRATION RATE: 16 BRPM | DIASTOLIC BLOOD PRESSURE: 74 MMHG | HEIGHT: 66 IN | HEART RATE: 72 BPM

## 2018-10-22 DIAGNOSIS — M54.50 NONSPECIFIC PAIN IN THE LUMBAR REGION: ICD-10-CM

## 2018-10-22 DIAGNOSIS — M47.816 LUMBAR SPONDYLOSIS: ICD-10-CM

## 2018-10-22 DIAGNOSIS — M54.2 NONSPECIFIC PAIN IN THE NECK REGION: ICD-10-CM

## 2018-10-22 DIAGNOSIS — M50.30 DDD (DEGENERATIVE DISC DISEASE), CERVICAL: Primary | ICD-10-CM

## 2018-10-22 RX ORDER — DICLOFENAC SODIUM 10 MG/G
GEL TOPICAL 4 TIMES DAILY
Qty: 200 G | Refills: 2 | Status: SHIPPED | OUTPATIENT
Start: 2018-10-22 | End: 2020-01-01 | Stop reason: ALTCHOICE

## 2018-10-22 NOTE — PROGRESS NOTES
[address] 
[address] Phone: [phone] Fax: [phone] Antonio Mackenzie : 1929 PCP: Gregor Jefferson MD 
 
PROGRESS NOTE ASSESSMENT AND PLAN Diagnoses and all orders for this visit: 1. DDD (degenerative disc disease), cervical 
-     REFERRAL TO PHYSICAL THERAPY 
-     diclofenac (VOLTAREN) 1 % gel; Apply  to affected area four (4) times daily. 2. Lumbar spondylosis -     REFERRAL TO PHYSICAL THERAPY 
-     diclofenac (VOLTAREN) 1 % gel; Apply  to affected area four (4) times daily. 3. Nonspecific pain in the neck region -     AMB POC XRAY, SPINE, CERVICAL; 2 OR 3 
 
4. Nonspecific pain in the lumbar region -     AMB POC XRAY, SPINE, LUMBOSACRAL; 4+ 1. Advised to continue HEP. 2. No indications for surgery or injections at this time. 3. Referral to PT 4. Trial of Voltaren gel 5. Given information on DDD Follow-up Disposition: 
Return if symptoms worsen or fail to improve. HISTORY OF PRESENT ILLNESS Sangita Warner is a 80 y.o. male. RHD. Pt presents to the office for neck and low back pain. Pt states he walks slow and walking aggravates his pain. He reports trying to walk 3 times a week. He reports a short standing tolerance due to 'everything tightening up'. He states mornings are rough. He indicates that right now he has no pain. He reports going to the chiropractor, but preventing them from doing things to his neck. He c/o cramps in his R calf occasionally. Pt affirms good balance and no falls. Pt reports giving up exercising at the gym because it made him sore. He complains that a warm shower tightens him up instead of easing his muscles. Location of pain: neck, low back Does pain radiate into extremities: R shoulder down R arm. RUE weakness and tingling/numbness for the past year. R hip into R leg at times. Does patient have weakness: B/L hands. Pt denies saddle paresthesias. Medications pt is on: Tylenol Arthritis PRN. Aspercreams and Salonpas patches with relief. Heat and ice with benefit. Pt denies recent ED visits or hospitalizations. Denies persistent fevers, chills, weight changes, neurogenic bowel or bladder symptoms. Treatments patient has tried: 
Physical therapy:Yes with benefit Doing HEP: No. Previously with soreness Non-opioid medications: Yes Failed Gabapentin Spinal injections: Yes B/L C4-5-6 MBB 10/17 with no benefit Spinal surgery- No.  
 
 
 reviewed. PMHx bladder cancer 2014. Pt denies hx of Parkinson's. Pain Assessment  9/29/2017 Location of Pain - Location Modifiers - Severity of Pain 4 Quality of Pain (No Data) Quality of Pain Comment stiffness Frequency of Pain - Aggravating Factors Standing Limiting Behavior -  
Relieving Factors Exercises Relieving Factors Comment swimming Result of Injury - MRI Results (most recent): 
Results from Hospital Encounter encounter on 09/15/17 MRI CERV SPINE WO CONT Narrative EXAM:  MRI CERV SPINE WO CONT INDICATION:   cervical radiculopathy, decreased cervical ROM 
 
COMPARISON: None. TECHNIQUE: MR imaging of the cervical spine was performed including sagittal T1, 
T2, STIR;  axial GRE, T2, T1. Contrast was not administered. FINDINGS: 
Grade 1 anterior listhesis of C4 on C5, C5 on C6 and slight anterior listhesis 
of C6 on C7. Moderate degenerative discogenic disease C5-C6 and C6-C7. 1 cm 
hemangioma of T1. Mild endplate edema at P4-J0. Vertebral body heights are 
maintained. .  The craniocervical junction is intact. The course, caliber, and 
signal intensity of the spinal cord are normal.   
 
C2/3:  Mild central disc protrusion. Mild facet arthropathy. No significant 
central canal or foraminal stenosis. C3/4:  Mild central disc protrusion. Mild/moderate facet arthropathy. Mild/moderate left foraminal stenosis. Mild narrowing of the central canal. 
 
C4/5:  Mild disc osteophyte complex. Severe right and mild left facet 
arthropathy. Moderate right and mild left foraminal stenosis. Central canal is 
patent. C5/6:  Disc osteophyte complex. Mild/moderate facet arthropathy. Mild/moderate 
foraminal stenosis. Mild central canal stenosis. C6/7:  Disc osteophyte complex. Mild facet arthropathy. Mild foraminal stenosis. Central canal is patent. C7/T1:  No significant central canal or foraminal stenosis. Impression IMPRESSION:   
 
Moderate multilevel degenerative changes, detailed at each level above. Central disc protrusion C2-C3, C3-C4. Facet arthropathy and foraminal stenosis at C4-C5, C5-C6. Significant degenerative discogenic disease at C6-C7. PAST MEDICAL HISTORY Past Medical History:  
Diagnosis Date  Bladder cancer ([facility] [address] 2014  Hypertension Past Surgical History:  
Procedure Laterality Date  HX CAROTID ENDARTERECTOMY  HX ROTATOR CUFF REPAIR Right Emeka Perdomo MEDICATIONS Current Outpatient Medications Medication Sig Dispense Refill  acetaminophen (TYLENOL PO) Take  by mouth.  diclofenac (VOLTAREN) 1 % gel Apply  to affected area four (4) times daily. 200 g 2  
 eplerenone (INSPRA) 25 mg tablet Take  by mouth daily.  erythromycin (ILOTYCIN) ophthalmic ointment  fluticasone (FLONASE) 50 mcg/actuation nasal spray  KLOR-CON 10 10 mEq tablet  CRESTOR 20 mg tablet  MULTIVIT-MINERALS/FOLIC ACID (ADULT MULTIVITAMIN GUMMIES PO) Take  by mouth.  olmesartan-hydroCHLOROthiazide (BENICAR HCT) 40-12.5 mg per tablet Take 1 Tab by mouth daily.  meloxicam (MOBIC) 15 mg tablet Take 1 Tab by mouth daily. 30 Tab 0  
 valsartan-hydrochlorothiazide (DIOVAN-HCT) 320-12.5 mg per tablet  gabapentin (NEURONTIN) 100 mg capsule ALLERGIES Allergies Allergen Reactions  Aspirin Other (comments) Upset stomach SOCIAL HISTORY Social History Socioeconomic History  Marital status:  Spouse name: Not on file  Number of children: Not on file  Years of education: Not on file  Highest education level: Not on file Social Needs  Financial resource strain: Not on file  Food insecurity - worry: Not on file  Food insecurity - inability: Not on file  Transportation needs - medical: Not on file  Transportation needs - non-medical: Not on file Occupational History  Not on file Tobacco Use  Smoking status: Never Smoker  Smokeless tobacco: Never Used Substance and Sexual Activity  Alcohol use: No  
 Drug use: No  
 Sexual activity: No  
Other Topics Concern  Not on file Social History Narrative  Not on file FAMILY HISTORY History reviewed. No pertinent family history. REVIEW OF SYSTEMS Review of Systems Constitutional: Negative for chills, fever and weight loss. Respiratory: Negative for shortness of breath. Cardiovascular: Negative for chest pain. Gastrointestinal: Negative for constipation. Negative for fecal incontinence Genitourinary: Negative for dysuria. Negative for urinary incontinence Musculoskeletal:  
     Per HPI Skin: Negative for rash. Neurological: Positive for tingling and focal weakness. Negative for dizziness, tremors and headaches. Endo/Heme/Allergies: Does not bruise/bleed easily. Psychiatric/Behavioral: The patient does not have insomnia. PHYSICAL EXAMINATION Visit Vitals /74 Pulse 72 Temp 97.7 °F (36.5 °C) Resp 16 Ht 5' 6\" (1.676 m) Wt 131 lb 12.8 oz (59.8 kg) SpO2 98% BMI 21.27 kg/m² Accompanied by self. Constitutional:  Well developed, well nourished, in no acute distress. Psychiatric: Affect and mood are appropriate. Integumentary: No rashes or abrasions noted on exposed areas. Cardiovascular/Peripheral Vascular: Intact l pulses. No peripheral edema is noted. Lymphatic:  No evidence of lymphedema. No cervical lymphadenopathy. SPINE/MUSCULOSKELETAL EXAM 
 
Cervical spine: 
Neck is midline. Normal muscle tone. No focal atrophy is noted. Tenderness to palpation none. Negative Spurling's sign. Negative Tinel's sign. Negative Phan's sign. Lumbar spine: No rash, ecchymosis, or gross obliquity. No fasciculations. No focal atrophy is noted. Tenderness to palpation R L4-5. No tenderness to palpation at the sciatic notch. SI joints non-tender. Trochanters non tender. MOTOR:   
  Biceps  Triceps Deltoids Wrist Ext Wrist Flex Hand Intrin Right 4/5 4/5 4/5 4/5 4/5 4/5 Left 4/5 4/5 4/5 4/5 4/5 4/5 Hip Flex Quads Hamstrings Ankle DF EHL Ankle PF Right 4/5 4/5 4/5 4/5 4/5 4/5 Left 4/5 4/5 4/5 4/5 4/5 4/5 SLR negative. Hamstring tightness on R. Ambulation without assistive device. Shuffling gait. FWB. RADIOGRAPHS Cervical spine xray films reviewed: 
1) advanced degenerative changes C5-6-7 Lumbar spine xray films reviewed: 
1) mild scoliosis 2) facet changes at L5-S1 3) no instability Written by Joanne Owens, as dictated by Dawson Yoder MD. 
 
I, Dr. Dawson Yoder MD, confirm that all documentation is accurate. Mr. Geeta Cutler may have a reminder for a \"due or due soon\" health maintenance. I have asked that he contact his primary care provider for follow-up on this health maintenance.

## 2018-10-22 NOTE — PATIENT INSTRUCTIONS
Cervical Disc Disease: Care Instructions Your Care Instructions Cervical disc disease results from damage, disease, or wear and tear to the discs between the bones (vertebra) in your neck. The discs act as shock absorbers for the spine and keep the spine flexible. When a disc is damaged, it can bulge out and press against the nerve roots or spinal cord. This is sometimes called a herniated or \"slipped disc. \" This pressure can cause pain and numbness or tingling in your arms and hands. It can also cause weakness in your legs. An accident can damage a disc and cause it to break open (rupture). Aging and hard physical work can also cause damage to cervical discs. The first treatments for cervical disc disease include physical therapy, special neck exercises, heat, and pain medicine. If these fail, your doctor may inject steroids and pain medicine into your neck. Surgery is usually done only if other treatments have not worked. Follow-up care is a key part of your treatment and safety. Be sure to make and go to all appointments, and call your doctor if you are having problems. It's also a good idea to know your test results and keep a list of the medicines you take. How can you care for yourself at home? · Take pain medicines exactly as directed. ? If the doctor gave you a prescription medicine for pain, take it as prescribed. ? If you are not taking a prescription pain medicine, ask your doctor if you can take an over-the-counter medicine. · Don't spend too long in one position. Take short breaks to move around and change positions. · Wear a seat belt and shoulder harness when you are in a car. · Sleep with a pillow under your head and neck that keeps your neck straight. · Follow your doctor's instructions for gentle neck-stretching exercises. · Do not smoke. Smoking can slow healing of your discs. If you need help quitting, talk to your doctor about stop-smoking programs and medicines. These can increase your chances of quitting for good. · Avoid strenuous work or exercise until your doctor says it is okay. When should you call for help? Call 911 anytime you think you may need emergency care. For example, call if: 
  · You are unable to move an arm or a leg at all.  
[facility] your doctor now or seek immediate medical care if: 
  · You have new or worse symptoms in your arms, legs, belly, or buttocks. Symptoms may include: 
? Numbness or tingling. ? Weakness. ? Pain.  
  · You lose bladder or bowel control.  
 Watch closely for changes in your health, and be sure to contact your doctor if: 
  · You do not get better as expected. Where can you learn more? Go to http://mary lou-bailey.info/. Enter N118 in the search box to learn more about \"Cervical Disc Disease: Care Instructions. \" Current as of: November 29, 2017 Content Version: 11.8 © 9246-8836 [employer]. Care instructions adapted under license by [employer] (which disclaims liability or warranty for this information). If you have questions about a medical condition or this instruction, always ask your healthcare professional. [address] any warranty or liability for your use of this information.

## 2018-10-22 NOTE — PROGRESS NOTES
Verbal order entered per Dr. Thea Scott as documented on blue sheet: Referral to Physical Therapy. Voltaren gel QID to affected areas 200grams with 2 refills.

## 2018-10-26 ENCOUNTER — HOSPITAL ENCOUNTER (OUTPATIENT)
Dept: PHYSICAL THERAPY | Age: 83
Discharge: HOME | End: 2018-10-26
Payer: MEDICARE

## 2018-10-26 PROCEDURE — 97162 PT EVAL MOD COMPLEX 30 MIN: CPT

## 2018-10-26 PROCEDURE — 97140 MANUAL THERAPY 1/> REGIONS: CPT

## 2018-10-26 PROCEDURE — G8982 BODY POS GOAL STATUS: HCPCS

## 2018-10-26 PROCEDURE — G8981 BODY POS CURRENT STATUS: HCPCS

## 2018-10-26 PROCEDURE — 97110 THERAPEUTIC EXERCISES: CPT

## 2018-10-26 NOTE — PROGRESS NOTES
In [address]  Karen P[address] [place], [address] 
[phone] fax Plan of Care/ Statement of Necessity for Physical Therapy Services Patient name: Shyann Baez Start of Care: 10/26/2018 Referral source: Terrence Christopher MD : 1929 Medical Diagnosis: Cervicalgia [M54.2] Low back pain [M54.5] Other cervical disc degeneration, unspecified cervical region [M50.30] Spondylosis without myelopathy or radiculopathy, lumbar region [M47.816] Onset Date:chronic >10 years ago Treatment Diagnosis: Cervical and lumbar pain Prior Hospitalization: see medical history Provider#: [id number] Medications: Verified on Patient summary List  
 Comorbidities: Bladder Cancer: 2014 (currently cancer free); hearing impaired, Asthma, Right RTC repair:  Prior Level of Function: Patient has had chronic neck and low back pain. Patient enjoys riding a stationary bike. The Plan of Care and following information is based on the information from the initial evaluation. Assessment/ key information: Patient presents with cervical pain that began over 10 years ago. Patient denies any injury, but stated pain has become progressively worse over the last couple of years. Patient describes cervical pain as constant stiffness/aching and has constant numbness/tingling in Right UE that has been occurring for the last 2-3 years. Patient stated that he can notice some weakness in right UE occasionally. Patient exhibits decreased cervical AROM, postural dysfunction consisting of forward head, rounded shoulder and increased kyphosis, and increased tightness/tenderness present along (B) UT/Levator scap/cervical paraspinals. In regards to Lumbar issues, patient has had chronic pain for over 10 years, which began with an insidious onset. Pain has gradually worsened over the last couple of years.  Patient describes pain as intermittent aching/stiffness. Pain increases with walking (15 min) and standing (15-20min). Patient denied numbness/tingling in (B) LE, and has not experienced any falls. Patient presents with significant HS tightness bilaterally, poor core stability, and decreased hip strength. Patient would benefit from skilled PT to address above deficits and assist with return to PLOF. Evaluation Complexity History MEDIUM  Complexity : 1-2 comorbidities / personal factors will impact the outcome/ POC ; Examination MEDIUM Complexity : 3 Standardized tests and measures addressing body structure, function, activity limitation and / or participation in recreation  ;Presentation MEDIUM Complexity : Evolving with changing characteristics  ; Clinical Decision Making MEDIUM Complexity : FOTO score of 26-74 Overall Complexity Rating: MEDIUM Problem List: pain affecting function, decrease ROM, decrease strength, impaired gait/ balance, decrease ADL/ functional abilitiies, decrease activity tolerance, decrease flexibility/ joint mobility and decrease transfer abilities Treatment Plan may include any combination of the following: Therapeutic exercise, Therapeutic activities, Neuromuscular re-education, Physical agent/modality, Gait/balance training, Manual therapy, Patient education, Functional mobility training and Stair training Patient / Family readiness to learn indicated by: asking questions, trying to perform skills and interest 
Persons(s) to be included in education: patient (P) Barriers to Learning/Limitations: None Patient Goal (s): normal movement Patient Self Reported Health Status: good Rehabilitation Potential: good Short Term Goals: To be accomplished in 2 weeks: 1. Patient will be ind and compliant with HEP 1-2x/day to increase ease with ADLs. 2. Patient will improve cervical AROM rotation and SB by 10 deg each to increase ease with driving. Long Term Goals: To be accomplished in 5 weeks: 1. Patient will improve neck FOTO to 62 and lumbar FOTO to 60 to demonstrate functional improvement. 2. Patient will improve (B) HS flexibility at popliteal angle to 45deg to increase ease with dressing. 3. Patient will report average neck pain to be no more then 3/10 to assist with return to PLOF. Frequency / Duration: Patient to be seen 2 times per week for 5 weeks. Patient/ Caregiver education and instruction: Diagnosis, prognosis, exercises 
 [x]  Plan of care has been reviewed with SILVER 
 
G-Lexy (GP) Position  Current  CK= 40-59%  Goal  CJ= 20-39% The severity rating is based on clinical judgment and the FOTO score. Certification Period: 10/26/18- 12/25/18 Elda Silveira PT 10/26/2018 10:08 AM 
 
________________________________________________________________________ I certify that the above Therapy Services are being furnished while the patient is under my care. I agree with the treatment plan and certify that this therapy is necessary. [de-identified] Signature:____________________  Date:____________Time: _________ Please sign and return to In [address] [place], [address] 
[phone] fax

## 2018-10-26 NOTE — PROGRESS NOTES
PT DAILY TREATMENT NOTE 10-18 Patient Name: Pillo Sherman 
GQXK: : 1929 [x]  Patient  Verified Payor: [place] / Plan: Ganga Clayy / Product Type: Medicare / In time:10:10 Out time:11:00 Total Treatment Time (min): 50 Visit #: 1 of 10 Medicare/BCBS Only Total Timed Codes (min):  28 1:1 Treatment Time:  50 Treatment Area: Cervicalgia [M54.2] Low back pain [M54.5] Other cervical disc degeneration, unspecified cervical region [M50.30] Spondylosis without myelopathy or radiculopathy, lumbar region [M47.816] SUBJECTIVE Pain Level (0-10 scale): 10 Any medication changes, allergies to medications, adverse drug reactions, diagnosis change, or new procedure performed?: [x] No    [] Yes (see summary sheet for update) Subjective functional status/changes:   [] No changes reported See POC OBJECTIVE 22 min [x]Eval                  []Re-Eval  
 
 
18 min Therapeutic Exercise:  [x] See flow sheet : HEP creation and review Rationale: increase ROM and increase strength to improve the patients ability to increase ease with ADLs. 10 min Manual Therapy:  STM to (B) UT/levator scap/cervical paraspinals; (B) HS strethc, piriformis stretch Rationale: decrease pain, increase ROM and increase tissue extensibility to increase ease with ADLs. With 
 [] TE 
 [] TA 
 [] neuro 
 [] other: Patient Education: [x] Review HEP [] Progressed/Changed HEP based on:  
[] positioning   [] body mechanics   [] transfers   [] heat/ice application   
[] other:   
 
Other Objective/Functional Measures: See POC Pain Level (0-10 scale) post treatment: Neck: 5-6/10; Back: 0/10 ASSESSMENT/Changes in Function: See POC. Patient that manual felt good, and reported a slight decrease in neck pain at end of the session. Patient denied any lumbar pain at end of the session. Advised patient to perform HEP gently and to stop if pain increases. Patient will continue to benefit from skilled PT services to modify and progress therapeutic interventions, address functional mobility deficits, address ROM deficits, address strength deficits, analyze and address soft tissue restrictions, analyze and cue movement patterns, assess and modify postural abnormalities and address imbalance/dizziness to attain remaining goals. []  See Plan of Care 
[]  See progress note/recertification 
[]  See Discharge Summary Progress towards goals / Updated goals: 
See POC PLAN 
[]  Upgrade activities as tolerated     [x]  Continue plan of care 
[]  Update interventions per flow sheet      
[]  Discharge due to:_ 
[]  Other:_   
 
Zak Soares, PT 10/26/2018  1:49 PM 
 
Future Appointments Date Time Provider Bennie Krueger 10/31/2018  1:00 PM Lauren Herring [facility]  
11/2/2018 11:30 AM DANNIELLE BRIGGS [facility]  
11/6/2018 11:00 AM Daisy STORM, PTA [facility]  
11/8/2018 11:00 AM Lauren Herring [facility]  
11/13/2018 11:30 AM Daisy Preston, PTA [facility]  
11/15/2018 11:30 AM Lauren Herring [facility]  
11/20/2018 12:00 PM Daisy Preston, PTA [facility]  
9/12/2019  2:00 PM Yokasta Morocho MD [address] [place] [address]

## 2018-10-31 ENCOUNTER — HOSPITAL ENCOUNTER (OUTPATIENT)
Dept: PHYSICAL THERAPY | Age: 83
Discharge: HOME | End: 2018-10-31
Payer: MEDICARE

## 2018-10-31 PROCEDURE — 97110 THERAPEUTIC EXERCISES: CPT

## 2018-10-31 PROCEDURE — 97140 MANUAL THERAPY 1/> REGIONS: CPT

## 2018-10-31 NOTE — PROGRESS NOTES
PT DAILY TREATMENT NOTE - [facility]  Patient Name: Shyann Baez 
Date:10/31/2018 : 1929 [x]  Patient  Verified Payor: [place] MEDICARE / Plan: Ganga Arnold / Product Type: Medicare / In time: 2:31pm  Out time:3:24pm 
Total Treatment Time (min): 53 Total Timed Codes (min): 53 
1:1 Treatment Time ( [address] only): 30 Visit #: 2 of 10 Treatment Area: Low back pain [M54.5] Spondylosis without myelopathy or radiculopathy, lumbar region [M47.816] SUBJECTIVE Pain Level (0-10 scale): 6-7 Any medication changes, allergies to medications, adverse drug reactions, diagnosis change, or new procedure performed?: [x] No    [] Yes (see summary sheet for update) Subjective functional status/changes:   [] No changes reported Pt reports HEP compliance. OBJECTIVE 45 min Therapeutic Exercise:  [x] See flow sheet :  
Rationale: increase ROM and increase strength to improve the patients ability to improve daily activity tolerance. 8 min Manual Therapy:  C/S STM bilat Rationale: decrease pain, increase ROM and increase tissue extensibility to improve ease of daily activity. With 
 [] TE 
 [] TA 
 [] neuro 
 [] other: Patient Education: [x] Review HEP [] Progressed/Changed HEP based on:  
[] positioning   [] body mechanics   [] transfers   [] heat/ice application   
[] other:   
 
Other Objective/Functional Measures: limited trunk and C/S ROM, maintains kyphotic forward head posture TTP B upper trap R > L Pain Level (0-10 scale) post treatment: 3-4 ASSESSMENT/Changes in Function: Pt reports some decline in pain post treatment. Continues to be limited by poor trunk and neck ROM and forward head posture.  
 
Patient will continue to benefit from skilled PT services to modify and progress therapeutic interventions, address functional mobility deficits, address ROM deficits, address strength deficits, analyze and address soft tissue restrictions, analyze and cue movement patterns and instruct in home and community integration to attain remaining goals. []  See Plan of Care 
[]  See progress note/recertification 
[]  See Discharge Summary Progress towards goals / Updated goals: 
Short Term Goals: To be accomplished in 2 weeks: 1. Patient will be ind and compliant with HEP 1-2x/day to increase ease with ADLs. 2. Patient will improve cervical AROM rotation and SB by 10 deg each to increase ease with driving. Long Term Goals: To be accomplished in 5 weeks: 1. Patient will improve neck FOTO to 62 and lumbar FOTO to 60 to demonstrate functional improvement. 2. Patient will improve (B) HS flexibility at popliteal angle to 45deg to increase ease with dressing. 3. Patient will report average neck pain to be no more then 3/10 to assist with return to PLOF. PLAN []  Upgrade activities as tolerated     [x]  Continue plan of care 
[]  Update interventions per flow sheet      
[]  Discharge due to:_ 
[]  Other:_ Maylon Post, PT, DPT, ATC 10/31/2018  2:37 PM 
 
Future Appointments Date Time Provider Bennie Krueger 11/2/2018 11:30 AM DANNIELLE BRIGGS [facility]  
11/6/2018 11:00 AM Nikolai STORM PTA [facility]  
11/8/2018 11:00 AM Buddy Marshall [facility]  
11/13/2018 11:30 AM Nikolai Lowe PTA [facility]  
11/15/2018 11:30 AM Buddy Marshall [facility]  
11/20/2018 12:00 PM Nikolai Lowe PTA [facility]  
9/12/2019  2:00 PM Henny Link MD [address] [place] [address]

## 2018-11-02 ENCOUNTER — HOSPITAL ENCOUNTER (OUTPATIENT)
Dept: PHYSICAL THERAPY | Age: 83
Discharge: HOME | End: 2018-11-02
Payer: MEDICARE

## 2018-11-02 PROCEDURE — 97110 THERAPEUTIC EXERCISES: CPT

## 2018-11-02 PROCEDURE — 97140 MANUAL THERAPY 1/> REGIONS: CPT

## 2018-11-02 NOTE — PROGRESS NOTES
PT DAILY TREATMENT NOTE 10-18 Patient Name: Kenia Booth 
Date:2018 : 1929 [x]  Patient  Verified Payor: [place] MEDICARE / Plan: Ganga Clayy / Product Type: Medicare / In time:11:30  Out time:12:24 Total Treatment Time (min): 54 Visit #: 3 of 10 Medicare/BCBS Only Total Timed Codes (min):  54 1:1 Treatment Time:  44 Treatment Area: Low back pain [M54.5] Spondylosis without myelopathy or radiculopathy, lumbar region [M47.816] SUBJECTIVE Pain Level (0-10 scale): 5/10 Any medication changes, allergies to medications, adverse drug reactions, diagnosis change, or new procedure performed?: [x] No    [] Yes (see summary sheet for update) Subjective functional status/changes:   [] No changes reported Patient stated that he was sore yesterday after doing the exercises the previous day, but wasn't too bad and he was able to work it out. OBJECTIVE 46 min Therapeutic Exercise:  [x] See flow sheet :  
Rationale: increase ROM and increase strength to improve the patients ability to perform ADLs. 8 min Manual Therapy: In sitting: STM to (B) UT/levator scap/cervical paraspinals Rationale: decrease pain, increase ROM and increase tissue extensibility to increase ease with ADLs. With 
 [] TE 
 [] TA 
 [] neuro 
 [] other: Patient Education: [x] Review HEP [] Progressed/Changed HEP based on:  
[] positioning   [] body mechanics   [] transfers   [] heat/ice application   
[] other:   
 
Other Objective/Functional Measures: Patient unable to straighten (B) knees with seated LAQ 2/2 HS restriction. Pain Level (0-10 scale) post treatment: Neck:0/10; LBP: 1/10 ASSESSMENT/Changes in Function: Patient reported reduced pain/tightness following manual, and at end of the session patient reported no pain in neck and only mild pain in low back.  Advised patient to continue at home with just the exercises on the HEP, and will update soon as long as patient tolerates exercises in clinic well. Patient will continue to benefit from skilled PT services to modify and progress therapeutic interventions, address functional mobility deficits, address ROM deficits, address strength deficits, analyze and address soft tissue restrictions, analyze and cue movement patterns, assess and modify postural abnormalities and address imbalance/dizziness to attain remaining goals. []  See Plan of Care 
[]  See progress note/recertification 
[]  See Discharge Summary Progress towards goals / Updated goals: 
Short Term Goals: To be accomplished in 2 weeks: 
            9. Patient will be ind and compliant with HEP 1-2x/day to increase ease with ADLs. MET (11/2/18)             2. Patient will improve cervical AROM rotation and SB by 10 deg each to increase ease with driving. Long Term Goals: To be accomplished in 5 weeks: 
            1. Patient will improve neck FOTO to 62 and lumbar FOTO to 60 to demonstrate functional improvement.   
            2. Patient will improve (B) HS flexibility at popliteal angle to 45deg to increase ease with dressing. 
            3. Patient will report average neck pain to be no more then 3/10 to assist with return to PLOF. PLAN 
[]  Upgrade activities as tolerated     [x]  Continue plan of care 
[]  Update interventions per flow sheet      
[]  Discharge due to:_ 
[]  Other:_   
 
Marla Chavez, PT 11/2/2018  11:34 AM 
 
Future Appointments Date Time Provider Bennie Krueger 11/6/2018 11:00 AM [facility]  
11/8/2018 11:00 AM [facility]  
11/13/2018 11:30 [facility]  
11/15/2018 11:30 AM [facility] Ro[facility]  
11/20/2018 12:00 PM [facility]  
9/12/2019  2:00 PM Jacinto Villar MD [address] [place] [address]

## 2018-11-06 ENCOUNTER — HOSPITAL ENCOUNTER (OUTPATIENT)
Dept: PHYSICAL THERAPY | Age: 83
Discharge: HOME | End: 2018-11-06
Payer: MEDICARE

## 2018-11-06 NOTE — PROGRESS NOTES
PT DAILY TREATMENT NOTE 10-18 Patient Name: Teresa Hunter 
Date:2018 : 1929 [x]  Patient  Verified Payor: [place] MEDICARE / Plan: Ganga Saldivar y / Product Type: Medicare / In time:11:01  Out time:11:49 Total Treatment Time (min): 48 Visit #: 4 of 10 NO CHARGE secondary to TC with LPTA Medicare/BCBS Only Total Timed Codes (min):  48 1:1 Treatment Time:  36  
 
 
Treatment Area: Low back pain [M54.5] Spondylosis without myelopathy or radiculopathy, lumbar region [M47.816] SUBJECTIVE Pain Level (0-10 scale): 5/10 Any medication changes, allergies to medications, adverse drug reactions, diagnosis change, or new procedure performed?: [x] No    [] Yes (see summary sheet for update) Subjective functional status/changes:   [] No changes reported \"Stiff in the mornings. \" OBJECTIVE 40 min Therapeutic Exercise:  [x] See flow sheet :  
Rationale: increase ROM, increase strength and increase proprioception to improve the patients ability to perform ADL's and functional activities. 8 min Manual Therapy:  Manual (B) HS stretch, contract relax technique. Rationale: decrease pain, increase ROM and increase tissue extensibility to improve activity tolerance. With 
 [x] TE 
 [] TA 
 [] neuro 
 [] other: Patient Education: [x] Review HEP [] Progressed/Changed HEP based on:  
[] positioning   [] body mechanics   [] transfers   [] heat/ice application   
[] other:   
 
Other Objective/Functional Measures: AROM C/S Rotation Left 40 degrees, Right 42 degrees, Lateral side-bend Left 21 degrees, Right 20 degrees. Pain Level (0-10 scale) post treatment: 0-1/10 ASSESSMENT/Changes in Function: Limited (B) HS flexibility. Pt reported minimal discomfort post-treatment.  
 
Patient will continue to benefit from skilled PT services to modify and progress therapeutic interventions, address functional mobility deficits, address ROM deficits, address strength deficits, analyze and cue movement patterns and analyze and modify body mechanics/ergonomics to attain remaining goals. [x]  See Plan of Care 
[]  See progress note/recertification 
[]  See Discharge Summary Progress towards goals / Updated goals: 
Short Term Goals: To be accomplished in 2 weeks: 
            5. Patient will be ind and compliant with HEP 1-2x/day to increase ease with ADLs. MET (11/2/18)             2. Patient will improve cervical AROM rotation and SB by 10 deg each to increase ease with driving. - AROM C/S Rotation Left 40 degrees, Right 42 degrees, Lateral side-bend Left 21 degrees, Right 20 degrees. 11/6/2018 Long Term Goals: To be accomplished in 5 weeks: 
            1. Patient will improve neck FOTO to 62 and lumbar FOTO to 60 to demonstrate functional improvement.   
            2. Patient will improve (B) HS flexibility at popliteal angle to 45deg to increase ease with dressing. 
            3. Patient will report average neck pain to be no more then 3/10 to assist with return to PLOF.  PLAN 
[]  Upgrade activities as tolerated     [x]  Continue plan of care 
[]  Update interventions per flow sheet      
[]  Discharge due to:_ 
[]  Other:_   
 
Guera Stacy PTA 11/6/2018  11:04 AM 
 
Future Appointments Date Time Provider Bennie Krueger 11/8/2018 11:00 AM Scott Lang [facility]  
11/13/2018 11:30 AM Ajit Woods PTA [facility]  
11/15/2018 11:30 AM Scott Lang [facility]  
11/20/2018 12:00 PM Ajit Woods PTA [facility]  
9/12/2019  2:00 PM Wen Kay MD [address] [place] [address]

## 2018-11-08 ENCOUNTER — HOSPITAL ENCOUNTER (OUTPATIENT)
Dept: PHYSICAL THERAPY | Age: 83
Discharge: HOME | End: 2018-11-08
Payer: MEDICARE

## 2018-11-08 PROCEDURE — 97140 MANUAL THERAPY 1/> REGIONS: CPT

## 2018-11-08 PROCEDURE — 97110 THERAPEUTIC EXERCISES: CPT

## 2018-11-08 NOTE — PROGRESS NOTES
PT DAILY TREATMENT NOTE 10-18 Patient Name: Darvin Odom 
Date:2018 : 1929 [x]  Patient  Verified Payor: [place] MEDICARE / Plan: Ganga Arnold / Product Type: Medicare / In time:10:33  Out time:11:25 Total Treatment Time (min): 52 Visit #: 5 of 10 Medicare/BCBS Only Total Timed Codes (min):  52 1:1 Treatment Time:  42 Treatment Area: Low back pain [M54.5] Spondylosis without myelopathy or radiculopathy, lumbar region [M47.816] SUBJECTIVE Pain Level (0-10 scale): 4-5/10 Any medication changes, allergies to medications, adverse drug reactions, diagnosis change, or new procedure performed?: [x] No    [] Yes (see summary sheet for update) Subjective functional status/changes:   [x] No changes reported OBJECTIVE 44 min Therapeutic Exercise:  [x] See flow sheet :  
Rationale: increase ROM, increase strength and increase proprioception to improve the patients ability to perform ADL's and functional activities. 8 min Manual Therapy:  STM/MFR to c/s paraspinals Rationale: decrease pain, increase ROM and increase tissue extensibility to improve activity tolerance. With 
 [x] TE 
 [] TA 
 [] neuro 
 [] other: Patient Education: [x] Review HEP [] Progressed/Changed HEP based on:  
[] positioning   [] body mechanics   [] transfers   [] heat/ice application   
[] other:   
 
Other Objective/Functional Measures:  Performed c/s rotation in supine due to pain with rotation in sitting. Pain Level (0-10 scale) post treatment: 0/10 ASSESSMENT/Changes in Function:  Pt with good relief treatment and reported no pain following. Patient will continue to benefit from skilled PT services to modify and progress therapeutic interventions, address functional mobility deficits, address ROM deficits, address strength deficits, analyze and cue movement patterns and analyze and modify body mechanics/ergonomics to attain remaining goals. [x]  See Plan of Care []  See progress note/recertification 
[]  See Discharge Summary Progress towards goals / Updated goals: 
Short Term Goals: To be accomplished in 2 weeks: 
            3. Patient will be ind and compliant with HEP 1-2x/day to increase ease with ADLs. MET (11/2/18)             2. Patient will improve cervical AROM rotation and SB by 10 deg each to increase ease with driving. - AROM C/S Rotation Left 40 degrees, Right 42 degrees, Lateral side-bend Left 21 degrees, Right 20 degrees. 11/6/2018 Long Term Goals: To be accomplished in 5 weeks: 
            1. Patient will improve neck FOTO to 62 and lumbar FOTO to 60 to demonstrate functional improvement.   
            2. Patient will improve (B) HS flexibility at popliteal angle to 45deg to increase ease with dressing. 
            3. Patient will report average neck pain to be no more then 3/10 to assist with return to PLOF.  PLAN 
[]  Upgrade activities as tolerated     [x]  Continue plan of care 
[]  Update interventions per flow sheet      
[]  Discharge due to:_ 
[]  Other:_ Chelsie Lea, PT 11/8/2018  11:04 AM 
 
Future Appointments Date Time Provider Bennie Krueger 11/13/2018 12:00 PM [facility]  
11/15/2018 11:00 AM [facility]choff[facility]  
11/20/2018 11:30 AM [facility]NIELLE Knowles [facility]  
9/12/2019  2:00 PM Jessa Clarke MD [address] [place] [address]

## 2018-11-13 ENCOUNTER — HOSPITAL ENCOUNTER (OUTPATIENT)
Dept: PHYSICAL THERAPY | Age: 83
Discharge: HOME | End: 2018-11-13
Payer: MEDICARE

## 2018-11-13 PROCEDURE — 97110 THERAPEUTIC EXERCISES: CPT

## 2018-11-13 NOTE — PROGRESS NOTES
PT DAILY TREATMENT NOTE 10-18 Patient Name: Darvin Odom 
Date:2018 : 1929 [x]  Patient  Verified Payor: [place] MED[place] / Plan: Ganga Arnold / Product Type: Medicare / In time: 12:02  Out time:12:55 Total Treatment Time (min): 53 Visit #:6of 10 Medicare/BCBS Only Total Timed Codes (min):  53 1:1 Treatment Time:  48 Treatment Area: Low back pain [M54.5] Spondylosis without myelopathy or radiculopathy, lumbar region [M47.816] SUBJECTIVE Pain Level (0-10 scale): 5/10 Any medication changes, allergies to medications, adverse drug reactions, diagnosis change, or new procedure performed?: [x] No    [] Yes (see summary sheet for update) Subjective functional status/changes:   [] No changes reported Patient stated that normally after he has PT he feels better and relief lasts a day, but then he goes back to being stiff. Patient reported waking up with more stiffness today. OBJECTIVE 53 min Therapeutic Exercise:  [x] See flow sheet : Updated HEP and reviewed with patient Rationale: increase ROM and increase strength to improve the patients ability to perform ADls. With 
 [] TE 
 [] TA 
 [] neuro 
 [] other: Patient Education: [x] Review HEP [] Progressed/Changed HEP based on:  
[] positioning   [] body mechanics   [] transfers   [] heat/ice application   
[] other:   
 
Other Objective/Functional Measures: HS flexibility at popliteal angle: Right: 55deg Left: 57deg Pain Level (0-10 scale) post treatment: 0/10 ASSESSMENT/Changes in Function: Updated HEP and reviewed with patient. Administered patient an orange t-band and showed patient how to safely use t-band at home for rows and extension. Patient did well with new exercises and was able to perform sit to stand transfers with good control and without UE support. Patient reported no pain at end of the session.   
 
Patient will continue to benefit from skilled PT services to modify and progress therapeutic interventions, address functional mobility deficits, address ROM deficits, address strength deficits, analyze and address soft tissue restrictions, analyze and cue movement patterns, assess and modify postural abnormalities and address imbalance/dizziness to attain remaining goals. []  See Plan of Care 
[]  See progress note/recertification 
[]  See Discharge Summary Progress towards goals / Updated goals: 
Short Term Goals: To be accomplished in 2 weeks: 
            7. Patient will be ind and compliant with HEP 1-2x/day to increase ease with ADLs. MET (11/2/18)  
            2. Patient will improve cervical AROM rotation and SB by 10 deg each to increase ease with driving. - AROM C/S Rotation Left 40 degrees, Right 42 degrees, Lateral side-bend Left 21 degrees, Right 20 degrees. 11/6/2018 Long Term Goals: To be accomplished in 5 weeks: 
            1. Patient will improve neck FOTO to 62 and lumbar FOTO to 60 to demonstrate functional improvement.   
            2. Patient will improve (B) HS flexibility at popliteal angle to 45deg to increase ease with dressing. HS flexibility at popliteal angle: Right: 55deg Left: 57deg (11/13/18)             3. Patient will report average neck pain to be no more then 3/10 to assist with return to PLOF.  PLAN 
[]  Upgrade activities as tolerated     [x]  Continue plan of care 
[]  Update interventions per flow sheet      
[]  Discharge due to:_ 
[]  Other:_   
 
Marla Chavez, PT 11/13/2018  12:11 PM 
 
Future Appointments Date Time Provider Bennie Krueger 11/15/2018 11:00 AM Indy Lau PT [facility]  
11/20/2018 11:30 AM DANNIEL[facility]  
9/12/2019  2:00 PM Jacinto Villar MD [address] [place] [address]

## 2018-11-15 ENCOUNTER — HOSPITAL ENCOUNTER (OUTPATIENT)
Dept: PHYSICAL THERAPY | Age: 83
Discharge: HOME | End: 2018-11-15
Payer: MEDICARE

## 2018-11-15 PROCEDURE — 97110 THERAPEUTIC EXERCISES: CPT

## 2018-11-15 NOTE — PROGRESS NOTES
PT DAILY TREATMENT NOTE 10-18 Patient Name: Shelby Code 
Date:11/15/2018 : 1929 [x]  Patient  Verified Payor: [place] MEDICARE / Plan: Ganga Arnold / Product Type: Medicare / In time: 1101  Out time:1158 Total Treatment Time (min): 57 Visit #: 7 of 10 Medicare/BCBS Only Total Timed Codes (min):  57 1:1 Treatment Time:  40 Treatment Area: Low back pain [M54.5] Spondylosis without myelopathy or radiculopathy, lumbar region [M47.816] SUBJECTIVE Pain Level (0-10 scale): STIFF 0 pain Any medication changes, allergies to medications, adverse drug reactions, diagnosis change, or new procedure performed?: [x] No    [] Yes (see summary sheet for update) Subjective functional status/changes:   [] No changes reported Patient continues to c/o neck and lumbar stiffness am>pm which is daily. OBJECTIVE 
57/40 min Therapeutic Exercise:  [x] See flow sheet :   
Rationale: increase ROM and increase strength to improve the patients ability to perform ADls. With 
 [] TE 
 [] TA 
 [] neuro 
 [] other: Patient Education: [x] Review HEP [] Progressed/Changed HEP based on:  
[] positioning   [] body mechanics   [] transfers   [] heat/ice application   
[] other:   
 
Other Objective/Functional Measures:   
C-S AROM left side flex 10 right 12 C-S rotation 45 degrees AROM Added SL and standing open books and bridging with orange Swiss ball FOTO LS 40 CS 38 Pain Level (0-10 scale) post treatment: 0/10 ASSESSMENT/Changes in Function:  
Pt challenged with open books and bridging over SB this session. Overall, his cervical AROM has improved to date. Pt continues to have significant mobility restrictions into bilateral knee extension secondary to extremely tight hamstrings despite all stretching. Pt educated on the importance of limiting sedentary postures and sleeping in the recliner in order to minimize restrictions.  Pt was encouraged to avoid sacral sitting, however, difficult with hamstring limitations. Pt continues to note functional improvement despite decreased FOTO scores. Consider DC as pt would benefit from continued daily performance of comprehensive HEP. Patient will continue to benefit from skilled PT services to modify and progress therapeutic interventions, address functional mobility deficits, address ROM deficits, address strength deficits, analyze and address soft tissue restrictions, analyze and cue movement patterns, assess and modify postural abnormalities and address imbalance/dizziness to attain remaining goals. []  See Plan of Care 
[]  See progress note/recertification 
[]  See Discharge Summary Progress towards goals / Updated goals: 
Short Term Goals: To be accomplished in 2 weeks: 
            0. Patient will be ind and compliant with HEP 1-2x/day to increase ease with ADLs. MET (11/2/18)  
            2. Patient will improve cervical AROM rotation and SB by 10 deg each to increase ease with driving. - MET-AROM C/S Rotation bilateral 45 degrees; left side flex 10 degrees right side flex 12 degrees 11/15/18 Long Term Goals: To be accomplished in 5 weeks: 
            1. Patient will improve neck FOTO to 62 and lumbar FOTO to 60 to demonstrate functional improvement.   
            2. Patient will improve (B) HS flexibility at popliteal angle to 45deg to increase ease with dressing. HS flexibility at popliteal angle: Right: 55deg Left: 57deg (11/13/18)             3. Patient will report average neck pain to be no more then 3/10 to assist with return to PLOF.  PLAN 
[]  Upgrade activities as tolerated     [x]  Continue plan of care 
[]  Update interventions per flow sheet      
[]  Discharge due to:_ 
[x]  Other:_ FOTOs completed; up for PN NV; consider DC as pt appears to have plateued at this time and to benefit from HEP Brandi Muniz PT 11/15/2018  12:11 PM 
 
Future Appointments Date Time Provider Bennie Krueger 11/20/2018 11:30 AM [facility]  
9/12/2019  2:00 PM Davida Contreras MD [address] [place] [address]

## 2018-11-20 ENCOUNTER — HOSPITAL ENCOUNTER (OUTPATIENT)
Dept: PHYSICAL THERAPY | Age: 83
Discharge: HOME | End: 2018-11-20
Payer: MEDICARE

## 2018-11-20 PROCEDURE — 97110 THERAPEUTIC EXERCISES: CPT

## 2018-11-20 PROCEDURE — G8982 BODY POS GOAL STATUS: HCPCS

## 2018-11-20 PROCEDURE — G8983 BODY POS D/C STATUS: HCPCS

## 2018-11-20 NOTE — PROGRESS NOTES
PT DAILY TREATMENT NOTE - [facility]  Patient Name: Mia Villarreal 
Date:2018 : 1929 [x]  Patient  Verified Payor: [place] MEDICARE / Plan: Ganga Saldivar y / Product Type: Medicare / In time:11:30am  Out time:12:13pm 
Total Treatment Time (min): 43 Total Timed Codes (min): 43 
1:1 Treatment Time ([facility] only): 40 Visit #: 8 of 10 Treatment Area: Low back pain [M54.5] Spondylosis without myelopathy or radiculopathy, lumbar region [M47.816] SUBJECTIVE Pain Level (0-10 scale): 3-4 Any medication changes, allergies to medications, adverse drug reactions, diagnosis change, or new procedure performed?: [x] No    [] Yes (see summary sheet for update) Subjective functional status/changes:   [] No changes reported \"I had a rough time this weekend, but today's not bad. \" OBJECTIVE 43 min Therapeutic Exercise:  [x] See flow sheet :  
Rationale: increase ROM and increase strength to improve the patients ability to improve ease of daily activity and reduce pain With 
 [] TE 
 [] TA 
 [] neuro 
 [] other: Patient Education: [x] Review HEP [] Progressed/Changed HEP based on:  
[] positioning   [] body mechanics   [] transfers   [] heat/ice application   
[x] other: briefly reviewed HEP with pt and advised continued performance for self management of symptoms Other Objective/Functional Measures: remains kyphotic, limited C/S AROM & trunk AROM Reduction in pain reported post treatment Pain Level (0-10 scale) post treatment: 0 
 
ASSESSMENT/Changes in Function: Pt continues to demonstrate limited trunk and cervical ROM with limitations with regards to structurally kyphotic posture, as well as limited flexibility, particularly in bilat HS. He does reports good symptom control with PT interventions and HEP, but reports relief is mostly temporary . He has reached a therapeutic plateau at this time and will be DC from skilled PT to HEP for self management. Explained to pt the need for progress and medical necessity to justify continued PT. Pt verbalized understanding. []  See Plan of Care 
[]  See progress note/recertification 
[]  See Discharge Summary Progress towards goals / Updated goals: 
Short Term Goals: To be accomplished in 2 weeks: 
            9. Patient will be ind and compliant with HEP 1-2x/day to increase ease with ADLs. MET (11/2/18)  
            2. Patient will improve cervical AROM rotation and SB by 10 deg each to increase ease with driving. - MET-AROM C/S Rotation bilateral 45 degrees; left side flex 10 degrees right side flex 12 degrees 11/15/18 
  
Long Term Goals: To be accomplished in 5 weeks: 
            1. Patient will improve neck FOTO to 62 and lumbar FOTO to 60 to demonstrate functional improvement. Decline to Neck 38, lumbar 40, not consistent with pt presentation or functional reports, possibly indicative of falsely elevated/inaccurate intake scores 11/15/2018 
            2. Patient will improve (B) HS flexibility at popliteal angle to 45deg to increase ease with dressing. HS flexibility at popliteal angle: Right: 55deg Left: 57deg (11/13/18) [phone]. Patient will report average neck pain to be no more then 3/10 to assist with return to PLOF. Limited progress, pt reports some control with exercise, but still sometimes has pain as high as 5/10 11/20/2018 PLAN 
[]  Upgrade activities as tolerated     []  Continue plan of care 
[]  Update interventions per flow sheet      
[]  Discharge due to:_ 
[]  Other:_ Santo Burris, PT, DPT, ATC 11/20/2018  11:58 AM 
 
Future Appointments Date Time Provider Bennie Krueger 9/12/2019  2:00 PM June Williamson MD [address] [place] [address]

## 2018-12-14 NOTE — PROGRESS NOTES
[facility] [address]  [place], [address]  [phone] fax    Physical Therapy Discharge Summary    Patient name: Kenia Booth Start of Care: 10/26/2018   Referral source: Christin Stallworth MD : 1929   Medical/Treatment Diagnosis: Low back pain [M54.5]  Spondylosis without myelopathy or radiculopathy, lumbar region [M47.816]  Payor: Lelia Moran / Plan: VA MEDICARE PART A & B / Product Type: Medicare /  Onset Date:> 10 year ago     Prior Hospitalization: see medical history Provider#: [id number]   Medications: Verified on Patient Summary List     Comorbidities: Bladder Cancer: 2014 (currently cancer free); hearing impaired, Asthma, Right RTC repair:    Prior Level of Function: Patient has had chronic neck and low back pain. Patient enjoys riding a stationary bike. Visits from Start of Care: 8    Missed Visits: 0  Reporting Period : 10/26/2018 to 2018    Summary of Care:  Short Term Goals: To be accomplished in 2 weeks:              1. Patient will be ind and compliant with HEP 1-2x/day to increase ease with ADLs. MET (18)               2. Patient will improve cervical AROM rotation and SB by 10 deg each to increase ease with driving. - MET-AROM C/S Rotation bilateral 45 degrees; left side flex 10 degrees right side flex 12 degrees 11/15/18     Long Term Goals: To be accomplished in 5 weeks:              1. Patient will improve neck FOTO to 62 and lumbar FOTO to 60 to demonstrate functional improvement. Decline to Neck 38, lumbar 40, not consistent with pt presentation or functional reports, possibly indicative of falsely elevated/inaccurate intake scores 11/15/2018              2. Patient will improve (B) HS flexibility at popliteal angle to 45deg to increase ease with dressing.  HS flexibility at popliteal angle: Right: 55deg Left: 57deg (18)               3. Patient will report average neck pain to be no more then 3/10 to assist with return to PLOF. Limited progress, pt reports some control with exercise, but still sometimes has pain as high as 5/10 11/20/2018    G-Codes (GP)    Position  [id number] Current  CK= 40-59%   Goal  CJ= 20-39%    The severity rating is based on clinical judgment and the FOTO score. ASSESSMENT/RECOMMENDATIONS: Pt continues to demonstrate limited trunk and cervical ROM with limitations with regards to structurally kyphotic posture, as well as limited flexibility, particularly in bilat HS. He does reports good symptom control with PT interventions and HEP, but reports relief is mostly temporary . He has reached a therapeutic plateau at this time and will be DC from skilled PT to HEP for self management.     [x]Discontinue therapy: [x]Patient has reached or is progressing toward set goals      []Patient is non-compliant or has abdicated      []Due to lack of appreciable progress towards set goals    Roberto Isaacs, PT, DPT, ATC 12/14/2018 3:02 PM

## 2019-01-01 ENCOUNTER — HOSPITAL ENCOUNTER (OUTPATIENT)
Dept: LAB | Age: 84
Discharge: HOME | End: 2019-10-15
Payer: MEDICARE

## 2019-01-01 ENCOUNTER — OFFICE VISIT (OUTPATIENT)
Dept: UROLOGY | Age: 84
End: 2019-01-01

## 2019-01-01 VITALS
HEART RATE: 75 BPM | DIASTOLIC BLOOD PRESSURE: 65 MMHG | SYSTOLIC BLOOD PRESSURE: 110 MMHG | OXYGEN SATURATION: 97 % | BODY MASS INDEX: 20.98 KG/M2 | HEIGHT: 66 IN

## 2019-01-01 DIAGNOSIS — D09.0 BLADDER CA IN SITU: Primary | ICD-10-CM

## 2019-01-01 DIAGNOSIS — N35.014 POST-TRAUMATIC MALE URETHRAL STRICTURE: ICD-10-CM

## 2019-01-01 DIAGNOSIS — N40.0 BENIGN PROSTATIC HYPERPLASIA, UNSPECIFIED WHETHER LOWER URINARY TRACT SYMPTOMS PRESENT: ICD-10-CM

## 2019-01-01 DIAGNOSIS — N41.1 CHRONIC PROSTATITIS: ICD-10-CM

## 2019-01-01 LAB
BILIRUB UR QL STRIP: NORMAL
GLUCOSE UR-MCNC: NEGATIVE MG/DL
KETONES P FAST UR STRIP-MCNC: NORMAL MG/DL
PH UR STRIP: 5.5 [PH] (ref 4.6–8)
PROT UR QL STRIP: NORMAL
PSA SERPL-MCNC: 0.3 NG/ML (ref 0–4)
SP GR UR STRIP: 1.02 (ref 1–1.03)
UA UROBILINOGEN AMB POC: NORMAL (ref 0.2–1)
URINALYSIS CLARITY POC: CLEAR
URINALYSIS COLOR POC: NORMAL
URINE BLOOD POC: NEGATIVE
URINE LEUKOCYTES POC: NEGATIVE
URINE NITRITES POC: NEGATIVE

## 2019-01-01 PROCEDURE — 84153 ASSAY OF PSA TOTAL: CPT

## 2019-01-01 RX ORDER — CIPROFLOXACIN 500 MG/1
500 TABLET ORAL 2 TIMES DAILY
Qty: 6 TAB | Refills: 0 | Status: SHIPPED | OUTPATIENT
Start: 2019-01-01 | End: 2019-01-01

## 2019-07-01 ENCOUNTER — OFFICE VISIT (OUTPATIENT)
Dept: UROLOGY | Age: 84
End: 2019-07-01

## 2019-07-01 VITALS
HEIGHT: 66 IN | DIASTOLIC BLOOD PRESSURE: 69 MMHG | SYSTOLIC BLOOD PRESSURE: 127 MMHG | HEART RATE: 76 BPM | BODY MASS INDEX: 21.27 KG/M2 | OXYGEN SATURATION: 97 %

## 2019-07-01 DIAGNOSIS — N41.1 PROSTATITIS, CHRONIC: ICD-10-CM

## 2019-07-01 DIAGNOSIS — R10.30 LOWER ABDOMINAL PAIN: Primary | ICD-10-CM

## 2019-07-01 RX ORDER — CIPROFLOXACIN 500 MG/1
500 TABLET ORAL 2 TIMES DAILY
Qty: 2 TAB | Refills: 0 | Status: SHIPPED | COMMUNITY
Start: 2019-07-01 | End: 2019-07-01 | Stop reason: CLARIF

## 2019-07-01 RX ORDER — CIPROFLOXACIN 500 MG/1
500 TABLET ORAL 2 TIMES DAILY
Qty: 20 TAB | Refills: 0 | Status: SHIPPED | COMMUNITY
Start: 2019-07-01 | End: 2019-07-01 | Stop reason: CLARIF

## 2019-07-01 RX ORDER — CIPROFLOXACIN 500 MG/1
500 TABLET ORAL 2 TIMES DAILY
Qty: 20 TAB | Refills: 0 | Status: SHIPPED | OUTPATIENT
Start: 2019-07-01 | End: 2019-07-11

## 2019-07-01 RX ORDER — AMLODIPINE BESYLATE 5 MG/1
TABLET ORAL
COMMUNITY
Start: 2019-05-15 | End: 2020-01-01 | Stop reason: ALTCHOICE

## 2019-07-01 NOTE — PROGRESS NOTES
Mr. Shala Sanchez has a reminder for a \"due or due soon\" health maintenance. I have asked that he contact his primary care provider for follow-up on this health maintenance.

## 2019-07-01 NOTE — PATIENT INSTRUCTIONS
Urine Test: About This Test 
What is it? A urine test checks the color, clarity (clear or cloudy), odor, concentration, and acidity (pH) of your urine. It also checks your levels of protein, sugar, blood cells, or other substances in your urine. This test is sometimes called a urinalysis. Why is this test done? A urine test may be done: · To check for a disease or infection of the urinary tract. The urinary tract includes the kidneys, the tubes that carry urine from the kidneys to the bladder (ureters), and the bladder. It also includes the tube that carries urine from the bladder to outside the body (urethra). · To check the treatment of conditions such as diabetes, kidney stones, a urinary tract infection (UTI), high blood pressure, or some kidney or liver diseases. How can you prepare for the test? 
· Before the test, don't eat foods that can change the color of your urine. Examples of these include blackberries, beets, and rhubarb. · Don't do heavy exercise before the test. 
· Tell your doctor if you are menstruating or close to starting your period. Your doctor may want to wait to do the test. 
· Tell your doctor about all the nonprescription and prescription medicines and herbs or other supplements you take. Some of these can affect the results of this test. 
What happens during the test? 
A urine test can be done in your doctor's office, clinic, or lab. Or you may be asked to collect a urine sample at home. Then you can take it to the office or lab for testing. Clean-catch midstream urine collection · Wash your hands before you start. · If the cup you are given has a lid, remove it carefully. Set it down with the inner surface up. Don't touch the inside of the cup with your fingers. · Clean the area around your genitals. ? For men: Pull back the foreskin, if present. Clean the head of your penis with medicated towelettes or swabs. ? For women: Spread open the genital folds of skin with one hand. Then use medicated towelettes or swabs in your other hand to clean the area where urine comes out (the urethra). Wipe the area from front to back. · Start urinating into the toilet or urinal. A woman should hold apart the genital folds of skin while she urinates. · After the urine has flowed for several seconds, place the cup into the urine stream. Collect about 2 ounces of urine without stopping your flow of urine. · Don't touch the rim of the cup to your genital area. Don't get toilet paper, pubic hair, stool (feces), menstrual blood, or anything else in the urine sample. · Finish urinating into the toilet or urinal. 
· Carefully replace and tighten the lid on the cup, and then return it to the lab. If you are collecting the urine at home and can't get it to the lab in an hour, refrigerate it. Double-voided urine sample collection This method collects the urine your body is making right now. · Urinate into the toilet or urinal. Don't collect any of this urine. · Drink a large glass of water, and wait about 30 to 40 minutes. · Then get a urine sample. Follow the instructions above for collecting a clean-catch urine sample. · Take the urine sample to the lab. If you are collecting the urine at home and can't get it to the lab in an hour, refrigerate it. Follow-up care is a key part of your treatment and safety. Be sure to make and go to all appointments, and call your doctor if you are having problems. It's also a good idea to keep a list of the medicines you take. Ask your doctor when you can expect to have your test results. Where can you learn more? Go to http://mary lou-bailey.info/. Enter R266 in the search box to learn more about \"Urine Test: About This Test.\" Current as of: June 25, 2018 Content Version: 11.9 © 0295-9882 [employer].  Care instructions adapted under license by [address] (which disclaims liability or warranty for this information). If you have questions about a medical condition or this instruction, always ask your healthcare professional. [address] any warranty or liability for your use of this information.

## 2019-07-01 NOTE — PROGRESS NOTES
Tiffanie Bonilla 80 y.o. male Mr. Kitty Cardona seen today for evaluation of intermittent transient pain in the penis occurring during the past 3 months not associated with urgency frequency or dysuria-asymptomatic today Pain is described as a sharp acute aching sensation lasting several seconds unrelieved by voiding associated with bowel movements no pain with defecation Followed in urology clinic for BPH and bladder cancer-here Voiding with a solid stream good control nocturia once per night 
surveillance cystoscopy-T-cell carcinoma bladder 2014/[facility] Rx ×6 Also followed for symptomatic BPH status post TURP in the 1980s and again in the 1990s Patient complains of leaking urine staining underwear 4 times each week not associated with urgency, frequency, suprapubic or flank pain not associated with Valsalva physical activity No dysuria-no visual changes-no neurologic symptoms 
  
Interval history-80year-old spouse with metastatic colon carcinoma status post chemotherapy 
  
  
History of TURP currently being urinary retention 1986 
  
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 
   
PSA 0.6 in August 2016 PSA 0.3 in September 2018 PVR 0 cc in July 2019 
 
negative surveillance cystoscopy in September 2016-bladder findings consistent with bladder outlet obstruction from BPH Surveillance cystoscopy negative in September 2018 Review of Systems:  
CNS: No seizures syncope headaches or dizziness no visual changes Respiratory: No wheezing no shortness of breath Cardiovascular: Hypertension Intestinal: No dyspepsia diarrhea or constipation Urinary: Symptomatic BPH status post TURP 1986/ 
Skeletal: Muscle aches and pains chronic low back pain/large joint arthritis Endocrine: No diabetes or thyroid disease Other: 
   
 
 
Allergies: Allergies Allergen Reactions  Aspirin Other (comments) Upset stomach Medications:   
Current Outpatient Medications Medication Sig Dispense Refill  ciprofloxacin HCl (CIPRO) 500 mg tablet Take 1 Tab by mouth two (2) times a day for 1 day. 20 Tab 0  
 eplerenone (INSPRA) 25 mg tablet Take  by mouth daily.  CRESTOR 20 mg tablet  amLODIPine (NORVASC) 5 mg tablet  acetaminophen (TYLENOL PO) Take  by mouth.  diclofenac (VOLTAREN) 1 % gel Apply  to affected area four (4) times daily. 200 g 2  
 olmesartan-hydroCHLOROthiazide (BENICAR HCT) 40-12.5 mg per tablet Take 1 Tab by mouth daily.  meloxicam (MOBIC) 15 mg tablet Take 1 Tab by mouth daily. 30 Tab 0  
 erythromycin (ILOTYCIN) ophthalmic ointment  fluticasone (FLONASE) 50 mcg/actuation nasal spray  KLOR-CON 10 10 mEq tablet  valsartan-hydrochlorothiazide (DIOVAN-HCT) 320-12.5 mg per tablet  gabapentin (NEURONTIN) 100 mg capsule  MULTIVIT-MINERALS/FOLIC ACID (ADULT MULTIVITAMIN GUMMIES PO) Take  by mouth. Past Medical History:  
Diagnosis Date  Bladder cancer ([facility] [address] 2014  Hypertension Past Surgical History:  
Procedure Laterality Date  HX CAROTID ENDARTERECTOMY  HX ROTATOR CUFF REPAIR Right Social History Socioeconomic History  Marital status:  Spouse name: Not on file  Number of children: Not on file  Years of education: Not on file  Highest education level: Not on file Occupational History  Not on file Social Needs  Financial resource strain: Not on file  Food insecurity:  
  Worry: Not on file Inability: Not on file  Transportation needs:  
  Medical: Not on file Non-medical: Not on file Tobacco Use  Smoking status: Never Smoker  Smokeless tobacco: Never Used Substance and Sexual Activity  Alcohol use: No  
 Drug use: No  
 Sexual activity: Never Lifestyle  Physical activity:  
  Days per week: Not on file Minutes per session: Not on file  Stress: Not on file Relationships  Social connections: Talks on phone: Not on file Gets together: Not on file Attends [employer] service: Not on file Active member of club or organization: Not on file Attends meetings of clubs or organizations: Not on file Relationship status: Not on file  Intimate partner violence:  
  Fear of current or ex partner: Not on file Emotionally abused: Not on file Physically abused: Not on file Forced sexual activity: Not on file Other Topics Concern  Not on file Social History Narrative  Not on file History reviewed. No pertinent family history. Physical Examination: Well-nourished trim and fit appearing elderly male in no apparent distress Prostate by CIARAN is rounded, smooth, benign consistency-mild tenderness with palpation evoking same pain patient has experienced spontaneously  
 
urinalysis: Negative dipstick/nitrite negative/heme-negative PVR today 0 Impression: Chronic prostatitis BPH Letter tumor history Plan: Cipro 500 mg twice daily x10 days RTC 2 weeks reevaluation More than 1/2 of this 15 minute visit was spent in counselling and coordination of care, as described above. Joe Rosenthal MD 
-electronically signed- 
 
PLEASE NOTE: 
This document has been produced using voice recognition software. Unrecognized errors in transcription may be present.

## 2019-07-11 ENCOUNTER — OFFICE VISIT (OUTPATIENT)
Dept: UROLOGY | Age: 84
End: 2019-07-11

## 2019-07-11 VITALS
HEART RATE: 65 BPM | WEIGHT: 130 LBS | DIASTOLIC BLOOD PRESSURE: 63 MMHG | BODY MASS INDEX: 20.89 KG/M2 | HEIGHT: 66 IN | OXYGEN SATURATION: 95 % | SYSTOLIC BLOOD PRESSURE: 108 MMHG

## 2019-07-11 DIAGNOSIS — N41.1 PROSTATITIS, CHRONIC: Primary | ICD-10-CM

## 2019-07-11 DIAGNOSIS — C67.9 BLADDER CARCINOMA (HCC): ICD-10-CM

## 2019-07-11 DIAGNOSIS — N40.0 BENIGN PROSTATIC HYPERPLASIA, UNSPECIFIED WHETHER LOWER URINARY TRACT SYMPTOMS PRESENT: ICD-10-CM

## 2019-07-11 LAB
BILIRUB UR QL STRIP: NORMAL
GLUCOSE UR-MCNC: NEGATIVE MG/DL
KETONES P FAST UR STRIP-MCNC: NORMAL MG/DL
PH UR STRIP: 5.5 [PH] (ref 4.6–8)
PROT UR QL STRIP: NEGATIVE
SP GR UR STRIP: 1.02 (ref 1–1.03)
UA UROBILINOGEN AMB POC: NORMAL (ref 0.2–1)
URINALYSIS CLARITY POC: CLEAR
URINALYSIS COLOR POC: YELLOW
URINE BLOOD POC: NEGATIVE
URINE LEUKOCYTES POC: NEGATIVE
URINE NITRITES POC: NEGATIVE

## 2019-07-11 NOTE — PROGRESS NOTES
Addis Navarro 80 y.o. male     Mr. Tati Pedroza seen today for reevaluation of irritable bladder symptoms treated empirically with a 10-day course of Cipro for suspected prostatitis  Patient reports favorable response to treatment-he is now asymptomatic voiding with a solid stream good control nocturia once per night    Initially presenting with complaint of intermittent transient pain in the penis occurring during the past 3 months not associated with urgency frequency or dysuria-asymptomatic today  Pain is described as a sharp acute aching sensation lasting several seconds unrelieved by voiding associated with bowel movements no pain with defecation     Followed in urology clinic for BPH and bladder cancer-here      Voiding with a solid stream good control nocturia once per night  surveillance cystoscopy-T-cell carcinoma bladder 2014/[facility] Rx ×6  Also followed for symptomatic BPH status post TURP in the 1980s and again in the 1990s  Patient complains of leaking urine staining underwear 4 times each week not associated with urgency, frequency, suprapubic or flank pain not associated with Valsalva physical activity  No dysuria-no visual changes-no neurologic symptoms     Interval history-80year-old spouse with metastatic colon carcinoma status post chemotherapy        History of TURP currently being urinary retention 1986     History of T-cell carcinoma of the bladder 2014-[facility] treatments ×6      PSA 0.6 in August 2016  PSA 0.3 in September 2018     PVR 0 cc in July 2019     negative surveillance cystoscopy in September 2016-bladder findings consistent with bladder outlet obstruction from BPH    Cystoscopy Report: 9/16/2018  After prepping the glans penis with Betadine solution and instilling 2% lidocaine jelly intraurethrally a flexible cystoscope was passed through the urethra into the bladder revealing normal urothelium throughout. Mei Racer are no stones, tumors, or diverticuli.  30 Welsh wide caliber bladder neck contracture-ureteral orifices are both slitlike in appearance with clear urine jets observed from both sides.  There are no blood vessel lesions no glomerulations no hemorrhages no injection or friability evident well excavated prostatic channel-procedure was uncomplicated well-tolerated  EBL-none  Specimen-none     Review of Systems:   CNS: No seizures syncope headaches or dizziness no visual changes  Respiratory: No wheezing no shortness of breath  Cardiovascular: Hypertension  Intestinal: No dyspepsia diarrhea or constipation  Urinary: Symptomatic BPH status post TURP 1986/  Skeletal: Muscle aches and pains chronic low back pain/large joint arthritis  Endocrine: No diabetes or thyroid disease  Other:      Allergies: Allergies   Allergen Reactions    Aspirin Other (comments)     Upset stomach        Medications:    Current Outpatient Medications   Medication Sig Dispense Refill    amLODIPine (NORVASC) 5 mg tablet       acetaminophen (TYLENOL PO) Take  by mouth.  eplerenone (INSPRA) 25 mg tablet Take  by mouth daily.  meloxicam (MOBIC) 15 mg tablet Take 1 Tab by mouth daily. 30 Tab 0    CRESTOR 20 mg tablet       ciprofloxacin HCl (CIPRO) 500 mg tablet Take 1 Tab by mouth two (2) times a day for 10 days. 20 Tab 0    diclofenac (VOLTAREN) 1 % gel Apply  to affected area four (4) times daily. 200 g 2    olmesartan-hydroCHLOROthiazide (BENICAR HCT) 40-12.5 mg per tablet Take 1 Tab by mouth daily.  erythromycin (ILOTYCIN) ophthalmic ointment       fluticasone (FLONASE) 50 mcg/actuation nasal spray       KLOR-CON 10 10 mEq tablet       valsartan-hydrochlorothiazide (DIOVAN-HCT) 320-12.5 mg per tablet       gabapentin (NEURONTIN) 100 mg capsule       MULTIVIT-MINERALS/FOLIC ACID (ADULT MULTIVITAMIN GUMMIES PO) Take  by mouth.          Past Medical History:   Diagnosis Date    Bladder cancer ([address] 2014    Hypertension       Past Surgical History:   Procedure Laterality Date    HX CAROTID ENDARTERECTOMY      HX ROTATOR CUFF REPAIR Right      Social History     Socioeconomic History    Marital status:      Spouse name: Not on file    Number of children: Not on file    Years of education: Not on file    Highest education level: Not on file   Occupational History    Not on file   Social Needs    Financial resource strain: Not on file    Food insecurity:     Worry: Not on file     Inability: Not on file    Transportation needs:     Medical: Not on file     Non-medical: Not on file   Tobacco Use    Smoking status: Never Smoker    Smokeless tobacco: Never Used   Substance and Sexual Activity    Alcohol use: No    Drug use: No    Sexual activity: Never   Lifestyle    Physical activity:     Days per week: Not on file     Minutes per session: Not on file    Stress: Not on file   Relationships    Social connections:     Talks on phone: Not on file     Gets together: Not on file     Attends Yarsanism service: Not on file     Active member of club or organization: Not on file     Attends meetings of clubs or organizations: Not on file     Relationship status: Not on file    Intimate partner violence:     Fear of current or ex partner: Not on file     Emotionally abused: Not on file     Physically abused: Not on file     Forced sexual activity: Not on file   Other Topics Concern    Not on file   Social History Narrative    Not on file      History reviewed. No pertinent family history. Physical Examination: Well-nourished mature male in no apparent distress      Urinalysis: Negative dipstick/nitrite negative/heme-negative    Impression: Chronic prostatitis with favorable symptom response to empiric antibiotic Rx                        History of bladder carcinoma      Plan: RTC 3 months for surveillance cystoscopy      More than 1/2 of this 10 minute visit was spent in counselling and coordination of care, as described above.   Artie Mcmanus MD  -electronically signed-    Leon Oconnor NOTE:  This document has been produced using voice recognition software. Unrecognized errors in transcription may be present.

## 2019-10-15 NOTE — PROGRESS NOTES
Mr. Julio Back has a reminder for a \"due or due soon\" health maintenance. I have asked that he contact his primary care provider for follow-up on this health maintenance.

## 2019-10-15 NOTE — PATIENT INSTRUCTIONS
Cystoscopy: Care Instructions  Your Care Instructions  Cystoscopy is a test. It uses a thin, lighted tube called a cystoscope to see the inside of the bladder and the urethra. The urethra is the tube that carries urine out of the body. This test is helpful because it lets your doctor see areas of your bladder and urethra that are hard to see on X-rays. It can help your doctor find bladder stones, tumors, bleeding, and infection. During this test, your doctor also can take tissue and urine samples. And if your doctor finds small stones or growths, he or she can remove them. In most cases the scope is in the bladder for less than 10 minutes. But the entire test may take 45 minutes or longer. You will probably get local anesthesia. This numbs a small part of your body. Or you may get spinal anesthesia, which numbs more of your body. Once in a while, doctors use general anesthesia. It makes you sleep during surgery. If you get a local anesthetic, you may be able to get up right after the test. But if you had spinal or general anesthesia, you will stay in the recovery room until you are able to walk or you have feeling again in your lower body. This usually takes about an hour. Your doctor may be able to tell you some of the results right after the test. But the complete results may take several days. Follow-up care is a key part of your treatment and safety. Be sure to make and go to all appointments, and call your doctor if you are having problems. It's also a good idea to know your test results and keep a list of the medicines you take. How can you care for yourself at home? Before the test  · If you are having a local anesthetic, you can eat and drink before the test.  · If you are having a spinal or general anesthetic, do not eat or drink anything for at least 8 hours before the test. Tell your doctor what medicines you take.   · If you are not staying overnight in the hospital, make sure you have someone who can drive you home after the test.  After the test  · If your doctor prescribed antibiotics, take them as directed. Do not stop taking them just because you feel better. You need to take the full course of antibiotics. · You may have some burning when you urinate for a day or two after the test. You may feel better if you drink more fluids. This may also help prevent an infection. · Your urine may have a pinkish color for a few days after the test.  When should you call for help? Call your doctor now or seek immediate medical care if:    · Your urine is still red or you see blood clots after you have urinated several times.     · You cannot pass urine 8 hours after the test.     · You get a fever or chills.     · You have pain in your belly or your back just below your rib cage. This is also called flank pain.    Watch closely for changes in your health, and be sure to contact your doctor if:    · You have pain or burning when you urinate. A burning sensation is normal for a day or two after the test. But call if it does not get better.     · You have a frequent urge to urinate but can pass only small amounts of urine.     · Your urine is pink, red, or cloudy or smells bad. It is normal for the urine to have a pinkish color for a few days after the test. But call if it does not get better.     · You do not get better as expected. Where can you learn more? Go to http://mary lou-bailey.info/. Enter J197 in the search box to learn more about \"Cystoscopy: Care Instructions. \"  Current as of: December 19, 2018  Content Version: 12.2  © 6628-8717 [employer]. Care instructions adapted under license by [employer] (which disclaims liability or warranty for this information).  If you have questions about a medical condition or this instruction, always ask your healthcare professional. [address] any warranty or liability for your use of this information.

## 2019-10-15 NOTE — PROGRESS NOTES
Courtney Knight 80 y.o. male     Mr. Viktoriya Vega seen today for surveillance cystoscopy and annual prostate evaluation    Patient is voiding well and has had no episodes of gross hematuria during the past year with a solid stream good control nocturia once per night no complaints regarding urination at this time     Mr. Viktoriya Vega seen today for reevaluation of irritable bladder symptoms treated empirically with a 10-day course of Cipro for suspected prostatitis  Patient reports favorable response to treatment-he is now asymptomatic voiding with a solid stream good control nocturia once per night     Initially presenting with complaint of intermittent transient pain in the penis occurring during the past 3 months not associated with urgency frequency or dysuria-asymptomatic today  Pain is described as a sharp acute aching sensation lasting several seconds unrelieved by voiding associated with bowel movements no pain with defecation     Followed in urology clinic for BPH and bladder cancer-here      Voiding with a solid stream good control nocturia once per night  surveillance cystoscopy-T-cell carcinoma bladder 2014/[facility] Rx ×6  Also followed for symptomatic BPH status post TURP in the 1980s and again in the 1990s  Patient complains of leaking urine staining underwear 4 times each week not associated with urgency, frequency, suprapubic or flank pain not associated with Valsalva physical activity  No dysuria-no visual changes-no neurologic symptoms     Interval history-80year-old spouse with metastatic colon carcinoma status post chemotherapy        History of TURP currently being urinary retention 1986     History of T-cell carcinoma of the bladder 2014-[facility] treatments ×6      PSA 0.6 in August 2016  PSA 0.3 in September 2018     PVR 0 cc in July 2019     negative surveillance cystoscopy in September 2016-bladder findings consistent with bladder outlet obstruction from BPH     Cystoscopy Report: 9/16/2018  After prepping the glans penis with Betadine solution and instilling 2% lidocaine jelly intraurethrally a flexible cystoscope was passed through the urethra into the bladder revealing normal urothelium throughout. Jaylyn Aver are no stones, tumors, or diverticuli.  30 Turkish wide caliber bladder neck contracture-ureteral orifices are both slitlike in appearance with clear urine jets observed from both sides.  There are no blood vessel lesions no glomerulations no hemorrhages no injection or friability evident well excavated prostatic channel-procedure was uncomplicated well-tolerated  EBL-none  Specimen-none    Cystoscopy in October 2019 within normal limits no evidence of recurrent bladder tumors     Review of Systems:   CNS: No seizures syncope headaches or dizziness no visual changes  Respiratory: No wheezing no shortness of breath  Cardiovascular: Hypertension  Intestinal: No dyspepsia diarrhea or constipation  Urinary: Symptomatic BPH status post TURP 1986/  Skeletal: Muscle aches and pains chronic low back pain/large joint arthritis  Endocrine: No diabetes or thyroid disease  Other:     Allergies: Allergies   Allergen Reactions    Aspirin Other (comments)     Upset stomach        Medications:    Current Outpatient Medications   Medication Sig Dispense Refill    amLODIPine (NORVASC) 5 mg tablet       eplerenone (INSPRA) 25 mg tablet Take  by mouth daily.  olmesartan-hydroCHLOROthiazide (BENICAR HCT) 40-12.5 mg per tablet Take 1 Tab by mouth daily.  CRESTOR 20 mg tablet       acetaminophen (TYLENOL PO) Take  by mouth.  diclofenac (VOLTAREN) 1 % gel Apply  to affected area four (4) times daily. 200 g 2    meloxicam (MOBIC) 15 mg tablet Take 1 Tab by mouth daily.  30 Tab 0    erythromycin (ILOTYCIN) ophthalmic ointment       fluticasone (FLONASE) 50 mcg/actuation nasal spray       KLOR-CON 10 10 mEq tablet       valsartan-hydrochlorothiazide (DIOVAN-HCT) 320-12.5 mg per tablet       gabapentin (NEURONTIN) 100 mg capsule       MULTIVIT-MINERALS/FOLIC ACID (ADULT MULTIVITAMIN GUMMIES PO) Take  by mouth. Past Medical History:   Diagnosis Date    Bladder cancer ([facility] [address] 2014    Hypertension       Past Surgical History:   Procedure Laterality Date    HX CAROTID ENDARTERECTOMY      HX ROTATOR CUFF REPAIR Right      Social History     Socioeconomic History    Marital status:      Spouse name: Not on file    Number of children: Not on file    Years of education: Not on file    Highest education level: Not on file   Occupational History    Not on file   Social Needs    Financial resource strain: Not on file    Food insecurity:     Worry: Not on file     Inability: Not on file    Transportation needs:     Medical: Not on file     Non-medical: Not on file   Tobacco Use    Smoking status: Never Smoker    Smokeless tobacco: Never Used   Substance and Sexual Activity    Alcohol use: No    Drug use: No    Sexual activity: Never   Lifestyle    Physical activity:     Days per week: Not on file     Minutes per session: Not on file    Stress: Not on file   Relationships    Social connections:     Talks on phone: Not on file     Gets together: Not on file     Attends [employer] service: Not on file     Active member of club or organization: Not on file     Attends meetings of clubs or organizations: Not on file     Relationship status: Not on file    Intimate partner violence:     Fear of current or ex partner: Not on file     Emotionally abused: Not on file     Physically abused: Not on file     Forced sexual activity: Not on file   Other Topics Concern    Not on file   Social History Narrative    Not on file      History reviewed. No pertinent family history.      Physical Examination: Well-nourished trim and fit appearing adult male in no apparent distress    Prostate by CIARAN is large rounded smooth benign consistency and nontender  No induration no nodularity  No rectal masses induration or tenderness    Urinalysis: Negative dipstick/nitrite negative/heme-negative      Cystoscopy Report: After prepping the glans penis with Betadine solution and instilling 2% lidocaine jelly intraurethrally a flexible cystoscope was passed through the urethra into the bladder revealing normal urothelium throughout. There are several flimsy iris strictures in the bulbar portion of the urethra easily broken on passage of the cystoscope. There are no stones, tumors, or trabeculations in the bladder. Ureteral orifices are both slitlike in appearance with clear urine jets observed from both sides. There are no abnormal blood vessels-no glomerulations injection hemorrhages or friability evident the prostatic channel is well excavated and widely patent from previous TURP-procedure was uncomplicated and well-tolerated  EBL-none  Specimen-none        Impression: Asymptomatic BPH stable status post TURP                        Bladder tumor history-FRANNIE on surveillance cystoscopy                         Flimsy iris urethral strictures      Plan: PSA today    Rx Cipro 500 mg twice daily x3 days    RTC 1 year PSA CIARAN cystoscopy      More than 1/2 of this 25 minute visit was spent in counselling and coordination of care, as described above. Itz Quinteros MD  -electronically signed-    PLEASE NOTE:  This document has been produced using voice recognition software. Unrecognized errors in transcription may be present.

## 2020-01-01 ENCOUNTER — HOSPITAL ENCOUNTER (EMERGENCY)
Dept: CT IMAGING | Age: 85
Discharge: HOME | DRG: 280 | End: 2020-10-05
Attending: EMERGENCY MEDICINE
Payer: MEDICARE

## 2020-01-01 ENCOUNTER — APPOINTMENT (OUTPATIENT)
Dept: GENERAL RADIOLOGY | Age: 85
DRG: 280 | End: 2020-01-01
Attending: HOSPITALIST
Payer: MEDICARE

## 2020-01-01 ENCOUNTER — HOME CARE VISIT (OUTPATIENT)
Dept: HOSPICE | Facility: HOSPICE | Age: 85
End: 2020-01-01
Payer: MEDICARE

## 2020-01-01 ENCOUNTER — OFFICE VISIT (OUTPATIENT)
Dept: SURGERY | Age: 85
End: 2020-01-01

## 2020-01-01 ENCOUNTER — TELEPHONE (OUTPATIENT)
Dept: FAMILY MEDICINE CLINIC | Age: 85
End: 2020-01-01

## 2020-01-01 ENCOUNTER — HOSPITAL ENCOUNTER (INPATIENT)
Age: 85
LOS: 3 days | Discharge: HOSPICE | DRG: 280 | End: 2020-10-08
Attending: EMERGENCY MEDICINE | Admitting: HOSPITALIST
Payer: MEDICARE

## 2020-01-01 ENCOUNTER — APPOINTMENT (OUTPATIENT)
Dept: NON INVASIVE DIAGNOSTICS | Age: 85
DRG: 280 | End: 2020-01-01
Attending: HOSPITALIST
Payer: MEDICARE

## 2020-01-01 ENCOUNTER — HOME CARE VISIT (OUTPATIENT)
Dept: SCHEDULING | Facility: HOME HEALTH | Age: 85
End: 2020-01-01
Payer: MEDICARE

## 2020-01-01 ENCOUNTER — HOSPICE ADMISSION (OUTPATIENT)
Dept: HOSPICE | Facility: HOSPICE | Age: 85
End: 2020-01-01
Payer: MEDICARE

## 2020-01-01 ENCOUNTER — VIRTUAL VISIT (OUTPATIENT)
Dept: FAMILY MEDICINE CLINIC | Age: 85
End: 2020-01-01

## 2020-01-01 ENCOUNTER — HOSPITAL ENCOUNTER (OUTPATIENT)
Age: 85
Discharge: HOME | End: 2020-06-29
Attending: ORTHOPAEDIC SURGERY
Payer: MEDICARE

## 2020-01-01 VITALS
RESPIRATION RATE: 18 BRPM | HEIGHT: 66 IN | SYSTOLIC BLOOD PRESSURE: 179 MMHG | BODY MASS INDEX: 18 KG/M2 | OXYGEN SATURATION: 97 % | HEART RATE: 95 BPM | DIASTOLIC BLOOD PRESSURE: 96 MMHG | TEMPERATURE: 98.3 F | WEIGHT: 112 LBS

## 2020-01-01 VITALS
HEIGHT: 66 IN | DIASTOLIC BLOOD PRESSURE: 76 MMHG | WEIGHT: 112 LBS | SYSTOLIC BLOOD PRESSURE: 134 MMHG | TEMPERATURE: 97.8 F | RESPIRATION RATE: 18 BRPM | BODY MASS INDEX: 18 KG/M2 | HEART RATE: 96 BPM

## 2020-01-01 VITALS
HEART RATE: 80 BPM | RESPIRATION RATE: 18 BRPM | TEMPERATURE: 97.2 F | OXYGEN SATURATION: 98 % | DIASTOLIC BLOOD PRESSURE: 52 MMHG | SYSTOLIC BLOOD PRESSURE: 98 MMHG

## 2020-01-01 VITALS
WEIGHT: 112 LBS | DIASTOLIC BLOOD PRESSURE: 70 MMHG | OXYGEN SATURATION: 99 % | SYSTOLIC BLOOD PRESSURE: 118 MMHG | TEMPERATURE: 97.5 F | BODY MASS INDEX: 18 KG/M2 | RESPIRATION RATE: 16 BRPM | HEIGHT: 66 IN | HEART RATE: 64 BPM

## 2020-01-01 DIAGNOSIS — Z51.5 HOSPICE CARE PATIENT: ICD-10-CM

## 2020-01-01 DIAGNOSIS — Z71.89 GOALS OF CARE, COUNSELING/DISCUSSION: ICD-10-CM

## 2020-01-01 DIAGNOSIS — F32.89 OTHER DEPRESSION: Primary | ICD-10-CM
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
## 2020-01-01 DIAGNOSIS — G93.40 ENCEPHALOPATHY ACUTE: ICD-10-CM

## 2020-01-01 DIAGNOSIS — M54.2 CERVICALGIA: ICD-10-CM

## 2020-01-01 DIAGNOSIS — R55 SYNCOPE AND COLLAPSE: ICD-10-CM

## 2020-01-01 DIAGNOSIS — I47.1: Primary | ICD-10-CM

## 2020-01-01 DIAGNOSIS — R13.10 DYSPHAGIA, UNSPECIFIED TYPE: ICD-10-CM

## 2020-01-01 DIAGNOSIS — Z01.818 PREOP EXAMINATION: Primary | ICD-10-CM

## 2020-01-01 DIAGNOSIS — R53.81 DEBILITY: ICD-10-CM

## 2020-01-01 DIAGNOSIS — E87.6 HYPOKALEMIA: ICD-10-CM

## 2020-01-01 DIAGNOSIS — I10 ESSENTIAL HYPERTENSION: ICD-10-CM

## 2020-01-01 LAB
ABO + RH BLD: NORMAL
ALBUMIN SERPL-MCNC: 2.8 G/DL (ref 3.4–5)
ALBUMIN SERPL-MCNC: 3.6 G/DL (ref 3.4–5)
ALBUMIN/GLOB SERPL: 0.7 {RATIO} (ref 0.8–1.7)
ALBUMIN/GLOB SERPL: 1.1 {RATIO} (ref 0.8–1.7)
ALP SERPL-CCNC: 65 U/L (ref 45–117)
ALP SERPL-CCNC: 66 U/L (ref 45–117)
ALT SERPL-CCNC: 17 U/L (ref 16–61)
ALT SERPL-CCNC: 17 U/L (ref 16–61)
ANION GAP SERPL CALC-SCNC: 11 MMOL/L (ref 3–18)
ANION GAP SERPL CALC-SCNC: 13 MMOL/L (ref 3–18)
ANION GAP SERPL CALC-SCNC: 7 MMOL/L (ref 3–18)
APPEARANCE UR: CLEAR
AST SERPL-CCNC: 24 U/L (ref 10–38)
AST SERPL-CCNC: 31 U/L (ref 10–38)
ATRIAL RATE: 138 BPM
BACTERIA SPEC CULT: NORMAL
BACTERIA URNS QL MICRO: ABNORMAL /HPF
BASOPHILS # BLD: 0 K/UL (ref 0–0.06)
BASOPHILS # BLD: 0 K/UL (ref 0–0.1)
BASOPHILS # BLD: 0 K/UL (ref 0–0.1)
BASOPHILS NFR BLD: 0 % (ref 0–2)
BASOPHILS NFR BLD: 0 % (ref 0–2)
BASOPHILS NFR BLD: 0 % (ref 0–3)
BILIRUB SERPL-MCNC: 0.9 MG/DL (ref 0.2–1)
BILIRUB SERPL-MCNC: 1.1 MG/DL (ref 0.2–1)
BILIRUB UR QL: NEGATIVE
BLOOD GROUP ANTIBODIES SERPL: NORMAL
BUN SERPL-MCNC: 23 MG/DL (ref 7–18)
BUN SERPL-MCNC: 27 MG/DL (ref 7–18)
BUN SERPL-MCNC: 29 MG/DL (ref 7–18)
BUN/CREAT SERPL: 17 (ref 12–20)
BUN/CREAT SERPL: 26 (ref 12–20)
BUN/CREAT SERPL: 35 (ref 12–20)
CALCIUM SERPL-MCNC: 10 MG/DL (ref 8.5–10.1)
CALCIUM SERPL-MCNC: 10.4 MG/DL (ref 8.5–10.1)
CALCIUM SERPL-MCNC: 9.8 MG/DL (ref 8.5–10.1)
CALCULATED P AXIS, ECG09: 82 DEGREES
CALCULATED R AXIS, ECG10: -47 DEGREES
CALCULATED T AXIS, ECG11: 94 DEGREES
CHLORIDE SERPL-SCNC: 101 MMOL/L (ref 100–111)
CHLORIDE SERPL-SCNC: 103 MMOL/L (ref 100–111)
CHLORIDE SERPL-SCNC: 96 MMOL/L (ref 100–111)
CK MB CFR SERPL CALC: 3.1 % (ref 0–4)
CK MB SERPL-MCNC: 5.4 NG/ML (ref 5–25)
CK SERPL-CCNC: 176 U/L (ref 39–308)
CO2 SERPL-SCNC: 29 MMOL/L (ref 21–32)
CO2 SERPL-SCNC: 30 MMOL/L (ref 21–32)
CO2 SERPL-SCNC: 30 MMOL/L (ref 21–32)
COLOR UR: YELLOW
CREAT SERPL-MCNC: 0.84 MG/DL (ref 0.6–1.3)
CREAT SERPL-MCNC: 0.9 MG/DL (ref 0.6–1.3)
CREAT SERPL-MCNC: 1.56 MG/DL (ref 0.6–1.3)
DIAGNOSIS, 93000: NORMAL
DIFFERENTIAL METHOD BLD: ABNORMAL
ECHO AO ROOT DIAM: 3.57 CM
ECHO LA AREA 4C: 10.78 CM2
ECHO LA VOL 2C: 14.59 ML (ref 18–58)
ECHO LA VOL 4C: 18.21 ML (ref 18–58)
ECHO LA VOL BP: 21.54 ML (ref 18–58)
ECHO LA VOL/BSA BIPLANE: 13.78 ML/M2 (ref 16–28)
ECHO LA VOLUME INDEX A2C: 9.33 ML/M2 (ref 16–28)
ECHO LA VOLUME INDEX A4C: 11.65 ML/M2 (ref 16–28)
ECHO LV INTERNAL DIMENSION DIASTOLIC: 3.07 CM (ref 4.2–5.9)
ECHO LV INTERNAL DIMENSION SYSTOLIC: 2.53 CM
ECHO LV IVSD: 1.19 CM (ref 0.6–1)
ECHO LV MASS 2D: 85.6 G (ref 88–224)
ECHO LV MASS INDEX 2D: 54.8 G/M2 (ref 49–115)
ECHO LV POSTERIOR WALL DIASTOLIC: 0.82 CM (ref 0.6–1)
ECHO LVOT DIAM: 2.4 CM
ECHO LVOT PEAK GRADIENT: 2.37 MMHG
ECHO LVOT PEAK VELOCITY: 76.93 CM/S
ECHO LVOT SV: 59.1 ML
ECHO LVOT VTI: 13.08 CM
EOSINOPHIL # BLD: 0 K/UL (ref 0–0.4)
EOSINOPHIL NFR BLD: 0 % (ref 0–5)
EPITH CASTS URNS QL MICRO: ABNORMAL /LPF (ref 0–5)
ERYTHROCYTE [DISTWIDTH] IN BLOOD BY AUTOMATED COUNT: 14.3 % (ref 11.6–14.5)
ERYTHROCYTE [DISTWIDTH] IN BLOOD BY AUTOMATED COUNT: 14.4 % (ref 11.6–14.5)
ERYTHROCYTE [DISTWIDTH] IN BLOOD BY AUTOMATED COUNT: 14.7 % (ref 11.6–14.5)
GLOBULIN SER CALC-MCNC: 3.2 G/DL (ref 2–4)
GLOBULIN SER CALC-MCNC: 3.9 G/DL (ref 2–4)
GLUCOSE BLD STRIP.AUTO-MCNC: 105 MG/DL (ref 70–110)
GLUCOSE SERPL-MCNC: 117 MG/DL (ref 74–99)
GLUCOSE SERPL-MCNC: 143 MG/DL (ref 74–99)
GLUCOSE SERPL-MCNC: 90 MG/DL (ref 74–99)
GLUCOSE UR STRIP.AUTO-MCNC: NEGATIVE MG/DL
HCT VFR BLD AUTO: 45.8 % (ref 36–48)
HCT VFR BLD AUTO: 46 % (ref 36–48)
HCT VFR BLD AUTO: 49.1 % (ref 36–48)
HGB BLD-MCNC: 15.7 G/DL (ref 13–16)
HGB BLD-MCNC: 15.8 G/DL (ref 13–16)
HGB BLD-MCNC: 16.8 G/DL (ref 13–16)
HGB UR QL STRIP: NEGATIVE
KETONES UR QL STRIP.AUTO: 15 MG/DL
LEUKOCYTE ESTERASE UR QL STRIP.AUTO: NEGATIVE
LVOT MG: 0.76 MMHG
LYMPHOCYTES # BLD: 1.2 K/UL (ref 0.9–3.6)
LYMPHOCYTES # BLD: 1.5 K/UL (ref 0.9–3.6)
LYMPHOCYTES # BLD: 2.6 K/UL (ref 0.8–3.5)
LYMPHOCYTES NFR BLD: 11 % (ref 21–52)
LYMPHOCYTES NFR BLD: 14 % (ref 20–51)
LYMPHOCYTES NFR BLD: 8 % (ref 21–52)
MAGNESIUM SERPL-MCNC: 2.1 MG/DL (ref 1.6–2.6)
MCH RBC QN AUTO: 33.9 PG (ref 24–34)
MCH RBC QN AUTO: 34.3 PG (ref 24–34)
MCH RBC QN AUTO: 34.4 PG (ref 24–34)
MCHC RBC AUTO-ENTMCNC: 34.1 G/DL (ref 31–37)
MCHC RBC AUTO-ENTMCNC: 34.2 G/DL (ref 31–37)
MCHC RBC AUTO-ENTMCNC: 34.5 G/DL (ref 31–37)
MCV RBC AUTO: 100.7 FL (ref 74–97)
MCV RBC AUTO: 99.2 FL (ref 74–97)
MCV RBC AUTO: 99.3 FL (ref 74–97)
MONOCYTES # BLD: 0.4 K/UL (ref 0–1)
MONOCYTES # BLD: 0.8 K/UL (ref 0.05–1.2)
MONOCYTES # BLD: 0.9 K/UL (ref 0.05–1.2)
MONOCYTES NFR BLD: 2 % (ref 2–9)
MONOCYTES NFR BLD: 5 % (ref 3–10)
MONOCYTES NFR BLD: 7 % (ref 3–10)
NEUTS SEG # BLD: 10.7 K/UL (ref 1.8–8)
NEUTS SEG # BLD: 13 K/UL (ref 1.8–8)
NEUTS SEG # BLD: 15.9 K/UL (ref 1.8–8)
NEUTS SEG NFR BLD: 82 % (ref 40–73)
NEUTS SEG NFR BLD: 84 % (ref 42–75)
NEUTS SEG NFR BLD: 87 % (ref 40–73)
NITRITE UR QL STRIP.AUTO: NEGATIVE
P-R INTERVAL, ECG05: 168 MS
PH UR STRIP: 5.5 [PH] (ref 5–8)
PLATELET # BLD AUTO: 224 K/UL (ref 135–420)
PLATELET # BLD AUTO: 225 K/UL (ref 135–420)
PLATELET # BLD AUTO: 241 K/UL (ref 135–420)
PLATELET COMMENTS,PCOM: ABNORMAL
PMV BLD AUTO: 11.3 FL (ref 9.2–11.8)
PMV BLD AUTO: 11.6 FL (ref 9.2–11.8)
PMV BLD AUTO: 11.7 FL (ref 9.2–11.8)
POTASSIUM SERPL-SCNC: 2.9 MMOL/L (ref 3.5–5.5)
POTASSIUM SERPL-SCNC: 3.1 MMOL/L (ref 3.5–5.5)
POTASSIUM SERPL-SCNC: 4.4 MMOL/L (ref 3.5–5.5)
PROT SERPL-MCNC: 6.7 G/DL (ref 6.4–8.2)
PROT SERPL-MCNC: 6.8 G/DL (ref 6.4–8.2)
PROT UR STRIP-MCNC: 100 MG/DL
Q-T INTERVAL, ECG07: 370 MS
QRS DURATION, ECG06: 116 MS
QTC CALCULATION (BEZET), ECG08: 509 MS
RBC # BLD AUTO: 4.57 M/UL (ref 4.7–5.5)
RBC # BLD AUTO: 4.61 M/UL (ref 4.7–5.5)
RBC # BLD AUTO: 4.95 M/UL (ref 4.7–5.5)
RBC MORPH BLD: ABNORMAL
SERVICE CMNT-IMP: NORMAL
SODIUM SERPL-SCNC: 138 MMOL/L (ref 136–145)
SODIUM SERPL-SCNC: 140 MMOL/L (ref 136–145)
SODIUM SERPL-SCNC: 142 MMOL/L (ref 136–145)
SP GR UR REFRACTOMETRY: 1.02 (ref 1–1.03)
SPECIMEN EXP DATE BLD: NORMAL
TROPONIN I SERPL-MCNC: 0.24 NG/ML (ref 0–0.04)
TROPONIN I SERPL-MCNC: 0.42 NG/ML (ref 0–0.04)
TROPONIN I SERPL-MCNC: 0.48 NG/ML (ref 0–0.04)
TROPONIN I SERPL-MCNC: 0.49 NG/ML (ref 0–0.04)
TROPONIN I SERPL-MCNC: 0.52 NG/ML (ref 0–0.04)
UROBILINOGEN UR QL STRIP.AUTO: 1 EU/DL (ref 0.2–1)
VENTRICULAR RATE, ECG03: 114 BPM
WBC # BLD AUTO: 13.1 K/UL (ref 4.6–13.2)
WBC # BLD AUTO: 15 K/UL (ref 4.6–13.2)
WBC # BLD AUTO: 18.9 K/UL (ref 4.6–13.2)
WBC URNS QL MICRO: ABNORMAL /HPF (ref 0–4)

## 2020-01-01 PROCEDURE — 74011250636 HC RX REV CODE- 250/636: Performed by: STUDENT IN AN ORGANIZED HEALTH CARE EDUCATION/TRAINING PROGRAM

## 2020-01-01 PROCEDURE — 93306 TTE W/DOPPLER COMPLETE: CPT | Performed by: INTERNAL MEDICINE

## 2020-01-01 PROCEDURE — 81001 URINALYSIS AUTO W/SCOPE: CPT

## 2020-01-01 PROCEDURE — 2709999900 HC NON-CHARGEABLE SUPPLY

## 2020-01-01 PROCEDURE — 99223 1ST HOSP IP/OBS HIGH 75: CPT | Performed by: INTERNAL MEDICINE

## 2020-01-01 PROCEDURE — 85025 COMPLETE CBC W/AUTO DIFF WBC: CPT

## 2020-01-01 PROCEDURE — 65270000029 HC RM PRIVATE

## 2020-01-01 PROCEDURE — 92526 ORAL FUNCTION THERAPY: CPT

## 2020-01-01 PROCEDURE — T4541 LARGE DISPOSABLE UNDERPAD: HCPCS

## 2020-01-01 PROCEDURE — 96366 THER/PROPH/DIAG IV INF ADDON: CPT

## 2020-01-01 PROCEDURE — 0651 HSPC ROUTINE HOME CARE

## 2020-01-01 PROCEDURE — 3331090004 HSPC SERVICE INTENSITY ADD-ON

## 2020-01-01 PROCEDURE — 99232 SBSQ HOSP IP/OBS MODERATE 35: CPT | Performed by: INTERNAL MEDICINE

## 2020-01-01 PROCEDURE — HOSPICE MEDICATION HC HH HOSPICE MEDICATION

## 2020-01-01 PROCEDURE — 97162 PT EVAL MOD COMPLEX 30 MIN: CPT

## 2020-01-01 PROCEDURE — 80048 BASIC METABOLIC PNL TOTAL CA: CPT

## 2020-01-01 PROCEDURE — 76450000000

## 2020-01-01 PROCEDURE — 74230 X-RAY XM SWLNG FUNCJ C+: CPT

## 2020-01-01 PROCEDURE — 99223 1ST HOSP IP/OBS HIGH 75: CPT | Performed by: NURSE PRACTITIONER

## 2020-01-01 PROCEDURE — 51798 US URINE CAPACITY MEASURE: CPT

## 2020-01-01 PROCEDURE — 92610 EVALUATE SWALLOWING FUNCTION: CPT

## 2020-01-01 PROCEDURE — 74011250636 HC RX REV CODE- 250/636: Performed by: HOSPITALIST

## 2020-01-01 PROCEDURE — 99232 SBSQ HOSP IP/OBS MODERATE 35: CPT | Performed by: HOSPITALIST

## 2020-01-01 PROCEDURE — 96365 THER/PROPH/DIAG IV INF INIT: CPT

## 2020-01-01 PROCEDURE — A9270 NON-COVERED ITEM OR SERVICE: HCPCS

## 2020-01-01 PROCEDURE — 80053 COMPREHEN METABOLIC PANEL: CPT

## 2020-01-01 PROCEDURE — 87086 URINE CULTURE/COLONY COUNT: CPT

## 2020-01-01 PROCEDURE — 72141 MRI NECK SPINE W/O DYE: CPT

## 2020-01-01 PROCEDURE — 72125 CT NECK SPINE W/O DYE: CPT

## 2020-01-01 PROCEDURE — 82550 ASSAY OF CK (CPK): CPT

## 2020-01-01 PROCEDURE — G0299 HHS/HOSPICE OF RN EA 15 MIN: HCPCS

## 2020-01-01 PROCEDURE — 74011250637 HC RX REV CODE- 250/637: Performed by: HOSPITALIST

## 2020-01-01 PROCEDURE — 71045 X-RAY EXAM CHEST 1 VIEW: CPT

## 2020-01-01 PROCEDURE — 99233 SBSQ HOSP IP/OBS HIGH 50: CPT | Performed by: NURSE PRACTITIONER

## 2020-01-01 PROCEDURE — A4927 NON-STERILE GLOVES: HCPCS

## 2020-01-01 PROCEDURE — C8929 TTE W OR WO FOL WCON,DOPPLER: HCPCS

## 2020-01-01 PROCEDURE — 36415 COLL VENOUS BLD VENIPUNCTURE: CPT

## 2020-01-01 PROCEDURE — 70450 CT HEAD/BRAIN W/O DYE: CPT

## 2020-01-01 PROCEDURE — 74011250637 HC RX REV CODE- 250/637: Performed by: INTERNAL MEDICINE

## 2020-01-01 PROCEDURE — 92611 MOTION FLUOROSCOPY/SWALLOW: CPT

## 2020-01-01 PROCEDURE — 74011250637 HC RX REV CODE- 250/637: Performed by: STUDENT IN AN ORGANIZED HEALTH CARE EDUCATION/TRAINING PROGRAM

## 2020-01-01 PROCEDURE — 74011250637 HC RX REV CODE- 250/637: Performed by: NURSE PRACTITIONER

## 2020-01-01 PROCEDURE — 99285 EMERGENCY DEPT VISIT HI MDM: CPT

## 2020-01-01 PROCEDURE — 82962 GLUCOSE BLOOD TEST: CPT

## 2020-01-01 PROCEDURE — 3336500001 HSPC ELECTION

## 2020-01-01 PROCEDURE — 99239 HOSP IP/OBS DSCHRG MGMT >30: CPT | Performed by: HOSPITALIST

## 2020-01-01 PROCEDURE — 99222 1ST HOSP IP/OBS MODERATE 55: CPT | Performed by: HOSPITALIST

## 2020-01-01 PROCEDURE — E0325 URINAL MALE JUG-TYPE: HCPCS

## 2020-01-01 PROCEDURE — 97530 THERAPEUTIC ACTIVITIES: CPT

## 2020-01-01 PROCEDURE — 77030040392 HC DRSG OPTIFOAM MDII -A

## 2020-01-01 PROCEDURE — T4522 ADULT SIZE BRIEF/DIAPER MED: HCPCS

## 2020-01-01 PROCEDURE — 86900 BLOOD TYPING SEROLOGIC ABO: CPT

## 2020-01-01 PROCEDURE — 93005 ELECTROCARDIOGRAM TRACING: CPT

## 2020-01-01 PROCEDURE — 83735 ASSAY OF MAGNESIUM: CPT

## 2020-01-01 PROCEDURE — 84484 ASSAY OF TROPONIN QUANT: CPT

## 2020-01-01 PROCEDURE — 77030011254 HC DRSG HYDRGEL S&N -A

## 2020-01-01 PROCEDURE — 77030040831 HC BAG URINE DRNG MDII -A

## 2020-01-01 PROCEDURE — 74011000255 HC RX REV CODE- 255: Performed by: HOSPITALIST

## 2020-01-01 RX ORDER — CLONIDINE HYDROCHLORIDE 0.1 MG/1
0.1 TABLET ORAL
Status: DISCONTINUED | OUTPATIENT
Start: 2020-01-01 | End: 2020-01-01 | Stop reason: HOSPADM

## 2020-01-01 RX ORDER — SODIUM CHLORIDE 0.9 % (FLUSH) 0.9 %
5-40 SYRINGE (ML) INJECTION AS NEEDED
Status: DISCONTINUED | OUTPATIENT
Start: 2020-01-01 | End: 2020-01-01 | Stop reason: HOSPADM

## 2020-01-01 RX ORDER — POLYETHYLENE GLYCOL 3350 17 G/17G
17 POWDER, FOR SOLUTION ORAL DAILY PRN
Status: DISCONTINUED | OUTPATIENT
Start: 2020-01-01 | End: 2020-01-01 | Stop reason: HOSPADM

## 2020-01-01 RX ORDER — MORPHINE SULFATE 20 MG/5ML
5 SOLUTION ORAL
Qty: 10 ML | Refills: 0 | Status: SHIPPED | OUTPATIENT
Start: 2020-01-01 | End: 2020-01-01

## 2020-01-01 RX ORDER — ONDANSETRON 2 MG/ML
4 INJECTION INTRAMUSCULAR; INTRAVENOUS
Status: DISCONTINUED | OUTPATIENT
Start: 2020-01-01 | End: 2020-01-01 | Stop reason: HOSPADM

## 2020-01-01 RX ORDER — POTASSIUM CHLORIDE 750 MG/1
40 TABLET, FILM COATED, EXTENDED RELEASE ORAL EVERY 4 HOURS
Status: COMPLETED | OUTPATIENT
Start: 2020-01-01 | End: 2020-01-01

## 2020-01-01 RX ORDER — METOPROLOL TARTRATE 25 MG/1
25 TABLET, FILM COATED ORAL EVERY 12 HOURS
Status: DISCONTINUED | OUTPATIENT
Start: 2020-01-01 | End: 2020-01-01 | Stop reason: HOSPADM

## 2020-01-01 RX ORDER — LORAZEPAM 2 MG/ML
0.5 CONCENTRATE ORAL
Qty: 10 ML | Refills: 0 | Status: SHIPPED | OUTPATIENT
Start: 2020-01-01

## 2020-01-01 RX ORDER — POTASSIUM CHLORIDE 7.45 MG/ML
10 INJECTION INTRAVENOUS
Status: COMPLETED | OUTPATIENT
Start: 2020-01-01 | End: 2020-01-01

## 2020-01-01 RX ORDER — GUAIFENESIN 100 MG/5ML
81 LIQUID (ML) ORAL DAILY
Status: DISCONTINUED | OUTPATIENT
Start: 2020-01-01 | End: 2020-01-01 | Stop reason: HOSPADM

## 2020-01-01 RX ORDER — METOPROLOL TARTRATE 25 MG/1
12.5 TABLET, FILM COATED ORAL ONCE
Status: COMPLETED | OUTPATIENT
Start: 2020-01-01 | End: 2020-01-01

## 2020-01-01 RX ORDER — DULOXETIN HYDROCHLORIDE 30 MG/1
30 CAPSULE, DELAYED RELEASE ORAL DAILY
Qty: 30 CAP | Refills: 6 | Status: SHIPPED | OUTPATIENT
Start: 2020-01-01 | End: 2020-01-01

## 2020-01-01 RX ORDER — ONDANSETRON 4 MG/1
4 TABLET, ORALLY DISINTEGRATING ORAL
Status: DISCONTINUED | OUTPATIENT
Start: 2020-01-01 | End: 2020-01-01 | Stop reason: HOSPADM

## 2020-01-01 RX ORDER — SODIUM CHLORIDE 0.9 % (FLUSH) 0.9 %
5-40 SYRINGE (ML) INJECTION EVERY 8 HOURS
Status: DISCONTINUED | OUTPATIENT
Start: 2020-01-01 | End: 2020-01-01 | Stop reason: HOSPADM

## 2020-01-01 RX ORDER — HEPARIN SODIUM 5000 [USP'U]/ML
5000 INJECTION, SOLUTION INTRAVENOUS; SUBCUTANEOUS EVERY 8 HOURS
Status: DISCONTINUED | OUTPATIENT
Start: 2020-01-01 | End: 2020-01-01

## 2020-01-01 RX ORDER — POLYETHYLENE GLYCOL 400 AND PROPYLENE GLYCOL 4; 3 MG/ML; MG/ML
SOLUTION/ DROPS OPHTHALMIC AS NEEDED
COMMUNITY
End: 2020-01-01

## 2020-01-01 RX ORDER — ACETAMINOPHEN 650 MG/1
650 SUPPOSITORY RECTAL
Status: DISCONTINUED | OUTPATIENT
Start: 2020-01-01 | End: 2020-01-01 | Stop reason: HOSPADM

## 2020-01-01 RX ORDER — MORPHINE SULFATE 20 MG/ML
10 SOLUTION ORAL
Qty: 15 ML | Refills: 0 | Status: SHIPPED | OUTPATIENT
Start: 2020-01-01 | End: 2020-01-01

## 2020-01-01 RX ORDER — GUAIFENESIN 100 MG/5ML
324 LIQUID (ML) ORAL
Status: COMPLETED | OUTPATIENT
Start: 2020-01-01 | End: 2020-01-01

## 2020-01-01 RX ORDER — ACETAMINOPHEN 325 MG/1
650 TABLET ORAL
Status: DISCONTINUED | OUTPATIENT
Start: 2020-01-01 | End: 2020-01-01 | Stop reason: HOSPADM

## 2020-01-01 RX ORDER — ATORVASTATIN CALCIUM 40 MG/1
40 TABLET, FILM COATED ORAL
Status: DISCONTINUED | OUTPATIENT
Start: 2020-01-01 | End: 2020-01-01 | Stop reason: HOSPADM

## 2020-01-01 RX ORDER — METOPROLOL TARTRATE 25 MG/1
12.5 TABLET, FILM COATED ORAL EVERY 12 HOURS
Status: DISCONTINUED | OUTPATIENT
Start: 2020-01-01 | End: 2020-01-01

## 2020-01-01 RX ORDER — GUAIFENESIN 100 MG/5ML
81 LIQUID (ML) ORAL DAILY
Qty: 30 TAB | Refills: 0 | Status: SHIPPED | OUTPATIENT
Start: 2020-01-01 | End: 2020-01-01

## 2020-01-01 RX ORDER — SCOLOPAMINE TRANSDERMAL SYSTEM 1 MG/1
1 PATCH, EXTENDED RELEASE TRANSDERMAL
Qty: 1 PATCH | Refills: 0 | Status: SHIPPED | OUTPATIENT
Start: 2020-01-01

## 2020-01-01 RX ORDER — METOPROLOL TARTRATE 25 MG/1
25 TABLET, FILM COATED ORAL EVERY 12 HOURS
Qty: 60 TAB | Refills: 0 | Status: SHIPPED | OUTPATIENT
Start: 2020-01-01 | End: 2020-01-01

## 2020-01-01 RX ORDER — POTASSIUM CHLORIDE 20 MEQ/1
40 TABLET, EXTENDED RELEASE ORAL
Status: COMPLETED | OUTPATIENT
Start: 2020-01-01 | End: 2020-01-01

## 2020-01-01 RX ADMIN — Medication 10 ML: at 13:47

## 2020-01-01 RX ADMIN — METOPROLOL TARTRATE 25 MG: 25 TABLET, FILM COATED ORAL at 09:12

## 2020-01-01 RX ADMIN — POTASSIUM CHLORIDE 40 MEQ: 750 TABLET, FILM COATED, EXTENDED RELEASE ORAL at 08:23

## 2020-01-01 RX ADMIN — BARIUM SULFATE 30 ML: 400 PASTE ORAL at 13:00

## 2020-01-01 RX ADMIN — ATORVASTATIN CALCIUM 40 MG: 40 TABLET, FILM COATED ORAL at 23:50

## 2020-01-01 RX ADMIN — PERFLUTREN 2 ML: 6.52 INJECTION, SUSPENSION INTRAVENOUS at 15:37

## 2020-01-01 RX ADMIN — ASPIRIN 81 MG CHEWABLE TABLET 81 MG: 81 TABLET CHEWABLE at 09:12

## 2020-01-01 RX ADMIN — BARIUM SULFATE 75 ML: 400 SUSPENSION ORAL at 13:00

## 2020-01-01 RX ADMIN — METOPROLOL TARTRATE 25 MG: 25 TABLET, FILM COATED ORAL at 23:50

## 2020-01-01 RX ADMIN — POTASSIUM CHLORIDE 10 MEQ: 10 INJECTION, SOLUTION INTRAVENOUS at 08:14

## 2020-01-01 RX ADMIN — Medication 10 ML: at 15:12

## 2020-01-01 RX ADMIN — METOPROLOL TARTRATE 12.5 MG: 25 TABLET, FILM COATED ORAL at 08:23

## 2020-01-01 RX ADMIN — POTASSIUM BICARBONATE 40 MEQ: 782 TABLET, EFFERVESCENT ORAL at 13:34

## 2020-01-01 RX ADMIN — Medication 10 ML: at 05:40

## 2020-01-01 RX ADMIN — ASPIRIN 81 MG CHEWABLE TABLET 324 MG: 81 TABLET CHEWABLE at 08:05

## 2020-01-01 RX ADMIN — Medication 10 ML: at 22:00

## 2020-01-01 RX ADMIN — Medication 10 ML: at 23:51

## 2020-01-01 RX ADMIN — BARIUM SULFATE 45 ML: 400 SUSPENSION ORAL at 13:00

## 2020-01-01 RX ADMIN — POTASSIUM CHLORIDE 40 MEQ: 1500 TABLET, EXTENDED RELEASE ORAL at 08:08

## 2020-01-01 RX ADMIN — METOPROLOL TARTRATE 12.5 MG: 25 TABLET, FILM COATED ORAL at 20:48

## 2020-01-01 RX ADMIN — ACETAMINOPHEN 650 MG: 325 TABLET ORAL at 17:40

## 2020-01-01 RX ADMIN — Medication 10 ML: at 17:43

## 2020-01-01 RX ADMIN — POTASSIUM CHLORIDE 40 MEQ: 750 TABLET, FILM COATED, EXTENDED RELEASE ORAL at 03:48

## 2020-01-01 RX ADMIN — Medication 10 ML: at 06:00

## 2020-01-01 RX ADMIN — METOPROLOL TARTRATE 12.5 MG: 25 TABLET, FILM COATED ORAL at 13:35

## 2020-01-01 RX ADMIN — HEPARIN SODIUM 5000 UNITS: 5000 INJECTION INTRAVENOUS; SUBCUTANEOUS at 12:40

## 2020-01-01 RX ADMIN — HEPARIN SODIUM 5000 UNITS: 5000 INJECTION INTRAVENOUS; SUBCUTANEOUS at 05:40

## 2020-01-01 RX ADMIN — HEPARIN SODIUM 5000 UNITS: 5000 INJECTION INTRAVENOUS; SUBCUTANEOUS at 13:35

## 2020-01-01 RX ADMIN — HEPARIN SODIUM 5000 UNITS: 5000 INJECTION INTRAVENOUS; SUBCUTANEOUS at 20:47

## 2020-01-01 RX ADMIN — HEPARIN SODIUM 5000 UNITS: 5000 INJECTION INTRAVENOUS; SUBCUTANEOUS at 23:49

## 2020-01-01 RX ADMIN — POTASSIUM CHLORIDE 10 MEQ: 10 INJECTION, SOLUTION INTRAVENOUS at 09:35

## 2020-01-01 RX ADMIN — SODIUM CHLORIDE 1000 ML: 900 INJECTION, SOLUTION INTRAVENOUS at 08:21

## 2020-01-01 RX ADMIN — HEPARIN SODIUM 5000 UNITS: 5000 INJECTION INTRAVENOUS; SUBCUTANEOUS at 03:55

## 2020-01-01 RX ADMIN — POTASSIUM BICARBONATE 40 MEQ: 782 TABLET, EFFERVESCENT ORAL at 16:36

## 2020-09-15 NOTE — PROGRESS NOTES
Consult Patient: Sadaf Villalba MRN: [id number]  SSN: [id number] YOB: 1929  Age: 80 y.o. Sex: male Subjective:  
  
Sadaf Villalba is a 80 y.o. white male who notes a 2-year history of a right inguinal bulge which initially was relatively asymptomatic with this time is progressively become larger and more symptomatic for which she desires to consider surgical repair the patient has a significant history of benign prostatic hypertrophy but denies obstructive GI and pulmonary symptomatology Allergies: Aspirin, mirabegron on 
Current medications see medication list 
Past medical history: 1. Hypertension 2. Hypercholesterolemia 3. Allergic rhinitis 4. Benign prostatic hypertrophy 5. History of bladder carcinoma in situ Past surgical history: 
Right carotid endarterectomy  Right rotator cuff repair/open  Social history: 
Tobacco: None Alcohol 1 ounce weekly Family history: Mother  [de-identified] Alzheimer's disease Father  68Alzheimer's disease Brother 65hypertension, Alzheimer's  Sister  age 3unknown etiology Allergies Allergen Reactions  Aspirin Other (comments) Upset stomach  Myrbetriq [Mirabegron] Rash Current Outpatient Medications Medication Sig Dispense Refill  peg 400-propylene glycol (Systane, propylene glycol,) 0.4-0.3 % drop as needed.  acetaminophen (TYLENOL PO) Take  by mouth.  tamsulosin (FLOMAX) 0.4 mg capsule Take 1 Cap by mouth daily (after dinner). 90 Cap 3  
 amLODIPine (NORVASC) 5 mg tablet  diclofenac (VOLTAREN) 1 % gel Apply  to affected area four (4) times daily. 200 g 2  
 eplerenone (INSPRA) 25 mg tablet Take  by mouth daily.  olmesartan-hydroCHLOROthiazide (BENICAR HCT) 40-12.5 mg per tablet Take 1 Tab by mouth daily.  meloxicam (MOBIC) 15 mg tablet Take 1 Tab by mouth daily. 30 Tab 0  
 erythromycin (ILOTYCIN) ophthalmic ointment  fluticasone (FLONASE) 50 mcg/actuation nasal spray  KLOR-CON 10 10 mEq tablet  valsartan-hydrochlorothiazide (DIOVAN-HCT) 320-12.5 mg per tablet  gabapentin (NEURONTIN) 100 mg capsule  CRESTOR 20 mg tablet  MULTIVIT-MINERALS/FOLIC ACID (ADULT MULTIVITAMIN GUMMIES PO) Take  by mouth. Past Medical History:  
Diagnosis Date  Bladder cancer ([facility] [address] 2014  
 bladder  CAD (coronary artery disease)  Hypertension [facility] Walker as ambulation aid Past Surgical History:  
Procedure Laterality Date  HX CAROTID ENDARTERECTOMY  HX ROTATOR CUFF REPAIR Right Social History Tobacco Use  Smoking status: Never Smoker  Smokeless tobacco: Never Used Substance Use Topics  Alcohol use: Yes Frequency: Monthly or less No family history on file. Review of Systems: 
 
General: Denies fevers, chills, night sweats, fatigue, weight loss, or weight gain. HEENT: Denies changes in auditory or visual acuity, recurrent pharyngitis, epistaxis, chronic rhinorrhea, vertigo Respiratory: Denies increasing shortness of breath, productive cough, hemoptysis Cardiac: Denies known history of cardiac disease, heart murmur, palpitations GI: Denies dysphagia, recurrent emesis, hematemesis, changes in bowel habits, hematochezia, melena : Denies hematuria frequency urgency dysuria Musculoskeletal: Denies fractures, dislocations Neurologic: Denies history of CVA, paralysis paresthesias, recurrent cephalgia, seizures Endocrine: Denies polyuria, polydipsia, polyphagia, heat and cold intolerance Lymph/heme: Denies a history of malignancy, anemia, bruising, blood transfusions Integumentary: Negative for dermatitis Objective:  
 
Vitals:  
 09/15/20 1436 BP: 134/76 Pulse: 96  
Resp: 18 Temp: 97.8 °F (36.6 °C) Weight: 50.8 kg (112 lb) Height: 5' 6\" (1.676 m) General: no acute distress, nontoxic in appearance. Head: Normocephalic, atraumatic Mouth: Clear, no overt lesions, oral mucosa is pink and moist. 
Neck: Supple, no masses, no adenopathy or carotid bruits, trachea midline Resp: Clear to auscultation bilaterally, no wheezing, rhonchi, or rales, excursions normal and symmetrical. 
Cardio: Regular rate and rhythm, no murmurs, clicks, gallops, or rubs. Abdomen: soft, nontender, nondistended, normoactive bowel sounds, easily reducible right inguinal hernia Extremities: Warm, well perfused, no tenderness or swelling, Neuro: Sensation and strength grossly intact and symmetrical. 
Psych: Alert and oriented to person, place, and time. Assessment:  
 
Symptomatic right inguinal hernia Plan:  
 
Discussed the options for management with the patient conservative versus surgical risks benefits operating versus not potential alternatives. The patient after discussion is like to pursue a laparoscopic possible open repair of right inguinal hernia utilizing mesh the risk benefits of operating postoperative alternatives and potential complications up to and including death extensive with the patient as well as his daughter both of whom voiced understanding wish to proceed. We will obtain appropriate preoperative evaluation and pursue his surgical procedure as an outpatient at [facility] with a 2-week postoperative office follow-up evaluation Signed By: Ancelmo Tan DO September 15, 2020

## 2020-09-17 NOTE — PATIENT INSTRUCTIONS
Grief (Actual/Anticipated): Care Instructions Your Care Instructions Grief is your emotional reaction to a major loss. The words \"sorrow\" and \"heartache\" often are used to describe feelings of grief. You feel grief when you lose a beloved person, pet, place, or thing. It is also natural to feel grief when you lose a valued way of life, such as a job, marriage, or good health. You may begin to grieve before a loss occurs. You may grieve for a loved one who is sick and dying. Children and adults often feel the pain of loss before a big move or divorce. This type of grief helps you get ready for a loss. Grief is different for each person. There is no \"normal\" or \"expected\" period of time for grieving. Some people adjust to their loss within a couple of months. Others may take 2 years or longer, especially if their lives were changed a lot or if the loss was sudden and shocking. Grieving can cause problems such as headaches, loss of appetite, and trouble with thinking or sleeping. You may withdraw from friends and family and behave in ways that are unusual for you. Grief may cause you to question your beliefs and views about life. Grief is natural and does not require medical treatment. But if you have trouble sleeping, it may help to take sleeping pills for a short time. It may help to talk with people who have been through or are going through similar losses. You may also want to talk to a counselor about your feelings. Talking about your loss, sharing your cares and concerns, and getting support from others are important parts of healthy grieving. Follow-up care is a key part of your treatment and safety. Be sure to make and go to all appointments, and call your doctor if you are having problems. It's also a good idea to know your test results and keep a list of the medicines you take. How can you care for yourself at home? · Get enough sleep.  Your mind helps make sense of your life while you sleep. Missing sleep can lead to illness and make it harder for you to deal with your grief. · Eat healthy foods. Try to avoid eating only foods that give you comfort. Ask someone to join you for a meal if you do not like eating alone. Consider taking a multivitamin every day. · Get some exercise every day. Even a walk can help you deal with your grief. Other exercises, such as yoga, can also help you manage stress. · Comfort yourself. Take time to look at photos or use special items that make you feel better. · Stay involved in your life. Do not withdraw from the activities you enjoy. People you know at work, [employer], clubs, or other groups can help you get through your period of grief. · Think about joining a support group to help you deal with your grief. There are many support groups to help people recover from grief. When should you call for help? Call 911 anytime you think you may need emergency care. For example, call if: 
  · You feel you cannot stop from hurting yourself or someone else. Watch closely for changes in your health, and be sure to contact your doctor if: 
  · You think you may be depressed.  
  · You do not get better as expected. Where can you learn more? Go to http://mary lou-bailey.info/ Enter H249 in the search box to learn more about \"Grief (Actual/Anticipated): Care Instructions. \" Current as of: December 9, 2019               Content Version: 12.6 © 4039-3341 [employer]. Care instructions adapted under license by [employer] (which disclaims liability or warranty for this information). If you have questions about a medical condition or this instruction, always ask your healthcare professional. [address] any warranty or liability for your use of this information.

## 2020-09-17 NOTE — PROGRESS NOTES
Brendan Doctor is a 80 y.o. male who was seen by synchronous (real-time) audio-video technology on 2020 for Depression Assessment & Plan:  
Diagnoses and all orders for this visit: 
 
1. Other depression Other orders -     DULoxetine (CYMBALTA) 30 mg capsule; Take 1 Cap by mouth daily. As above, new 
 treatment plan as listed below Orders Placed This Encounter  DULoxetine (CYMBALTA) 30 mg capsule Start cymbalta as ordered May help pain as well as help patient's mood Follow-up and Dispositions · Return in about 4 weeks (around 10/15/2020) for virtual phone, depression. This has been fully explained to the patient, who indicates understanding. [address] Subjective:  
 
Pt has a h/o neck arthritis of neck and shoulder. He has had injections; He is in pain management. His daughter is concerned about his mood; Pt is  since April . He states that he may be depressed. Has stopped taking Pt is scheduled right inguinal hernia which is to be repaired 10/7/2020. Pt has lost 10 pounds since  His wife ; He has PT / Anshu Marquez Prior to Admission medications Medication Sig Start Date End Date Taking? Authorizing Provider 20  Yes Baltazar Will MD  
acetaminophen (TYLENOL PO) Take  by mouth. Yes Provider, Historical  
fluticasone (FLONASE) 50 mcg/actuation nasal spray  17  Yes Provider, Historical  
peg 400-propylene glycol (Systane, propylene glycol,) 0.4-0.3 % drop as needed. Provider, Historical  
erythromycin (ILOTYCIN) ophthalmic ointment  17   Provider, Historical  
 
Patient Active Problem List  
 Diagnosis Date Noted  HTN (hypertension)  Hypercholesterolemia  Primary osteoarthritis of hip 2017  Pain of right hip joint 10/25/2017  Spondylosis of cervical region without myelopathy or radiculopathy 10/20/2017  Facet arthropathy, cervical 10/20/2017  Degenerative disc disease, cervical 10/20/2017  Chronic pain syndrome 10/20/2017 Allergies Allergen Reactions  Aspirin Other (comments) Upset stomach  Myrbetriq [Mirabegron] Rash Past Medical History:  
Diagnosis Date  Bladder cancer ([address] 2014  
 bladder  HTN (hypertension)  Hypercholesterolemia  Hypertension [facility] Walker as ambulation aid Past Surgical History:  
Procedure Laterality Date  HX CAROTID ENDARTERECTOMY  HX ROTATOR CUFF REPAIR Right No family history on file. Social History Tobacco Use  Smoking status: Never Smoker  Smokeless tobacco: Never Used Substance Use Topics  Alcohol use: Yes Frequency: Monthly or less Review of Systems Constitutional: Negative for chills and fever. Respiratory: Negative for shortness of breath and wheezing. Cardiovascular: Negative for chest pain and palpitations. All other systems reviewed and are negative. Objective: No flowsheet data found. General: alert, cooperative, no distress Mental  status: normal mood, behavior, speech, dress, motor activity, and thought processes, able to follow commands HENT: NCAT Neck: no visualized mass Resp: no respiratory distress Neuro: no gross deficits Skin: no discoloration or lesions of concern on visible areas Psychiatric: normal affect, consistent with stated mood, no evidence of hallucinations Additional exam findings: We discussed the expected course, resolution and complications of the diagnosis(es) in detail. Medication risks, benefits, costs, interactions, and alternatives were discussed as indicated. I advised him to contact the office if his condition worsens, changes or fails to improve as anticipated. He expressed understanding with the diagnosis(es) and plan.   
 
 
Adi Pierre, who was evaluated through a patient-initiated, synchronous (real-time) audio-video encounter, and/or his healthcare decision maker, is aware that it is a billable service, with coverage as determined by his insurance carrier. He provided verbal consent to proceed: Yes, and patient identification was verified. It was conducted pursuant to the emergency declaration under the [address] [place] [address], [address] [place] [address] authority and the [employer] and [place] community healthar General Act. A caregiver was present when appropriate. Ability to conduct physical exam was limited. I was in the office. The patient was at home.  
 
 
Josephine Diaz MD

## 2020-09-30 NOTE — TELEPHONE ENCOUNTER
----- Message from Magali Wilburn sent at 9/30/2020 10:59 AM EDT -----  Patient's daughter requesting a call from nurse in regards to medication. .. Millicent Ferrer [phone]

## 2020-10-01 NOTE — TELEPHONE ENCOUNTER
Attempted to return call however no answer. Unable to leave voicemail due to voicemail not being set up.

## 2020-10-05 PROBLEM — R55 SYNCOPE AND COLLAPSE: Status: ACTIVE | Noted: 2020-01-01
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
## 2020-10-05 PROBLEM — E87.6 HYPOKALEMIA: Status: ACTIVE | Noted: 2020-01-01

## 2020-10-05 NOTE — H&P
HISTORY & PHYSICAL Patient: Mendy Estrella MRN: [id number]  [facility]: [id number] YOB: 1929  Age: 80 y.o. Sex: male DOA: 10/5/2020 LOS:  LOS: 0 days DOA: 10/5/2020 Assessment/Plan Active Problems: Hypokalemia (10/5/2020) Fall (10/5/2020) Syncope and collapse (10/5/2020) Plan: 1. Syncope and collapse unclear etiology, fall versus patient tripped over bathroom carpet. 2. Elevated troponin/NSTEMI cardiology consulted, echocardiogram, conservative management given patient's age and comorbid conditions. 3. History of depressioncontinue Cymbalta 4. Severe malnutrition secondary to poor oral intakenutrition consult 5. Bereavement with recent loss of wife secondary to colon cancer. DVT prophylaxis Heparin DNR 
 
 
 
 
 
 
HPI:  
 
Mendy Estrella is a 80 y.o. male who presented to the emergency room secondary to syncope and collapse. Patient is a poor historian and I spoke with the patient's daughter Chrissie Speaker who mentions that she saw him lying in the bathroom this morning. Unclear if patient tripped on carpet or passed out. She mentions that the patient has been depressed and talks about dying every day. His wife recently  from colon cancer and the patient has been steadily declining. She also mentions that he has lost a lot of weight recently secondary to poor appetite. ER evaluation patient noted to have a mild elevation in troponin I 0.26cardiology consulted recommended echocardiogram and serial cardiac enzymes with conservative management given patient's age. Patient is currently being admitted to the hospital for further evaluation and treatment. We will consult palliative care for further input. Past Medical History:  
Diagnosis Date  Bladder cancer ([facility] [address]   
 bladder  HTN (hypertension)  Hypercholesterolemia  Hypertension [facility] Walker as ambulation aid Past Surgical History: Procedure Laterality Date  HX CAROTID ENDARTERECTOMY  HX ROTATOR CUFF REPAIR Right No family history on file. Social History Socioeconomic History  Marital status:  Spouse name: Not on file  Number of children: Not on file  Years of education: Not on file  Highest education level: Not on file Tobacco Use  Smoking status: Never Smoker  Smokeless tobacco: Never Used Substance and Sexual Activity  Alcohol use: Yes Frequency: Monthly or less  Drug use: No  
 Sexual activity: Never Prior to Admission medications Medication Sig Start Date End Date Taking? Authorizing Provider DULoxetine (CYMBALTA) 30 mg capsule Take 1 Cap by mouth daily. 9/17/20 Sunday MD Johanna  
peg 400-propylene glycol (Systane, propylene glycol,) 0.4-0.3 % drop as needed. Provider, Historical  
acetaminophen (TYLENOL PO) Take  by mouth. Provider, Historical  
erythromycin (ILOTYCIN) ophthalmic ointment  8/18/17   Provider, Historical  
fluticasone (FLONASE) 50 mcg/actuation nasal spray  8/18/17   Provider, Historical  
 
 
Allergies Allergen Reactions  Aspirin Other (comments) Upset stomach  Myrbetriq [Mirabegron] Rash Review of Systems Review of systems not obtained due to patient factors. Physical Exam:  
  
Visit Vitals /70 Pulse (!) 110 Temp 97.2 °F (36.2 °C) Resp 23 Ht 5' 6\" (1.676 m) SpO2 95% BMI 18.08 kg/m² Physical Exam: 
 
Gen: In general, this is a poorly nourished male in no acute distress HEENT: Sclerae nonicteric. Oral mucous membranes moist. Dentition poor Neck: Supple with midline trachea. CV: RRR without murmur or rub appreciated. Resp:Respirations are unlabored without use of accessory muscles. Lung fields B/L without wheezes or rhonchi. Abd: Soft, nontender, nondistended. Extrem: Extremities are warm, without cyanosis or clubbing. No pitting pretibial edema. Skin: Warm, no visible rashes. Neuro: Patient is alert, oriented, and cooperative. No obvious focal defects. Moves all 4 extremities. Labs Reviewed: 
 
Recent Results (from the past 24 hour(s)) CBC WITH AUTOMATED DIFF Collection Time: 10/05/20  6:40 AM  
Result Value Ref Range WBC 15.0 (H) 4.6 - 13.2 K/uL  
 RBC 4.95 4.70 - 5.50 M/uL  
 HGB 16.8 (H) 13.0 - 16.0 g/dL HCT 49.1 (H) 36.0 - 48.0 % MCV 99.2 (H) 74.0 - 97.0 FL  
 MCH 33.9 24.0 - 34.0 PG  
 MCHC 34.2 31.0 - 37.0 g/dL  
 RDW 14.3 11.6 - 14.5 % PLATELET 830 523 - 056 K/uL MPV 11.3 9.2 - 11.8 FL  
 NEUTROPHILS 87 (H) 40 - 73 % LYMPHOCYTES 8 (L) 21 - 52 % MONOCYTES 5 3 - 10 % EOSINOPHILS 0 0 - 5 % BASOPHILS 0 0 - 2 %  
 ABS. NEUTROPHILS 13.0 (H) 1.8 - 8.0 K/UL  
 ABS. LYMPHOCYTES 1.2 0.9 - 3.6 K/UL  
 ABS. MONOCYTES 0.8 0.05 - 1.2 K/UL  
 ABS. EOSINOPHILS 0.0 0.0 - 0.4 K/UL  
 ABS. BASOPHILS 0.0 0.0 - 0.1 K/UL  
 DF AUTOMATED METABOLIC PANEL, BASIC Collection Time: 10/05/20  6:40 AM  
Result Value Ref Range Sodium 138 136 - 145 mmol/L Potassium 2.9 (LL) 3.5 - 5.5 mmol/L Chloride 96 (L) 100 - 111 mmol/L  
 CO2 29 21 - 32 mmol/L Anion gap 13 3.0 - 18 mmol/L Glucose 143 (H) 74 - 99 mg/dL BUN 27 (H) 7.0 - 18 MG/DL Creatinine 1.56 (H) 0.6 - 1.3 MG/DL  
 BUN/Creatinine ratio 17 12 - 20 GFR est AA 51 (L) >60 ml/min/1.73m2 GFR est non-AA 42 (L) >60 ml/min/1.73m2 Calcium 10.4 (H) 8.5 - 10.1 MG/DL  
CARDIAC PANEL,(CK, CKMB & TROPONIN) Collection Time: 10/05/20  6:40 AM  
Result Value Ref Range CK - MB 5.4 (H) <3.6 ng/ml CK-MB Index 3.1 0.0 - 4.0 %  39 - 308 U/L Troponin-I, QT 0.24 (H) 0.0 - 0.045 NG/ML  
TYPE & SCREEN Collection Time: 10/05/20  6:40 AM  
Result Value Ref Range Crossmatch Expiration 10/08/2020 ABO/Rh(D) A POSITIVE Antibody screen NEG MAGNESIUM Collection Time: 10/05/20  6:40 AM  
Result Value Ref Range Magnesium 2.1 1.6 - 2.6 mg/dL EKG, 12 LEAD, INITIAL Collection Time: 10/05/20  7:55 AM  
Result Value Ref Range Ventricular Rate 114 BPM  
 Atrial Rate 138 BPM  
 P-R Interval 168 ms QRS Duration 116 ms  
 Q-T Interval 370 ms QTC Calculation (Bezet) 509 ms Calculated P Axis 82 degrees Calculated R Axis -47 degrees Calculated T Axis 94 degrees Diagnosis Sinus tachycardia with premature atrial complexes with aberrant conduction Left axis deviation Septal infarct (cited on or before 13-SEP-2016) Inferior infarct , age undetermined ST & T wave abnormality, consider lateral ischemia Abnormal ECG When compared with ECG of 31-MAY-2017 10:56, 
Significant changes have occurred GLUCOSE, POC Collection Time: 10/05/20  9:21 AM  
Result Value Ref Range Glucose (POC) 105 70 - 110 mg/dL Imaging Reviewed: XR Results (most recent): 
Results from Hospital Encounter encounter on 10/26/17 XR HIP RT W OR WO PELV 2-3 VWS Narrative Right hip 2 views HISTORY: Pain. COMPARISON: None FINDINGS: 
 
Two views of the right hip were obtained. Hip joints appear intact. No 
evidence for acute fractures or dislocations. Bony structures are osteoporotic. Impression IMPRESSION: 
 
No acute fracture. Bony structures are osteoporotic. Osei Arnold MD 
10/5/2020, 11:34 AM 
 
 
 
Disclaimer: Sections of this note are dictated using utilizing voice recognition software. Minor typographical errors may be present. If questions arise, please do not hesitate to contact me or call our department.

## 2020-10-05 NOTE — ED NOTES
Patient wet himself, 2 nurses cleaned the patient and removed all wet linens and applied new dry linens.

## 2020-10-05 NOTE — CONSULTS
[facility] - Consult Note Date of  Admission: 10/5/2020  6:36 AM 
  
Primary Care Physician:  Murtaza Finney MD 
 
 Plan: 1. Syncope -  Patient is confused - asking to call his wife (per notes she passed away in April. Head CT without acute findings, monitor for arrhythmias 2. Elevated troponin - Denies chest pain, 0.24, 0.42, EKG ST with incomplete LBBB; echo today -55% - manage medically with statin, BB, begin Asprin if no further emesis 3. Hypertension - Continue Metoprolol 4. Hypokalemia - Replete 5. JERICA - creat 1.5 - monitor 6. H/O prostate cancer Mild non specific troponin elevation- patient denies chest pain. Will obtain echo to assess LV function. Given advances age- will recommend medical management. Assessment:  
 
Hospital Problems  Date Reviewed: 10/15/2019 Codes Class Noted POA Hypokalemia ICD-10-CM: E87.6 ICD-9-CM: 276.8  10/5/2020 Unknown Fall ICD-10-CM: W19. Deyanne Broach ICD-9-CM: X690.2  10/5/2020 Unknown Syncope and collapse ICD-10-CM: R55 
ICD-9-CM: 780.2  10/5/2020 Unknown History of Present Illness: This patient has been seen and evaluated at the request of [facility] for syncope. This is a 80 y.o. male admitted for Fall [W19. Pinkey Frieze Hypokalemia [E87.6] Syncope and collapse [R55]. Patient is confused during my exam. HPI obtained from EMR. Per notes, he was found on floor of bathroom by his daughter. Per notes, he was possibly on floor for 2 hours. He denies chest pain, SOB, or recent fever or illness. Per EMS, had 1 episode of coffee ground emesis. Currently  denies chest pain or pressure. No palpitations. No SOB. No orthopnea. No cough. No pnd. No dizziness or lightheadedness. No fever or chills. No diaphoresis. No leg swelling. No recent syncopal episodes. No blood in stool or urine. No nausea or vomit. No recent CVA. Past Medical History:  
 
Past Medical History:  
Diagnosis Date  [facility]  
 bladder  HTN (hypertension)  Hypercholesterolemia  Hypertension Veronica Wayne Walker as ambulation aid Social History:  
 
Social History Socioeconomic History  Marital status:  Spouse name: Not on file  Number of children: Not on file  Years of education: Not on file  Highest education level: Not on file Tobacco Use  Smoking status: Never Smoker  Smokeless tobacco: Never Used Substance and Sexual Activity  Alcohol use: Yes Frequency: Monthly or less  Drug use: No  
 Sexual activity: Never Family History: No family history on file. Medications: Allergies Allergen Reactions  Aspirin Other (comments) Upset stomach  Myrbetriq [Mirabegron] Rash Current Facility-Administered Medications Medication Dose Route Frequency  sodium chloride (NS) flush 5-40 mL  5-40 mL IntraVENous Q8H  
 sodium chloride (NS) flush 5-40 mL  5-40 mL IntraVENous PRN  
 acetaminophen (TYLENOL) tablet 650 mg  650 mg Oral Q6H PRN Or  
 acetaminophen (TYLENOL) suppository 650 mg  650 mg Rectal Q6H PRN  polyethylene glycol (MIRALAX) packet 17 g  17 g Oral DAILY PRN  
 ondansetron (ZOFRAN ODT) tablet 4 mg  4 mg Oral Q8H PRN Or  
 ondansetron (ZOFRAN) injection 4 mg  4 mg IntraVENous Q6H PRN  
 heparin (porcine) injection 5,000 Units  5,000 Units SubCUTAneous Q8H  
 atorvastatin (LIPITOR) tablet 40 mg  40 mg Oral QHS  metoprolol tartrate (LOPRESSOR) tablet 12.5 mg  12.5 mg Oral Q12H Review Of Systems:  
 
Constitutional: No fever, no chills, no weight loss, no night sweats HEENT: No epistaxis, no nasal drainage, no difficulty in swallowing, no redness in eyes Respiratory:  Negative for cough, sputum, hemoptysis, pleurisy/chest pain, wheezing, dyspnea on exertion or emphysema Cardiovascular: no chest pain, no chest pressure, no dyspnea, no pnd, no claudication no palpitations, no chronic leg edema, no syncope Gastrointestinal: no abd pain, no vomiting, no diarrhea, no bleeding symptoms Genitourinary: No urinary symptoms or hematuria Integument/breast: No ulcers or rashes Musculoskeletal: no muscle pain, no weakness Neurological: + Fall, + syncope; No focal weakness, no seizures, no headaches Behvioral/Psych: No anxiety, no depression Physical Exam:  
 
Visit Vitals BP (!) 158/72 (BP 1 Location: Right arm, BP Patient Position: At rest) Pulse (!) 110 Temp 97.1 °F (36.2 °C) Resp 15 Ht 5' 6\" (1.676 m) SpO2 100% BMI 18.08 kg/m² BP Readings from Last 3 Encounters:  
10/05/20 (!) 158/72  
09/15/20 134/76  
10/15/19 110/65 Pulse Readings from Last 3 Encounters:  
10/05/20 (!) 110  
09/15/20 96  
10/15/19 75 Wt Readings from Last 3 Encounters:  
09/15/20 50.8 kg (112 lb)  
20 59 kg (130 lb)  
20 59 kg (130 lb) General:  alert, cooperative, no distress, appears stated age, confused Skin: Warm and dry, acyanotic, normal color. Head: Normocephalic, atraumatic. Eyes: Sclerae anicteric, conjunctivae without injection. Neck:  nontender, no nuchal rigidity, no masses, no stridor, no carotid bruit, no JVD Lungs:  clear to auscultation bilaterally, rhonchi , wheezes , diminished breath sounds R base, L base Heart:  regular rate and rhythm, S1, S2 normal, no murmur, click, rub or gallop Abdomen:  abdomen is soft without significant tenderness, masses, organomegaly or guarding Extremities:  extremities normal, atraumatic, no cyanosis or no edema Neurological: grossly intact. No focal abnormalities, moves all extremities well. Psychiatric Affect: The patient is awake, alert and oriented x 2. Martha Chery is interactive and appropriate. Asking to call wife - per chart she  in April. Data Review:  
 
Recent Results (from the past 48 hour(s)) CBC WITH AUTOMATED DIFF  Collection Time: 10/05/20  6:40 AM  
 Result Value Ref Range WBC 15.0 (H) 4.6 - 13.2 K/uL  
 RBC 4.95 4.70 - 5.50 M/uL  
 HGB 16.8 (H) 13.0 - 16.0 g/dL HCT 49.1 (H) 36.0 - 48.0 % MCV 99.2 (H) 74.0 - 97.0 FL  
 MCH 33.9 24.0 - 34.0 PG  
 MCHC 34.2 31.0 - 37.0 g/dL  
 RDW 14.3 11.6 - 14.5 % PLATELET 952 300 - 312 K/uL MPV 11.3 9.2 - 11.8 FL  
 NEUTROPHILS 87 (H) 40 - 73 % LYMPHOCYTES 8 (L) 21 - 52 % MONOCYTES 5 3 - 10 % EOSINOPHILS 0 0 - 5 % BASOPHILS 0 0 - 2 %  
 ABS. NEUTROPHILS 13.0 (H) 1.8 - 8.0 K/UL  
 ABS. LYMPHOCYTES 1.2 0.9 - 3.6 K/UL  
 ABS. MONOCYTES 0.8 0.05 - 1.2 K/UL  
 ABS. EOSINOPHILS 0.0 0.0 - 0.4 K/UL  
 ABS. BASOPHILS 0.0 0.0 - 0.1 K/UL  
 DF AUTOMATED METABOLIC PANEL, BASIC Collection Time: 10/05/20  6:40 AM  
Result Value Ref Range Sodium 138 136 - 145 mmol/L Potassium 2.9 (LL) 3.5 - 5.5 mmol/L Chloride 96 (L) 100 - 111 mmol/L  
 CO2 29 21 - 32 mmol/L Anion gap 13 3.0 - 18 mmol/L Glucose 143 (H) 74 - 99 mg/dL BUN 27 (H) 7.0 - 18 MG/DL Creatinine 1.56 (H) 0.6 - 1.3 MG/DL  
 BUN/Creatinine ratio 17 12 - 20 GFR est AA 51 (L) >60 ml/min/1.73m2 GFR est non-AA 42 (L) >60 ml/min/1.73m2 Calcium 10.4 (H) 8.5 - 10.1 MG/DL  
CARDIAC PANEL,(CK, CKMB & TROPONIN) Collection Time: 10/05/20  6:40 AM  
Result Value Ref Range CK - MB 5.4 (H) <3.6 ng/ml CK-MB Index 3.1 0.0 - 4.0 %  39 - 308 U/L Troponin-I, QT 0.24 (H) 0.0 - 0.045 NG/ML  
TYPE & SCREEN Collection Time: 10/05/20  6:40 AM  
Result Value Ref Range Crossmatch Expiration 10/08/2020 ABO/Rh(D) A POSITIVE Antibody screen NEG MAGNESIUM Collection Time: 10/05/20  6:40 AM  
Result Value Ref Range Magnesium 2.1 1.6 - 2.6 mg/dL EKG, 12 LEAD, INITIAL Collection Time: 10/05/20  7:55 AM  
Result Value Ref Range Ventricular Rate 114 BPM  
 Atrial Rate 138 BPM  
 P-R Interval 168 ms QRS Duration 116 ms  
 Q-T Interval 370 ms QTC Calculation (Bezet) 509 ms Calculated P Axis 82 degrees Calculated R Axis -47 degrees Calculated T Axis 94 degrees Diagnosis Sinus tachycardia with premature atrial complexes with aberrant conduction Left axis deviation Septal infarct (cited on or before 13-SEP-2016) Inferior infarct , age undetermined ST & T wave abnormality, consider lateral ischemia Abnormal ECG When compared with ECG of 31-MAY-2017 10:56, 
Significant changes have occurred GLUCOSE, POC Collection Time: 10/05/20  9:21 AM  
Result Value Ref Range Glucose (POC) 105 70 - 110 mg/dL TROPONIN I Collection Time: 10/05/20 12:25 PM  
Result Value Ref Range Troponin-I, QT 0.42 (H) 0.0 - 0.045 NG/ML No intake or output data in the 24 hours ending 10/05/20 1415 Cardiographics:  
 
ECG:  
Sinus tachycardia with premature atrial complexes with aberrant conduction Left axis deviation Septal infarct (cited on or before 13-SEP-2016) Inferior infarct , age undetermined ST & T wave abnormality, consider lateral ischemia Abnormal ECG When compared with ECG of 31-MAY-2017 10:56,  
Significant changes have occurred Echocardiogram: 10/5/2020 · Technically difficult study due to patient's heart rhythm · LV: Estimated LVEF is 50 - 55%. Visually measured ejection fraction. Normal cavity size and wall thickness. Inconclusive left ventricular diastolic function. Signed By: Timothy Perla NP supervised October 5, 2020 I have independently evaluated and examined the patient. All relevant labs and testing data's are reviewed. Care plan discussed and updated after review.  
 
Mariely Fletcher MD

## 2020-10-05 NOTE — COMMUNITY CARE MANAGEMENT
Thomas Shin from [facility] [place] called requesting a Palliative consult for patient, she stated patient is not appropriate for Home Health, they will not be taking patient back as a client.  made aware.

## 2020-10-05 NOTE — ED TRIAGE NOTES
Patient was brought in by EMS after being found face down on the ground for appx two hours in the bathroom after a fall by patients daughter. Patient is A&Ox4. Has no complaints of pain. Patient has a small skin tear on right elbow. Bruise on left cheekbone and a small pustule under left eye. Per medics patient had one round of coffee ground emesis after being helped up off the floor.

## 2020-10-05 NOTE — ROUTINE PROCESS
TRANSFER - OUT REPORT: 
 
Verbal report given to ADDY RANDALL on Lux Schuster  being transferred to [facility] for routine progression of care Report consisted of patients Situation, Background, Assessment and  
Recommendations(SBAR). Information from the following report(s) SBAR, ED Summary and MAR was reviewed with the receiving nurse. Lines:  
Peripheral IV 10/05/20 Left Antecubital (Active) Peripheral IV 10/05/20 Right Antecubital (Active) Opportunity for questions and clarification was provided. Patient transported with: 
 [employer]

## 2020-10-05 NOTE — ED PROVIDER NOTES
Mr. Lon Villeda is a 51-year-old male past medical history of hypertension and bladder cancer presents to the ED today after being found face down in the bathroom by his daughter. Per the patient he was on the ground for 2 hours. He reports he was ambulated out of the restroom when she fell. Reports no prodromal symptoms, such as tunneling of vision, lightheadedness/dizziness, chest pain/palpitations. Also reports he does not think he tripped. He has had some dizziness lately, although, he was not dizzy at the time of falling. He denies being in any pain right now. Reports no cardiac history. Patient's wife  in April, reports she is to be buried this morning. Per EMS report, patient had one episode coffee-ground emesis with she was picked up off the ground. Patient reports he had 2 episodes of emesis this morning, which were bloody/dark. He reports having 1 bloody bowel movement (bright red) approximately 1 month ago. He also reports having had some hard stools recently that have improved with a stool softener. Past Medical History:  
Diagnosis Date  Bladder cancer ([facility]   
 bladder  HTN (hypertension)  Hypercholesterolemia  Hypertension [facility] Walker as ambulation aid Past Surgical History:  
Procedure Laterality Date  HX CAROTID ENDARTERECTOMY  HX ROTATOR CUFF REPAIR Right No family history on file. Social History Socioeconomic History  Marital status:  Spouse name: Not on file  Number of children: Not on file  Years of education: Not on file  Highest education level: Not on file Occupational History  Not on file Social Needs  Financial resource strain: Not on file  Food insecurity Worry: Not on file Inability: Not on file  Transportation needs Medical: Not on file Non-medical: Not on file Tobacco Use  Smoking status: Never Smoker  Smokeless tobacco: Never Used Substance and Sexual Activity  Alcohol use: Yes Frequency: Monthly or less  Drug use: No  
 Sexual activity: Never Lifestyle  Physical activity Days per week: Not on file Minutes per session: Not on file  Stress: Not on file Relationships  Social connections Talks on phone: Not on file Gets together: Not on file Attends Advent service: Not on file Active member of club or organization: Not on file Attends meetings of clubs or organizations: Not on file Relationship status: Not on file  Intimate partner violence Fear of current or ex partner: Not on file Emotionally abused: Not on file Physically abused: Not on file Forced sexual activity: Not on file Other Topics Concern  Not on file Social History Narrative  Not on file ALLERGIES: Aspirin and Myrbetriq [mirabegron] Review of Systems Constitutional: Negative for chills and fever. HENT: Negative for congestion and sore throat. Eyes: Negative for visual disturbance. Respiratory: Negative for cough and shortness of breath. Cardiovascular: Negative for chest pain, palpitations and leg swelling. Gastrointestinal: Positive for blood in stool (bright red, 1 episode, 1 month ago) and vomiting (x2 episodes; dark vs bloody). Negative for abdominal pain, constipation and diarrhea. Genitourinary: Negative for dysuria and hematuria. Musculoskeletal: Positive for gait problem. Neurological: Positive for dizziness. Vitals:  
 10/05/20 9780 BP: 130/84 Pulse: (!) 103 Resp: 21 Temp: 97.2 °F (36.2 °C) SpO2: 98% Height: 5' 6\" (1.676 m) Physical Exam 
Vitals signs and nursing note reviewed. Constitutional:   
   General: He is not in acute distress. Appearance: He is normal weight. He is not ill-appearing, toxic-appearing or diaphoretic. HENT:  
   Head: Abrasion (left cheek bone) present. Mouth/Throat: Mouth: Mucous membranes are moist. No oral lesions. Pharynx: Oropharynx is clear. Uvula midline. Eyes:  
   General: Lids are normal.  
   Pupils: Pupils are equal, round, and reactive to light. Cardiovascular:  
   Rate and Rhythm: Tachycardia present. Rhythm irregular. Pulses: Normal pulses. Heart sounds: Normal heart sounds. No murmur. Pulmonary:  
   Effort: Pulmonary effort is normal.  
   Breath sounds: Normal breath sounds. No wheezing or rales. Abdominal:  
   General: Abdomen is flat. Bowel sounds are normal.  
   Palpations: Abdomen is soft. Tenderness: There is no abdominal tenderness. Musculoskeletal:  
   Right lower leg: No edema. Left lower leg: No edema. Neurological:  
   Mental Status: He is alert and oriented to person, place, and time. Cranial Nerves: Cranial nerves are intact. Sensory: Sensation is intact. MDM Number of Diagnoses or Management Options Fall, initial encounter: Hypokalemia:  
Multifocal atrial tachycardia determined by electrocardiography [facility]):  
Diagnosis management comments: 66-year-old male presenting after being found down in the bathroom x2 hours by his daughter, with 2 episodes of emesis (questionable coffee-ground versus bloody). DDX at this time includes but is not limited to: UGIB, arrhythmia, vasovagal, orthostasis, CVA. CBC, BMP, cardiac panel, EKG, CT head/neck ordered. 7:33 AM: Potassium of 2.9. Will replete with 40 oral and 20 IV. Troponin of 0.24. EKG already ordered. Will trend troponins moving forward. Patient reports aspirin intolerance, reported reaction is \"upset stomach\". Will give full dose aspirin. Results came while patient was down at CT. Patient does not have any historical cardiac marker result review. CK-MB is elevated, although CK index within normal limits. 8:09 AM: Troponin 0 CBC shows elevated white count 15, hemoglobin of 16.8. The rest of the metabolic panel shows elevated creatinine. Based on patient's tachycardia, elevated white blood cells, fluid bolus ordered. CT of the head shows no acute changes, with bilateral lacunar infarcts in the basal ganglia and internal capsule noted. 8:30 AM: EKG obtained, shows tachycardia to 113 with polyformic P waves concerning for multifocal atrial tachycardia. Left axis deviation. NM interval 0.168, QTC prolonged at 509. 
9:24 AM: Patient reevaluated, noted to be tremulous. He reports this is new. POC glucose 105. Reports he feels weak and tired compared to baseline. 9:34 AM: Discussed admission with patient, who understands had is comfortable. Will call hospitalist to discuss, given patient's fall without LOC, hypokalemia, EKG findings. 10:10 AM: Spoke with Dr. Latoya Wilkins, hospitalist, who agrees to admit the patient. Requests we speak with cardiology regarding plan moving forward. Will place page to cardiology. Telemetry bed orders placed. 11:02 AM: Spoke with cardiologist, who reports he will see the patient this afternoon. No indication for heparin drip at this time, given that patient is chest pain-free. Procedures

## 2020-10-05 NOTE — ED NOTES
Patient seen by me as well. He is a 51-year-old gentleman who fell down and was found by family on the ground in the bathroom. Patient does not know why he fell but he does remember falling. Thinks he might of gotten knocked out for a second or 2. Currently with no headache minimal neck pain no abdominal pain no chest pain no shortness of breath no nausea no vomiting minimal hip pain but flexes his legs without any difficulty passively. Per EMS patient had 1 run of coffee-ground emesis. He is noted to have a mildly tachycardic and irregular rhythm. There are some distinct P waves but overall rhythm could be a sinus arrhythmia. Unclear if patient had any palpitations prior to or after the syncopal event. CBC is fairly unremarkable mild elevation in his white count 15 shift but no bands. Chemistry shows potassium of 2.9 which we will replete and check a magnesium as well. He does have a mild JERICA up to 1.5 from his baseline of approximately 1 Troponin 0.24 without any priors EKG shows sinus tach with a rate of 114 with a incomplete left bundle at 116 QTC of 509 no ST elevation or depression no hypertrophy. Certainly may be multifocal atrial tachycardia I do see 3 distinct P waves in the rhythm strip for lead II however it does appear there is predominantly 2 atrial complexes

## 2020-10-06 NOTE — PROGRESS NOTES
conducted an initial consultation and Spiritual Assessment for Viry Greenberg, who is a 80 y.o.,male. Patient's Primary Language is: [place]. According to the patient's EMR Yarsanism Affiliation is: Other. The reason the Patient came to the hospital is:  
Patient Active Problem List  
 Diagnosis Date Noted  Hypokalemia 10/05/2020  Fall 10/05/2020  Syncope and collapse 10/05/2020  
 HTN (hypertension)  Hypercholesterolemia  Primary osteoarthritis of hip 11/08/2017  Pain of right hip joint 10/25/2017  Spondylosis of cervical region without myelopathy or radiculopathy 10/20/2017  Facet arthropathy, cervical 10/20/2017  Degenerative disc disease, cervical 10/20/2017  Chronic pain syndrome 10/20/2017 The  provided the following Interventions: 
Initiated a relationship of care and support. Listened empathetically. The  visited in the early evening. In response to different topics, the patient answered by talking about horses that he and his family had owned adding  \"they keep you busy. \" Provided chaplaincy education. Offered assurance of continued prayers on patient's behalf. Patient declined prayer at bedside. Chart reviewed. The following outcomes where achieved: It was reported from Yoselyn López in [address] that the patient is [employer], but the patient declined membership in [employer]. Patient was unable to process feelings about current hospitalization. Patient expressed gratitude for 's visit. Assessment: 
Patient does not have any known Judaism needs that will affect patient's preferences in health care. However, Yoselyn López from [address] [facility] reported the patient may want to see a [employer]  for communion and a  for José Miguel Leach. Plan: 
Chaplains will continue to follow and will provide follow up  pastoral care.   recommends bedside caregivers page  on duty if patient shows signs of acute spiritual or emotional distress. Arielle. Naz, Farzad Ochoa. Div Spiritual Care  
[phone]

## 2020-10-06 NOTE — PROGRESS NOTES
Comprehensive Nutrition Assessment Type and Reason for Visit: Initial, RD nutrition re-screen/LOS(BMI) Nutrition Recommendations/Plan: - Add supplement: Ensure Enlive BID 
- Monitor po intake and diet tolerance - Awaiting SLP evaluation; recently consulted Nutrition Assessment:  Pt mumbling, unable to answer questions appropriately. Poor po intake this morning per RN; seemed to cough a little with food intake per report and SLP was consulted. Tolerated fluids better. Limited NFPE due to confused/ not cooperating. Significant wt loss PTA per chart hx. K low; being replaced. Palliative consulted for plan of care goals. Malnutrition Assessment: 
Malnutrition Status:  Severe malnutrition Context:  Chronic illness Findings of the 6 clinical characteristics of malnutrition:  
Energy Intake:  Unable to assess Weight Loss:  7.0 - Greater than 7.5% over 3 months Body Fat Loss:  Unable to assess, Muscle Mass Loss:  7 - Severe muscle mass loss, Calf (gastrocnemius), Hand (interosseous) Fluid Accumulation:  Unable to assess,   
 Strength:  Not performed Nutrition History and Allergies: past medical hx: bladder cancer, HTN, hypercholesterolemia. Wt loss of 18 lb, 13.8% x 1.5 month PTA per chart hx. No known food allergies Estimated Daily Nutrient Needs: 
Energy (kcal):  3723-9494 Protein (g):  51-61 Fluid (ml/day):  1 mL/kcal 
 
Nutrition Related Findings:  BM 10/4. No edema. Wounds:   
(noted some small scabs upon pt assessment on 10/6) Current Nutrition Therapies: DIET CARDIAC Regular DIET NUTRITIONAL SUPPLEMENTS Breakfast, Dinner; Ensure [employer] Anthropometric Measures: 
· Height:  5' 6\" (167.6 cm) · Current Body Wt:  50.8 kg (111 lb 15.9 oz) · Admission Body Wt:  112 lb · Ideal Body Wt:  142 lbs:  78.9 % · BMI Category:  Underweight (BMI less than 22) age over 72 Nutrition Diagnosis: · Severe malnutrition related to (predicted prolonged inadequate energy intake) as evidenced by weight loss 7.5% in 3 months, severe muscle loss Nutrition Interventions:  
Food and/or Nutrient Delivery: Continue current diet, Mineral supplement, Start oral nutrition supplement Nutrition Education and Counseling: No recommendations at this time(confused during visit) Coordination of Nutrition Care: Continued inpatient monitoring, Coordination of community care(pt discussed with RN) Goals: 
PO nutrition intake will meet >75% of patient's estimated nutrition needs within the next 7 days Nutrition Monitoring and Evaluation:  
Food/Nutrient Intake Outcomes: Diet advancement/tolerance, Food and nutrient intake, Supplement intake, Vitamin/mineral intake Physical Signs/Symptoms Outcomes: Biochemical data, Chewing or swallowing, Meal time behavior, Nutrition focused physical findings, Skin, Weight Discharge Planning:   
Continue oral nutrition supplement, Continue current diet Electronically signed by Raman Gonzalez RD on 10/6/2020 at 11:42 AM 
 
Contact: [phone]

## 2020-10-06 NOTE — ACP (ADVANCE CARE PLANNING)
Advance Care Planning Advance Care Planning Clinical Specialist 
Conversation Note Date of ACP Conversation: 10/06/20 Conversation Conducted with: Catrina Schaffer (Daughter) ACP Clinical Specialist: Shay Jama, [address] [place] [address] N Decision Maker: NO 
 
Current Designated Health Care Decision Maker: N/A. Patient's next of kin is his daughter: Catrina Schaffer, [phone] and [phone]. This writer is unable to complete an ACP, due to patient's confusion. Care Preferences Hospitalization: \"If your health worsens and it becomes clear that your chance of recovery is unlikely, what would your preference be regarding hospitalization? \" Choice: 
[x]  The patient wants hospitalization 
[]  The patient prefers comfort-focused treatment without hospitalization. [] Yes  [] No   Educated Patient / Pablo Weiner regarding differences between Advance Directives and portable DNR orders. Length of ACP Conversation in minutes:   
 
Conversation Outcomes: 
[x] ACP discussion completed, but unable to complete due to patient's confusion. [] Existing advance directive reviewed with patient; no changes to patient's previously recorded wishes 
 [] New Advance Directive completed 
 [] Portable Do Not Resuscitate prepared for Provider review and signature 
[] POLST/POST/MOLST/MOST prepared for Provider review and signature Follow-up plan:   
[] Schedule follow-up conversation to continue planning 
[] Referred individual to Provider for additional questions/concerns  
[] Advised patient/agent/surrogate to review completed ACP document and update if needed with changes in condition, patient preferences or care setting  
 
[] This note routed to one or more involved healthcare providers Tanisha Ngo. [facility] Care Manager Pager#: [phone]

## 2020-10-06 NOTE — PROGRESS NOTES
[facility] 
 
 
CARDIOLOGY PROGRESS NOTE 
RECS: 
 
 
1. Syncope -  Patient is confused - Head CT without acute findings. Will continue to monitor for any significant arrhythmias 2. Elevated troponin - Denies chest pain, 0.24, 0.42, EKG ST with incomplete LBBB; Echo  -55% - manage medically with statin, BB, asa. 
3. Hypertension - Continue Metoprolol 4. Hypokalemia -on K+  replacement 5. JERICA - creat 1.5 on admission- renal indices improved. 6. H/O prostate cancer 7. Advanced age 8. Dysphagia swallowing eval pending  
  
 
Continue supportive care. will recommend medical management due to patient's advanced age. EF normal. 
Will f/u peripherally Call us as needed ASSESSMENT: 
Hospital Problems  Date Reviewed: 10/15/2019 Codes Class Noted POA Hypokalemia ICD-10-CM: E87.6 ICD-9-CM: 276.8  10/5/2020 Unknown Fall ICD-10-CM: W19. Araceli Francisco ICD-9-CM: M793.5  10/5/2020 Unknown Syncope and collapse ICD-10-CM: R55 
ICD-9-CM: 780.2  10/5/2020 Unknown SUBJECTIVE: 
Very confused and weak OBJECTIVE: 
 
VS:  
Visit Vitals BP (!) 160/92 (BP 1 Location: Right arm, BP Patient Position: At rest) Pulse 88 Temp 98.4 °F (36.9 °C) Resp 19 Ht 5' 6\" (1.676 m) Wt 50.8 kg (112 lb) SpO2 97% BMI 18.08 kg/m² Intake/Output Summary (Last 24 hours) at 10/6/2020 1240 Last data filed at 10/6/2020 1137 Gross per 24 hour Intake 240 ml Output 190 ml Net 50 ml  
 
TELE: normal sinus rhythm. Frequent PVC's General: alert, chronically ill and cachectic HENT: Normocephalic, atraumatic. Normal external eye. Neck :  no bruit, no JVD Cardiac:  regular rate and rhythm, no click, no rub Lungs: rales R base, L base Abdomen: Soft, nontender, no masses Extremities:  No c/c/e, peripheral pulses present Labs: Results:  
   
Chemistry Recent Labs 10/06/20 
0157 10/05/20 
7811 GLU 90 143*  138  
K 3.1* 2.9*  
 96*  
 CO2 30 29 BUN 23* 27* CREA 0.90 1.56* CA 9.8 10.4* AGAP 11 13 BUCR 26* 17  
AP 66  --   
TP 6.7  --   
ALB 2.8*  --   
GLOB 3.9  --   
AGRAT 0.7*  --   
  
CBC w/Diff Recent Labs 10/06/20 
0157 10/05/20 
9881 WBC 13.1 15.0*  
RBC 4.61* 4.95  
HGB 15.8 16.8*  
HCT 45.8 49.1*  
 241 GRANS 82* 87* LYMPH 11* 8* EOS 0 0 Cardiac Enzymes Recent Labs 10/05/20 
5530  CKND1 3.1 Coagulation No results for input(s): PTP, INR, APTT, INREXT in the last 72 hours. Lipid Panel No results found for: CHOL, CHOLPOCT, CHOLX, CHLST, CHOLV, 056624, HDL, HDLP, LDL, LDLC, DLDLP, 576123, VLDLC, VLDL, TGLX, TRIGL, TRIGP, TGLPOCT, CHHD, CHHDX  
BNP No results for input(s): BNPP in the last 72 hours. Liver Enzymes Recent Labs 10/06/20 
0157 TP 6.7 ALB 2.8* AP 66 Thyroid Studies No results found for: T4, T3U, TSH, TSHEXT [address] NP-C Gloria:[phone] supervised I have independently evaluated and examined the patient. All relevant labs and testing data's are reviewed. Care plan discussed and updated after review.  
 
Fletcher Peabody MD

## 2020-10-06 NOTE — PROGRESS NOTES
Problem: Pressure Injury - Risk of 
Goal: *Prevention of pressure injury Description: Document Ricky Scale and appropriate interventions in the flowsheet. Outcome: Progressing Towards Goal 
Note: Pressure Injury Interventions: 
Sensory Interventions: Assess changes in LOC Moisture Interventions: Absorbent underpads, Apply protective barrier, creams and emollients Activity Interventions: Pressure redistribution bed/mattress(bed type) Mobility Interventions: Pressure redistribution bed/mattress (bed type) Nutrition Interventions: Document food/fluid/supplement intake Friction and Shear Interventions: Apply protective barrier, creams and emollients, Minimize layers Problem: Patient Education: Go to Patient Education Activity Goal: Patient/Family Education Outcome: Progressing Towards Goal 
  
Problem: Falls - Risk of 
Goal: *Absence of Falls Description: Document Keyanna Bark Fall Risk and appropriate interventions in the flowsheet. Outcome: Progressing Towards Goal 
Note: Fall Risk Interventions: 
Mobility Interventions: Bed/chair exit alarm Mentation Interventions: Adequate sleep, hydration, pain control, Bed/chair exit alarm, Door open when patient unattended Elimination Interventions: Bed/chair exit alarm, Call light in reach History of Falls Interventions: Bed/chair exit alarm, Consult care management for discharge planning, Door open when patient unattended Problem: Patient Education: Go to Patient Education Activity Goal: Patient/Family Education Outcome: Progressing Towards Goal 
  
Problem: Pain Goal: *Control of Pain Outcome: Progressing Towards Goal 
  
Problem: Patient Education: Go to Patient Education Activity Goal: Patient/Family Education Outcome: Progressing Towards Goal

## 2020-10-06 NOTE — PROGRESS NOTES
Paged Dr. Zapata Seals about a 7 beat run of vtach. Instructed to ensure metabolic panel is drawn with morning labs.

## 2020-10-06 NOTE — PROGRESS NOTES
Problem: Dysphagia (Adult) Goal: *Acute Goals and Plan of Care (Insert Text) Description: Patient will: 1. Tolerate PO trials with 0 s/s overt distress in 4/5 trials 2. Utilize compensatory swallow strategies/maneuvers (decrease bite/sip, size/rate, alt. liq/sol) with min cues in 4/5 trials 3. Perform oral-motor/laryngeal exercises to increase oropharyngeal swallow function with min cues 4. Complete an objective swallow study (i.e., MBSS) to assess swallow integrity, r/o aspiration, and determine of safest LRD, min A as indicated/ordered by MD  
 
Recommend:  
NPO; meds in puree Strict aspiration precautions (HOB >30 degrees at all times, Oral care TID) MBS to further assess oropharyngeal swallow integrity and rule out aspiration Outcome: Progressing Towards Goal 
  
SPEECH LANGUAGE PATHOLOGY BEDSIDE SWALLOW EVALUATION/TREATMENT Patient: Lyubov Meyers (83 y.o. male) Date: 10/6/2020 Primary Diagnosis: Fall [W19. Evorn Declo Hypokalemia [E87.6] Syncope and collapse [R55] Precautions: Aspiration PLOF: As per H&P 
 
ASSESSMENT : 
Based on the objective data described below, the patient presents with mod oral and suspected mod-severe pharyngeal dysphagia. Per RN, pt with coughing during meal.  Noted poor intake prior to admission. Pt alert but confused. Voice breathy with decreased vocal intensity resulting in reduced speech intelligibility. Oral mech exam unremarkable. Pt demo immediate wet/weak cough on trials of thin liquid, NTL and HTL. Demo delayed a-p transit, swallow delay and decreased laryngeal elevation to palpation. Pt demo improved tolerance of puree; tolerating x5 presentations with no overt s/sx aspiration. Recommend NPO except for meds in puree with MBS later this date to further assess oropharyngeal swallow integrity and rule out silent aspiration. Discussed with pt, RN and MD.  Will follow for further dysphagia management. Patient will benefit from skilled intervention to address the above impairments. Patient's rehabilitation potential is considered to be Fair Factors which may influence rehabilitation potential include:  
[]            None noted [x]            Mental ability/status [x]            Medical condition []            Home/family situation and support systems [x]            Safety awareness 
[]            Pain tolerance/management []            Other: PLAN : 
Recommendations and Planned Interventions: As above Frequency/Duration: Patient will be followed by speech-language pathology 1-2 times per day/4-7 days per week to address goals. Discharge Recommendations: To Be Determined SUBJECTIVE:  
Patient stated What's your name again?  OBJECTIVE:  
 
Past Medical History:  
Diagnosis Date Bladder cancer [facility]) 2014  
 bladder HTN (hypertension) Hypercholesterolemia Hypertension Walker as ambulation aid Past Surgical History:  
Procedure Laterality Date HX CAROTID ENDARTERECTOMY HX ROTATOR CUFF REPAIR Right Prior Level of Function/Home Situation: 
Home Situation Home Environment: Private residence # Steps to Enter: (Pt bedroom is on the second floor, per patient' statement) One/Two Story Residence: Two story # of Interior Steps: (12) Height of Each Step (in): (7 inches) Interior Rails: Right Lift Chair Available: No 
Living Alone: No 
Support Systems: Child(drake), Family member(s), Spouse/Significant Other/Partner Patient Expects to be Discharged to[de-identified] Other (comment)(To be determined) Current DME Used/Available at Home: Walker, rolling Diet prior to admission: Unknown; noted poor intake Current Diet: Regular diet with thin liquids; recommend change to NPO except for meds crushed in puree Cognitive and Communication Status: 
Neurologic State: Alert, Confused, Eyes open spontaneously Orientation Level: Disoriented to place, Disoriented to situation, Disoriented to time, Oriented to person Cognition: Follows commands Oral Assessment: 
Oral Assessment Labial: No impairment Dentition: Natural 
Oral Hygiene: Fair Lingual: Decreased strength Velum: No impairment Mandible: No impairment P.O. Trials: 
Patient Position: 45 at [facility] Vocal quality prior to P.O.: Breathy;Low volume Consistency Presented: Honey thick liquid; Nectar thick liquid; Thin liquid;Puree How Presented: SLP-fed/presented;Self-fed/presented;Cup/sip;Spoon Bolus Acceptance: No impairment Bolus Formation/Control: Impaired Type of Impairment: Delayed;Poor;Posterior Propulsion: Discoordination;Delayed (# of seconds) Oral Residue: Less than 10% of bolus; Lingual 
Initiation of Swallow: Delayed (# of seconds) Laryngeal Elevation: Decreased;Weak PAIN: 
Start of Eval: 0 End of Eval: 0 After treatment:  
[]            Patient left in no apparent distress sitting up in chair 
[x]            Patient left in no apparent distress in bed 
[x]            Call bell left within reach [x]            Nursing notified []            Family present 
[]            Caregiver present 
[]            Bed alarm activated COMMUNICATION/EDUCATION:  
[x]            Aspiration precautions; swallow safety; compensatory techniques. []            Patient/family have participated as able in goal setting and plan of care. [x]            Patient/family agree to work toward stated goals and plan of care. []            Patient understands intent and goals of therapy; neutral about participation. []            Patient unable to participate in goal setting/plan of care; educ ongoing with interdisciplinary staff 
[]         Posted safety precautions in patient's room. Thank you for this referral, Tim Peña M.S., CCC-SLP Speech-Language Pathologist

## 2020-10-06 NOTE — PROGRESS NOTES
Problem: Pressure Injury - Risk of 
Goal: *Prevention of pressure injury Description: Document Ricky Scale and appropriate interventions in the flowsheet. 10/6/2020 1404 by Bess Goldberg, RN Outcome: Resolved/Met Note: Pressure Injury Interventions: 
Sensory Interventions: Assess changes in LOC Moisture Interventions: Absorbent underpads, Apply protective barrier, creams and emollients Activity Interventions: Pressure redistribution bed/mattress(bed type) Mobility Interventions: Pressure redistribution bed/mattress (bed type) Nutrition Interventions: Document food/fluid/supplement intake Friction and Shear Interventions: Apply protective barrier, creams and emollients, Minimize layers 10/6/2020 0848 by Bess Goldberg, RN Outcome: Progressing Towards Goal 
Note: Pressure Injury Interventions: 
Sensory Interventions: Assess changes in LOC Moisture Interventions: Absorbent underpads, Apply protective barrier, creams and emollients Activity Interventions: Pressure redistribution bed/mattress(bed type) Mobility Interventions: Pressure redistribution bed/mattress (bed type) Nutrition Interventions: Document food/fluid/supplement intake Friction and Shear Interventions: Apply protective barrier, creams and emollients, Minimize layers Problem: Patient Education: Go to Patient Education Activity Goal: Patient/Family Education 10/6/2020 1404 by Bess Goldberg, RN Outcome: Resolved/Met 
10/6/2020 0848 by Bess Goldberg, RN Outcome: Progressing Towards Goal 
  
Problem: Falls - Risk of 
Goal: *Absence of Falls Description: Document Mark Prado Fall Risk and appropriate interventions in the flowsheet. 10/6/2020 1404 by Bess Goldberg, RN Outcome: Resolved/Met Note: Fall Risk Interventions: 
Mobility Interventions: Bed/chair exit alarm Mentation Interventions: Adequate sleep, hydration, pain control, Bed/chair exit alarm, Door open when patient unattended Elimination Interventions: Bed/chair exit alarm, Call light in reach History of Falls Interventions: Bed/chair exit alarm, Consult care management for discharge planning, Door open when patient unattended 10/6/2020 0848 by Valencia Travis RN Outcome: Progressing Towards Goal 
Note: Fall Risk Interventions: 
Mobility Interventions: Bed/chair exit alarm Mentation Interventions: Adequate sleep, hydration, pain control, Bed/chair exit alarm, Door open when patient unattended Elimination Interventions: Bed/chair exit alarm, Call light in reach History of Falls Interventions: Bed/chair exit alarm, Consult care management for discharge planning, Door open when patient unattended Problem: Patient Education: Go to Patient Education Activity Goal: Patient/Family Education 10/6/2020 1404 by Valencia Travis RN Outcome: Resolved/Met 
10/6/2020 0848 by Valencia Travis RN Outcome: Progressing Towards Goal 
  
Problem: Pain Goal: *Control of Pain 10/6/2020 1404 by Valencia Travis RN Outcome: Resolved/Met 
10/6/2020 0848 by Valencia Travis RN Outcome: Progressing Towards Goal 
  
Problem: Patient Education: Go to Patient Education Activity Goal: Patient/Family Education 10/6/2020 1404 by Valencia Travis RN Outcome: Resolved/Met 
10/6/2020 0848 by Valencia Travis RN Outcome: Progressing Towards Goal 
  
Problem: Nutrition Deficit Goal: *Optimize nutritional status Outcome: Resolved/Met Problem: Patient Education: Go to Patient Education Activity Goal: Patient/Family Education Outcome: Resolved/Met

## 2020-10-06 NOTE — PROGRESS NOTES
Bedside shift change report given to Cecilio Paul RN (oncoming nurse) by Inocencio Gonsalez RN (offgoing nurse). Report included the following information SBAR, Kardex and MAR.

## 2020-10-06 NOTE — PROGRESS NOTES
Reason for Admission:  Fall [W19. Carolyn Laurel Hypokalemia [E87.6] Syncope and collapse [R55] RUR Score:    11% Plan for utilizing home health:   Patient has no home health orders, in place, at this time. This writer will continue to monitor for discharge orders. He is active with [facility]. Likelihood of Readmission:   LOW Transition of Care Plan:  Patient will discharge home with help from his family and resume his home health services [facility]). Patient's daughter will transport him home, upon discharge. Initial assessment completed with patient's daughter Sam Lopez. Cognitive status of patient: Alert but very confused. Face sheet information confirmed:  yes. Patient's daughter Sam Stock) will participate in his discharge plan and to receive any needed information. This patient lives in a [address] [facility], with his daughter aSm Lopez. His room is on the 1st floor. Patient is not able to navigate steps as needed. Prior to hospitalization, patient was considered to be independent with ADLs/IADLS : no . If not independent,  patient needs assist with : ADLs and IADLs. Patient has a current ACP document on file: no. This writer was unable to complete an ACP, due to patient's confusion. Patient's daughter Sam Stock) will be available to transport patient home upon discharge. The patient already has a walker, a lift chair, an adjustable bed and a shower chair, available in the home. Patient is currently active with home health. If active, agency name is [facility]. Patient has not stayed in a skilled nursing facility or rehab. This patient is on dialysis :no 
 
Freedom of choice signed: yes, for [facility].   
 
Currently, the discharge plan is for patient to return home with help from his family and restarting his home health services ([facility]). Patient's daughter will transport him home, upon discharge. Patient's daughter/next of kin is Keith Deleon, XY#[phone] and DD#[phone]). Patient's PCP is Dr. Don Levine. Patient is insured through Medicare A[employer] and he also has . The patient states that he can obtain his medications from the [facility]), and take his medications as directed. This writer will continue to monitor for discharge planning to ensure a safe discharge home from [facility]. Care Management Interventions PCP Verified by CM: Yes(Dr. Don Levine) Mode of Transport at Discharge: Other (see comment)(Daughter will transport home.) Transition of Care Consult (CM Consult): Discharge Planning Current Support Network: Relative's Home(Lives with his daughter) Confirm Follow Up Transport: Family Discharge Location Discharge Placement: Home with family assistance Tanisha Urrutia. MS Care Manager Pager#: [phone]

## 2020-10-06 NOTE — PROGRESS NOTES
Problem: Mobility Impaired (Adult and Pediatric) Goal: *Acute Goals and Plan of Care (Insert Text) Description: Physical Therapy Goals Initiated 10/6/2020 and to be accomplished within 7 day(s) 1. Patient will move from supine to sit and sit to supine  in bed with minimal assistance/contact guard assist.   
2.  Patient will transfer from bed to chair and chair to bed with minimal assistance/contact guard assist using the least restrictive device. 3.  Patient will perform sit to stand with minimal assistance/contact guard assist. 
4.  Patient will ambulate with minimal assistance/contact guard assist for 15 feet with the least restrictive device. Prior Level of Function:  
Patient is poor historian and oriented to person only. Stated that he used a cane \"sometimes\" and a walker \"sometimes\". Patient found on floor of bathroom by daughter and was working with home health PTA. Outcome: Progressing Towards Goal 
 PHYSICAL THERAPY EVALUATION Patient: Crystal Mahajan (82 y.o. male) Date: 10/6/2020 Primary Diagnosis: Fall [W19. Luis Enrique Patel Hypokalemia [E87.6] Syncope and collapse [R55] Precautions:  Fall, Skin PLOF: unknown as patient is poor historian. Appears to have been working with home health PTA. ASSESSMENT : 
Based on the objective data described below, the patient presents with confusion, generalized weakness, deconditioning, impaired bed mobility, impaired sitting balance, and impaired transfers. Nurse cleared patient for participation in skilled physical therapy. Patient received reclined in bed, awake and alert, agreeable to physical therapy treatment. Patient on room air, +telemetry, +condom catheter. Vitals at rest: HR 78 bpm, SpO2 96%, /110 mmHg. Oriented to person only. Re-oriented by PT to time, place, and situation. Speech is hard to discern at times with slight stutter and trembling voice. Endorsed pain \"sometimes\" on right shoulder. Follows commands with verbal and visual cues. Demonstrated grossly 3/5 BLE strength. Session interrupted by  dry cough for several minutes. Required encouragement and moderate assistance to complete supine to sit transfer to edge of bed. CGA to min assist for sitting balance at edge of bed. /88 mmHg in sitting. Patient unable to tolerate more than 2-3 minutes sitting and requested to lie back down. Unable to perform standing this date as patient as fatigued and needed moderate assist to return to reclined in bed. Required moderate assist and max verbal and tactile cues for scooting up in bed. Patient then started performing single knee to chest exercises without being instructed to do so. Patient re-oriented to situation again and was calm in bed, bed alarm on, call bell in reach, in no apparent distress, side rails up for safety, vitals stable, HR 74 bpm. Patient educated on using call bell for nursing assistance. Patient will benefit from skilled intervention to address the above impairments. Patient's rehabilitation potential is considered to be Fair Factors which may influence rehabilitation potential include:  
[]         None noted 
[x]         Mental ability/status [x]         Medical condition 
[x]         Home/family situation and support systems 
[x]         Safety awareness 
[]         Pain tolerance/management 
[]         Other: PLAN : 
Recommendations and Planned Interventions:  
[x]           Bed Mobility Training             [x]    Neuromuscular Re-Education 
[x]           Transfer Training                   []    Orthotic/Prosthetic Training 
[x]           Gait Training                          []    Modalities [x]           Therapeutic Exercises           []    Edema Management/Control 
[x]           Therapeutic Activities            [x]    Family Training/Education 
[x]           Patient Education 
[]           Other (comment): 
 
 Frequency/Duration: Patient will be followed by physical therapy 1-2 times per day/4-7 days per week to address goals. Discharge Recommendations: Azael Brady Further Equipment Recommendations for Discharge: bedside commode, rolling walker, possibly wheelchair SUBJECTIVE:  
Patient stated We've met before. Sometimes I use a cane. Sometimes I use a walker. How is work going? This darn cough. I'm progressing.  OBJECTIVE DATA SUMMARY:  
 
Past Medical History:  
Diagnosis Date Bladder cancer [facility]) 2014  
 bladder HTN (hypertension) Hypercholesterolemia Hypertension Walker as ambulation aid Past Surgical History:  
Procedure Laterality Date HX CAROTID ENDARTERECTOMY HX ROTATOR CUFF REPAIR Right Barriers to Learning/Limitations: yes;  altered mental status (i.e.Sedation, Confusion) Compensate with: Visual Cues, Verbal Cues, Tactile Cues, and Kinesthetic Cues Home Situation: 
Home Situation Home Environment: Private residence # Steps to Enter: (Pt bedroom is on the second floor, per patient' statement) One/Two Story Residence: Two story # of Interior Steps: (12) Height of Each Step (in): (7 inches) Interior Rails: Right Lift Chair Available: No 
Living Alone: No 
Support Systems: Family member(s) Patient Expects to be Discharged to[de-identified] Unknown Current DME Used/Available at Home: Walker, rolling Critical Behavior: 
Neurologic State: Alert;Confused; Eyes open to stimulus Orientation Level: Oriented to person;Disoriented to place; Disoriented to situation;Disoriented to time Cognition: Follows commands; Impaired decision making;Memory loss Safety/Judgement: Decreased awareness of need for assistance;Decreased awareness of environment;Decreased awareness of need for safety;Decreased insight into deficits; Fall prevention Psychosocial 
Patient Behaviors: Calm;Confused; Cooperative Needs Expressed: Educational 
Purposeful Interaction: Yes 
 Pt Identified Daily Priority: Clinical issues (comment) Mitch Process: Teaching/learning; Attend basic human needs Caring Interventions: Reassure Reassure: Caring rounds Skin Condition/Temp: Dry;Flaky;Fragile Skin Integrity: Tear(right elbow) Skin Integumentary Skin Color: Ecchymosis (comment) Skin Condition/Temp: Dry;Flaky;Fragile Skin Integrity: Tear(right elbow) Turgor: Epidermis thin w/ loss of subcut tissue Hair Growth: Present Varicosities: Absent Nails: Within Defined Limits Strength:   
Strength: Generally decreased, functional(BLE) Tone & Sensation:  
Tone: Normal 
    
Range Of Motion: 
AROM: Within functional limits(BLE) Functional Mobility: 
Bed Mobility: 
Rolling: Moderate assistance Supine to Sit: Moderate assistance Sit to Supine: Moderate assistance Scooting: Maximum assistance Balance:  
Sitting: Impaired; Without support Sitting - Static: Good (unsupported) Sitting - Dynamic: Fair (occasional) Therapeutic Exercises:  
Single knee to chest alternating x 5 Pain: 
Pain level pre-treatment: unable to rate due to cognition Pain level post-treatment: unable to rate due to cognition - in no visible distress Pain Intervention(s) : Medication (see MAR); Rest, Repositioning Response to intervention: Nurse notified, See doc flow Activity Tolerance:  
fair Please refer to the flowsheet for vital signs taken during this treatment. After treatment:  
[]         Patient left in no apparent distress sitting up in chair 
[x]         Patient left in no apparent distress in bed 
[x]         Call bell left within reach [x]         Nursing notified 
[]         Caregiver present [x]         Bed alarm activated 
[]         SCDs applied COMMUNICATION/EDUCATION:  
[x]         Role of Physical Therapy in the acute care setting. [x]         Fall prevention education was provided and the patient/caregiver indicated understanding. [x]         Patient/family have participated as able in goal setting and plan of care. []         Patient/family agree to work toward stated goals and plan of care. []         Patient understands intent and goals of therapy, but is neutral about his/her participation. []         Patient is unable to participate in goal setting/plan of care: ongoing with therapy staff. 
[]         Other: Thank you for this referral. 
Wander Qureshi, PT, DPT Time Calculation: 23 mins Eval Complexity: History: MEDIUM  Complexity : 1-2 comorbidities / personal factors will impact the outcome/ POC Exam:MEDIUM Complexity : 3 Standardized tests and measures addressing body structure, function, activity limitation and / or participation in recreation  Presentation: MEDIUM Complexity : Evolving with changing characteristics  Clinical Decision Making:Medium Complexity strength, bed mobility, monitoring of vitals, activity tolerance  Overall Complexity:MEDIUM

## 2020-10-06 NOTE — CONSULTS
Palliative Medicine Consult DR. OLGUIN'[facility]: [phone] ([phone]) [facility]: [phone] [facility]: [phone] Patient Name: Jeanine Alarcon YOB: 1929 Date of Initial Consult: 10/6/2020 Reason for Consult: goals of care Requesting Provider: Dr Cristóbal Lara Primary Care Physician: Kaylee Monroe MD 
  
 SUMMARY:  
Jeanine Alarcon is a 80y.o. year old with a past history of bladder cancer, hypertension, recent loss if his wife , who was admitted on 10/5/2020 from home with a diagnosis of a fall  Current medical issues leading to Palliative Medicine involvement include: 80year old male who has been declining in health. Increased weakness, poor appetite. Now with recent falls. Palliative medicine is consulted for support and care decisions. PALLIATIVE DIAGNOSES:  
1. Goals of care 2. Syncope ( fall) 3. Acute metabolic encephalopathy 4. Debility PLAN:  
1. Goals of care see at bedside. Mr López Mckee is alert confused. Speech is a bit hard to understand. Attentive to examiner. Denies pain or shortness of breath. No AMD on file but his wife is  and he has one child Renato Morales. Called to [facility] she has been well updated on Mr López Mckee. Tells me his wife ( her mother) passed in April. He has been declining since then. Uses walker at home but day of fall he did not use. She has seen more confusion but much worse past several weeks. Noted speech recommendations with MBS tomorrow. Appreciate their involvement. Goals of care affirmed DNR/DNI [facility] stated her Dad would not wish for feeding tubes for any reason but would like to have MBS to eval for safest texture. At this point he looks very debilitated to me. Will await for MBS ? Hospice. All discussed with his daughter [facility]. Goals of care DNR/DNI limited interventions no feeding tubes. 2. Syncope vs fall d/t trip. CT of head -. 3. Acute metabolic encephalopathy U/A with few bacteria, culture pending, increased confusion over past several weeks per daughter 4. Debility lives with his daughter Teddy Acevedo on first floor. Uses rollator for ambulation, w/c for long distance. Daughter performs IADL's.  
5. Initial consult note routed to primary continuity provider 6. Communicated plan of care with: Palliative IDT, daughter Teddy Acevedo , speech, attending Patient/Health Care Proxy Stated Goals: Prolong life TREATMENT PREFERENCES:  
Code Status: DNR/ DNI Advance Care Planning: 
[] [facility] Interdisciplinary Team has updated the ACP Navigator with [address] and Patient Capacity Primary Decision Maker [facility] Agent): Supplemental (Other) Decision Maker: Tresa Tidwell - Daughter - [phone] Artificially Administered Nutrition: No feeding tube Other: As far as possible, the palliative care team has discussed with patient / health care proxy about goals of care / treatment preferences for patient. HISTORY:  
 
History obtained from: chart and daughter CHIEF COMPLAINT: syncope/ fall HPI/SUBJECTIVE: The patient is:  
[] Verbal and participatory [x] Non-participatory due to: confusion Please see summary Clinical Pain Assessment (nonverbal scale for nonverbal patients): Clinical Pain Assessment Severity: 0 Duration: for how long has pt been experiencing pain (e.g., 2 days, 1 month, years) Frequency: how often pain is an issue (e.g., several times per day, once every few days, constant) FUNCTIONAL ASSESSMENT:  
 
Palliative Performance Scale (PPS): PPS: 40 ECOG 
ECOG Status : Limited self-care PSYCHOSOCIAL/SPIRITUAL SCREENING:  
  
Any spiritual / Quaker concerns: consulted spiritual care  
[x] Yes /  [] No 
 
Caregiver Burnout: 
[] Yes /  [x] No /  [] No Caregiver Present Anticipatory grief assessment: recent loss of his wife  
[] Normal  / [x] Maladaptive REVIEW OF SYSTEMS:  
 
Positive and pertinent negative findings in ROS are noted above in HPI. The following systems were [x] reviewed / [] unable to be reviewed as noted in HPI limited due to confusion Other findings are noted below. Systems: constitutional, ears/nose/mouth/throat, respiratory, gastrointestinal, genitourinary, musculoskeletal, integumentary, neurologic, psychiatric, endocrine. Positive findings noted below. Modified ESAS Completed by: provider Fatigue: 5 Drowsiness: 0 Depression: 2 Pain: 0 Anxiety: 0 Nausea: 0 Anorexia: 5 Dyspnea: 0 PHYSICAL EXAM:  
 
Wt Readings from Last 3 Encounters:  
10/05/20 50.8 kg (112 lb)  
09/15/20 50.8 kg (112 lb)  
08/20/20 59 kg (130 lb) Blood pressure (!) 160/92, pulse 88, temperature 98.4 °F (36.9 °C), resp. rate 19, height 5' 6\" (1.676 m), weight 50.8 kg (112 lb), SpO2 97 %. Pain: 
Pain Scale 1: Numeric (0 - 10) Pain Intensity 1: 0 Pain Onset 1: After fall(after fall) Pain Location 1: Shoulder Pain Orientation 1: Right Pain Description 1: Aching, Sore Pain Intervention(s) 1: Position, Relaxation technique Last bowel movement: none recorded Constitutional: elderly gentleman who was reclining in bed alert verbal in NAD Eyes: pupils equal, anicteric Respiratory: breathing not labored, cough noted with thin liquids Skin: warm, dry Neurologic: alert oriented to himself verbal  
 
 
 HISTORY:  
 
Active Problems: Hypokalemia (10/5/2020) Fall (10/5/2020) Syncope and collapse (10/5/2020) Past Medical History:  
Diagnosis Date  Bladder cancer ([facility] [address] 2014  
 bladder  HTN (hypertension)  Hypercholesterolemia  Hypertension [facility] Walker as ambulation aid Past Surgical History:  
Procedure Laterality Date  HX CAROTID ENDARTERECTOMY  HX ROTATOR CUFF REPAIR Right No family history on file. History reviewed, no pertinent family history. Social History Tobacco Use  
  Smoking status: Never Smoker  Smokeless tobacco: Never Used Substance Use Topics  Alcohol use: Yes Frequency: Monthly or less Allergies Allergen Reactions  Aspirin Other (comments) Upset stomach  Myrbetriq [Mirabegron] Rash Current Facility-Administered Medications Medication Dose Route Frequency  cloNIDine HCL (CATAPRES) tablet 0.1 mg  0.1 mg Oral Q6H PRN  potassium bicarb-citric acid (EFFER-K) tablet 40 mEq  40 mEq Oral Q4H  
 metoprolol tartrate (LOPRESSOR) tablet 25 mg  25 mg Oral Q12H  
 sodium chloride (NS) flush 5-40 mL  5-40 mL IntraVENous Q8H  
 sodium chloride (NS) flush 5-40 mL  5-40 mL IntraVENous PRN  
 acetaminophen (TYLENOL) tablet 650 mg  650 mg Oral Q6H PRN Or  
 acetaminophen (TYLENOL) suppository 650 mg  650 mg Rectal Q6H PRN  polyethylene glycol (MIRALAX) packet 17 g  17 g Oral DAILY PRN  
 ondansetron (ZOFRAN ODT) tablet 4 mg  4 mg Oral Q8H PRN Or  
 ondansetron (ZOFRAN) injection 4 mg  4 mg IntraVENous Q6H PRN  
 heparin (porcine) injection 5,000 Units  5,000 Units SubCUTAneous Q8H  
 atorvastatin (LIPITOR) tablet 40 mg  40 mg Oral QHS  
 
 
 LAB AND IMAGING FINDINGS:  
 
Lab Results Component Value Date/Time WBC 13.1 10/06/2020 01:57 AM  
 HGB 15.8 10/06/2020 01:57 AM  
 PLATELET 632 84/73/9689 01:57 AM  
 
Lab Results Component Value Date/Time Sodium 142 10/06/2020 01:57 AM  
 Potassium 3.1 (L) 10/06/2020 01:57 AM  
 Chloride 101 10/06/2020 01:57 AM  
 CO2 30 10/06/2020 01:57 AM  
 BUN 23 (H) 10/06/2020 01:57 AM  
 Creatinine 0.90 10/06/2020 01:57 AM  
 Calcium 9.8 10/06/2020 01:57 AM  
 Magnesium 2.1 10/05/2020 06:40 AM  
  
Lab Results Component Value Date/Time Alk.  phosphatase 66 10/06/2020 01:57 AM  
 Protein, total 6.7 10/06/2020 01:57 AM  
 Albumin 2.8 (L) 10/06/2020 01:57 AM  
 Globulin 3.9 10/06/2020 01:57 AM  
 
No results found for: INR, PTMR, PTP, PT1, PT2, APTT, INREXT, INREXT  
 No results found for: IRON, FE, TIBC, IBCT, PSAT, FERR No results found for: PH, PCO2, PO2 No components found for: [place] Lab Results Component Value Date/Time  10/05/2020 06:40 AM  
 CK - MB 5.4 (H) 10/05/2020 06:40 AM  
  
 
   
 
Total time: 70 minutes Counseling / coordination time, spent as noted  above: 60 minutes  
> 50% counseling / coordination: yes with patient, attending, daughter Didi Turk Prolonged service was provided for  []30 min   []75 min in face to face time in the presence of the patient, spent as noted above. Time Start:  
Time End:  
Note: this can only be billed with 52572 (initial) or 61797 (follow up). If multiple start / stop times, list each separately.

## 2020-10-06 NOTE — PROGRESS NOTES
Problem: Dysphagia (Adult) Goal: *Acute Goals and Plan of Care (Insert Text) Description: Patient will: 1. Tolerate PO trials with 0 s/s overt distress in 4/5 trials 2. Utilize compensatory swallow strategies/maneuvers (decrease bite/sip, size/rate, alt. liq/sol) with min cues in 4/5 trials 3. Perform oral-motor/laryngeal exercises to increase oropharyngeal swallow function with min cues 4. Complete an objective swallow study (i.e., MBSS) to assess swallow integrity, r/o aspiration, and determine of safest LRD, min A as indicated/ordered by MD-met 10/6/20 Recommend:  
NPO; consider comfort feeds or alternative nutrition/hydration source Strict aspiration precautions-HOB >30 degrees at all times; Oral care TID  
10/6/2020 1409 by ELVI Prado Outcome: Progressing Towards Goal 
 
SPEECH PATHOLOGY MODIFIED BARIUM SWALLOW STUDY & TREATMENT Patient: Crystal Mahajan (42 y.o. male) Date: 10/6/2020 Primary Diagnosis: Fall [W19. Luis Enrique Patel Hypokalemia [E87.6] Syncope and collapse [R55] Precautions: Aspiration ASSESSMENT : 
Based on the objective data described below, the patient presents with mod oral and moderately severe pharyngeal dysphagia characterized by silent laryngeal penetration during and after the swallow with all consistencies presented (NTL, HTL and pudding). Deficits include significantly delayed/discoordinated a-p transit with tongue pumping, decreased base of tongue strength with premature spillage, decreased laryngeal elevation/adduction/sensation and slowed epiglottic tilt. Pt also with decreased pharyngeal strength/motility with reduced opening of upper esophageal sphincter resulting in mod pharyngeal residuals that were eventually penetrated. Pt unable to follow commands for attempts at compensatory strategies. Tsp presentations were not effective in improving tolerance.   Pt at high risk of aspiration with subsequent aspiration pneumonia, dehydration and malnutrition with oral intake. Recommend continue NPO status with comfort feeds vs alternative nutrition/hydration source. TREATMENT : 
Treatment provided post diagnostic testing including oropharyngeal anatomy/physiology, MBS results, diet recs, aspiration risk/precautions. Pt verbalized understanding; however, question level of comprehension/carryover. Will follow as indicated for further dysphagia management. Discussed with MD.  
 
Patient will benefit from skilled intervention to address the above impairments. Patient's rehabilitation potential is considered to be Fair Factors which may influence rehabilitation potential include:  
[]              None noted [x]              Mental ability/status [x]              Medical condition []              Home/family situation and support systems [x]              Safety awareness []              Pain tolerance/management 
[]              Other: PLAN : 
Recommendations and Planned Interventions: As above Frequency/Duration: Patient will be followed by speech-language pathology 1-2 times per day/4-7 days per week to address goals. Discharge Recommendations: To Be Determined SUBJECTIVE:  
Patient stated I'm tired. OBJECTIVE:  
 
Past Medical History:  
Diagnosis Date  Bladder cancer ([facility] [address] 2014  
 bladder  HTN (hypertension)  Hypercholesterolemia  Hypertension [facility] Walker as ambulation aid Past Surgical History:  
Procedure Laterality Date  HX CAROTID ENDARTERECTOMY  HX ROTATOR CUFF REPAIR Right Prior Level of Function/Home Situation: 
Home Situation Home Environment: Private residence # Steps to Enter: (Pt bedroom is on the second floor, per patient' statement) One/Two Story Residence: Two story # of Interior Steps: (12) Height of Each Step (in): (7 inches) Interior Rails: Right Lift Chair Available: No 
Living Alone: No 
 Support Systems: Child(drake), Family member(s), Spouse/Significant Other/Partner Patient Expects to be Discharged to[de-identified] Other (comment)(To be determined) Current DME Used/Available at Home: Walker, bruno Diet prior to admission: Unknown; poor intake prior to admission per chart review Current Diet:  NPO Radiologist:   
Film Views: Fluoro;Lateral 
Patient Position: 90 in chair Trial 1:  
Consistency Presented: Nectar thick liquid;Honey thick liquid;Pudding How Presented: SLP-fed/presented;Self-fed/presented;Cup/sip;Spoon; Successive swallows Bolus Acceptance: No impairment Bolus Formation/Control: Impaired: Delayed;Poor;Posterior;Mastication;Premature spillage Propulsion: Discoordination;Delayed (# of seconds) Oral Residue: Lingual  
Initiation of Swallow: Triggered at vallecula Timing: Pooling 6-10 sec;Vallecular Penetration: After swallow;From residual;To laryngeal vestibule; During swallow Aspiration/Timing: No evidence of aspiration Pharyngeal Clearance: Pyriform residue ;Vallecular residue;10-50% Attempted Modifications: Small sips and bites; Alternate liquids/solids; Spoon Effective Modifications: None Cues for Modifications: Maximal  
   
 
Decreased Tongue Base Retraction?: Yes Laryngeal Elevation: Inadequate epiglottic inversion; Incomplete laryngeal closure Aspiration/Penetration Score: 5 (Penetration/Visible residue-Contrast contacts the folds, but is not ejected) Pharyngeal Symmetry: Not assessed Pharyngeal-Esophageal Segment: Decreased relaxation of upper esophageal segment Pharyngeal Dysfunction: Crico-pharyngeal dysfunction;Decreased strength;Decreased tongue base retraction;Decreased elevation/closure;Decreased pharyngeal wall constriction Oral Phase Severity: Moderate Pharyngeal Phase Severity: Moderately severe 8-point Penetration-Aspiration Scale: Score 5 PAIN: 
Pt reports 0/10 pain or discomfort prior to MBS. Pt reports 0/10 pain or discomfort post MBS. COMMUNICATION/EDUCATION:  
[x]  Patient educated regarding MBS results and diet recommendations. []  Patient/family have participated as able in goal setting and plan of care. [x]  Patient/family agree to work toward stated goals and plan of care. []  Patient understands intent and goals of therapy, but is neutral about his/her participation. []  Patient is unable to participate in goal setting and plan of care. Thank you for this referral, Kenya Thomas M.S., CCC-SLP Speech-Language Pathologist 
 
Addendum:  Discussed with Raymond Sánchez NP. Per discussion with dtr; no alternative nutrition/hydration; Ok to start comfort feeds. Recommend puree/thin liquids for comfort. Will order

## 2020-10-06 NOTE — PROGRESS NOTES
Problem: Pressure Injury - Risk of 
Goal: *Prevention of pressure injury Description: Document Ricky Scale and appropriate interventions in the flowsheet. Outcome: Progressing Towards Goal 
Note: Pressure Injury Interventions: 
Sensory Interventions: Assess changes in LOC Moisture Interventions: Absorbent underpads Activity Interventions: Pressure redistribution bed/mattress(bed type) Mobility Interventions: Pressure redistribution bed/mattress (bed type) Nutrition Interventions: Document food/fluid/supplement intake Friction and Shear Interventions: Minimize layers Problem: Falls - Risk of 
Goal: *Absence of Falls Description: Document Anayeli Watkins Fall Risk and appropriate interventions in the flowsheet. Outcome: Progressing Towards Goal 
Note: Fall Risk Interventions: 
Mobility Interventions: Bed/chair exit alarm Mentation Interventions: Adequate sleep, hydration, pain control, Bed/chair exit alarm, Door open when patient unattended Elimination Interventions: Call light in reach History of Falls Interventions: Bed/chair exit alarm, Consult care management for discharge planning, Door open when patient unattended Problem: Pain Goal: *Control of Pain Outcome: Progressing Towards Goal

## 2020-10-06 NOTE — ROUTINE PROCESS
Bedside shift change report given to Kian Leon RN (oncoming nurse) by Timothy Roman RN (offgoing nurse). Report included the following information SBAR, Kardex, MAR and Recent Results.

## 2020-10-06 NOTE — PROGRESS NOTES
Progress Note Patient: Cortez Bland MRN: [id number]  CSN: [id number] YOB: 1929  Age: 80 y.o. Sex: male DOA: 10/5/2020 LOS:  LOS: 1 day Assessment/Plan 1. Syncope and collapse; fall unclear etiology, fall versus patient tripped over bathroom carpet. CT head negative. CT cervical spine negative, multilevel degenerative findings, atherosclerosis. 2. Elevated troponin/NSTEMI cardiology following- appreciate input. Echo 10/5 with EF 50-55%. Recommend conservative management given patient's age and comorbid conditions. Pt denies chest pain- continue BB, statin, ASA if no further emesis per cardiology notes. Report of emesis in ED note- CBC stable. 3. Sepsis? - Leukocytosis on admission, WBC 15- due to fall? Monitor CBC. UA 10/6 with few bacteria, urine culture pending. Afebrile. Pt a/o to self- daughter states she's noticed more confusion/forgetfulness over time. 4. Acute metabolic encephalopathy- possibly dementia? Now with dysphagia- failed MBS. Spoke with daughter and she is ok with comfort feeds- does not wish to have PEG tube. Urine culture pending. 5. Acute kidney injury- likely due to dehydration- pt with decreased oral intake at home per notes- creatinine down to 0.9 from 1.56. Continue to monitor 6. History of depressioncontinue Cymbalta 7. Severe malnutrition secondary to poor oral intakenutrition consult, ST following 8. Bereavement with recent loss of wife secondary to colon cancer 9. Palliative Care consult- pt was with [facility]- per review of case management note- pt is not appropriate for home health and will not be taking pt back as a client. Appreciate assistance from Palliative Care. I did speak with daughter and stated if he is declining she would want him to be comfortable but would want him home- so she would need any resources available. 10. Hx Bladder CA - monitor for s/s of urinary retention 11. PT/OT eval and treat DNR 
  
Contact: Daughter Jodi Meeter [phone] // [phone]. I spoke with daughter and updated on plan of care. States she has noticed pt has been more confused, forgetful. She is aware of the palliative care consult- states if he is declining, she would want him comfortable at home- not a facility- so she would need resources for home. She is open to having a conversation with palliative care. Pt failed MBS- Does not wish to have PEG tube- ok with comfort feeds. Case discussed with:  [x]Patient  [x]Family  [x]Nursing  [x]Case Management DVT Prophylaxis:  []Lovenox  [x]Hep SQ  [x]SCDs  []Coumadin   []On Heparin gtt Sandi Cadena, NP-C 
[facility] 
pager [phone] Subjective: 
 
Pt seen and examined. He is resting in bed. A/o to self. Answers simple questions. Chief Complaint:  
Chief Complaint Patient presents with  Fall Unable to perform accurate ROS however pt denies chest pain, SOB, abdominal pain. Objective:  
 
Physical Exam: 
Visit Vitals BP (!) 160/92 (BP 1 Location: Right arm, BP Patient Position: At rest) Pulse 88 Temp 98.4 °F (36.9 °C) Resp 19 Ht 5' 6\" (1.676 m) Wt 50.8 kg (112 lb) SpO2 97% BMI 18.08 kg/m² General:         Alert, cooperative, no acute distress. Frail  male. HEENT: NC, Atraumatic. PERRLA, anicteric sclerae. Lungs: CTA Bilaterally. No Wheezing/Rhonchi/Rales. Heart:  Regular  rhythm,  No murmur, No Rubs, No Gallops Abdomen: Soft, Non distended, Non tender. +Bowel sounds, no HSM Extremities: No c/c/e. Scattered ecchymosis BUE and BLE. Mepilex to right elbow. Psych:   Good insight. Not anxious or agitated. Neurologic:  CN 2-12 grossly intact, Alert and oriented x 1. No acute neurological       
                         Deficits Intake and Output: 
Current Shift:  10/06 0701 - 10/06 1900 In: 240 [P.O.:240] Out: 90 [Urine:90] Last three shifts:  10/04 1901 - 10/06 0700 In: -  
Out: 100 [Urine:100] Labs: Results:  
   
Chemistry Recent Labs 10/06/20 
0157 10/05/20 
3504 GLU 90 143*  138  
K 3.1* 2.9*  
 96* CO2 30 29 BUN 23* 27* CREA 0.90 1.56* CA 9.8 10.4* AGAP 11 13 BUCR 26* 17  
AP 66  --   
TP 6.7  --   
ALB 2.8*  --   
GLOB 3.9  --   
AGRAT 0.7*  --   
  
CBC w/Diff Recent Labs 10/06/20 
0157 10/05/20 
5285 WBC 13.1 15.0*  
RBC 4.61* 4.95  
HGB 15.8 16.8*  
HCT 45.8 49.1*  
 241 GRANS 82* 87* LYMPH 11* 8* EOS 0 0 Cardiac Enzymes Recent Labs 10/05/20 
1088  CKND1 3.1 Coagulation No results for input(s): PTP, INR, APTT, INREXT, INREXT in the last 72 hours. Lipid Panel No results found for: CHOL, CHOLPOCT, CHOLX, CHLST, CHOLV, 756036, HDL, HDLP, LDL, LDLC, DLDLP, 643793, VLDLC, VLDL, TGLX, TRIGL, TRIGP, TGLPOCT, CHHD, CHHDX  
BNP No results for input(s): BNPP in the last 72 hours. Liver Enzymes Recent Labs 10/06/20 
0157 TP 6.7 ALB 2.8* AP 66 Thyroid Studies No results found for: T4, T3U, TSH, TSHEXT, TSHEXT Procedures/imaging: see electronic medical records for all procedures/Xrays and details which were not copied into this note but were reviewed prior to creation of Plan Medications:  
Current Facility-Administered Medications Medication Dose Route Frequency  cloNIDine HCL (CATAPRES) tablet 0.1 mg  0.1 mg Oral Q6H PRN  potassium bicarb-citric acid (EFFER-K) tablet 40 mEq  40 mEq Oral Q4H  
 metoprolol tartrate (LOPRESSOR) tablet 25 mg  25 mg Oral Q12H  
 sodium chloride (NS) flush 5-40 mL  5-40 mL IntraVENous Q8H  
 sodium chloride (NS) flush 5-40 mL  5-40 mL IntraVENous PRN  
 acetaminophen (TYLENOL) tablet 650 mg  650 mg Oral Q6H PRN Or  
 acetaminophen (TYLENOL) suppository 650 mg  650 mg Rectal Q6H PRN  polyethylene glycol (MIRALAX) packet 17 g  17 g Oral DAILY PRN  
  ondansetron (ZOFRAN ODT) tablet 4 mg  4 mg Oral Q8H PRN  Or  
 ondansetron (ZOFRAN) injection 4 mg  4 mg IntraVENous Q6H PRN  
 heparin (porcine) injection 5,000 Units  5,000 Units SubCUTAneous Q8H  
 atorvastatin (LIPITOR) tablet 40 mg  40 mg Oral QHS  
 
 
 
10/6/2020 9:09 AM

## 2020-10-06 NOTE — TELEPHONE ENCOUNTER
Deisi Vaughan from [facility] The patient was admitted to [facility] yesterday for fall and low potassium.

## 2020-10-07 NOTE — PROGRESS NOTES
Problem: Dysphagia (Adult) Goal: *Acute Goals and Plan of Care (Insert Text) Description: Patient will: 1. Tolerate PO trials with 0 s/s overt distress in 4/5 trials-n/a 
2. Utilize compensatory swallow strategies/maneuvers (decrease bite/sip, size/rate, alt. liq/sol) with min cues in 4/5 trials-n/a 3. Perform oral-motor/laryngeal exercises to increase oropharyngeal swallow function with min cues-n/a 4. Complete an objective swallow study (i.e., MBSS) to assess swallow integrity, r/o aspiration, and determine of safest LRD, min A as indicated/ordered by MD-met 10/6/20 Recommend:  
Comfort diet-puree/thin liquids Further diet liberalization as pt tolerates when transitioned home with hospice Aspiration precautions-upright for all intake; small bites/sips, slow rate of intake Oral care post meals Outcome: Resolved/Not Met SPEECH LANGUAGE PATHOLOGY DYSPHAGIA TREATMENT/DISCHARGE Patient: Kahty Moyer (57 y.o. male) Date: 10/7/2020 Diagnosis: Fall [W19. Metro Velasquez Hypokalemia [E87.6] Syncope and collapse [R55] Precautions: Aspiration, Fall, Skin ASSESSMENT: 
Pt seen for follow up dysphagia treatment with palliative care present during tx. Pt has been transitioned to comfort feeds with hospice consult in place. No po provided from meal tray due to drowsiness. Oral care performed via toothette with pt tolerating with no overt distress s/sx. Discussed with pt's dtr via telephone regarding results of MBS, aspiration risk/precautions, oral care and positioning for increased comfort during intake. Pt's dtr able to verbalize understanding. Will sign off as pt transitioning to hospice care/no further skilled SLP needs identified. Progression toward goals: 
[]         Improving appropriately and progressing toward goals 
[]         Improving slowly and progressing toward goals [x]         Not making progress toward goals and plan of care will be adjusted PLAN: 
 Recommendations and Planned Interventions: No further skilled intervention warranted at this time. Will sign off. Discharge Recommendations:  Home with hospice SUBJECTIVE:  
Patient stated No. OBJECTIVE:  
Cognitive and Communication Status: 
Neurologic State: Alert Orientation Level: Disoriented to person Cognition: Follows commands Perception: Appears intact Perseveration: No perseveration noted Safety/Judgement: Decreased awareness of need for assistance, Decreased awareness of environment, Decreased awareness of need for safety, Decreased insight into deficits, Fall prevention Dysphagia Treatment: 
Oral Assessment: 
Oral Assessment Labial: No impairment Dentition: Natural 
Oral Hygiene: Fair Lingual: Decreased strength Velum: No impairment Mandible: No impairment P.O. Trials: 
 None presented PAIN: 
Pt reports 0/10 pain or discomfort prior to tx. Pt reports 0/10 pain or discomfort post tx. After treatment:  
[]              Patient left in no apparent distress sitting up in chair 
[x]              Patient left in no apparent distress in bed 
[x]              Call bell left within reach [x]              Nursing notified 
[]              Caregiver present 
[]              Bed alarm activated COMMUNICATION/EDUCATION:  
[x]              SLP educated pt with regard to diet recs, aspiration precautions, oral care and positioning Kenya Thomas M.S., CCC-SLP Speech-Language Pathologist

## 2020-10-07 NOTE — PROGRESS NOTES
Problem: Pressure Injury - Risk of 
Goal: *Prevention of pressure injury Description: Document Ricky Scale and appropriate interventions in the flowsheet. Outcome: Progressing Towards Goal 
Note: Pressure Injury Interventions: 
Sensory Interventions: Assess changes in LOC Moisture Interventions: Absorbent underpads, Apply protective barrier, creams and emollients Activity Interventions: Pressure redistribution bed/mattress(bed type) Mobility Interventions: Pressure redistribution bed/mattress (bed type) Nutrition Interventions: Document food/fluid/supplement intake Friction and Shear Interventions: Apply protective barrier, creams and emollients, Minimize layers

## 2020-10-07 NOTE — PROGRESS NOTES
Progress Note Patient: Clair Gamino MRN: [id number]  CSN: [id number] YOB: 1929  Age: 80 y.o. Sex: male DOA: 10/5/2020 LOS:  LOS: 2 days Assessment/Plan 1. Syncope and collapse; fall unclear etiology, fall versus patient tripped over bathroom carpet. CT head negative. CT cervical spine negative, multilevel degenerative findings, atherosclerosis. 2. Elevated troponin/NSTEMI cardiology following- appreciate input. Echo 10/5 with EF 50-55%. Recommend conservative management given patient's age and comorbid conditions. Pt denies chest pain- continue BB, statin, ASA if no further emesis per cardiology notes. Report of emesis in ED note- CBC stable. 3. Sepsis? - Leukocytosis on admission, WBC 15- due to fall? Monitor CBC. UA 10/6 with few bacteria, urine culture pending. Afebrile. Pt a/o to self- daughter states she's noticed more confusion/forgetfulness over time. 4. Acute metabolic encephalopathy- possibly dementia? Now with dysphagia- failed MBS. Spoke with daughter and she is ok with comfort feeds- does not wish to have PEG tube. Urine culture pending. 5. Acute kidney injury- likely due to dehydration- pt with decreased oral intake at home per notes- creatinine down to 0.9 from 1.56. Continue to monitor 6. History of depressioncontinue Cymbalta 7. Severe malnutrition secondary to poor oral intakenutrition consult, ST following- failed MBS- daughter ok w/ comfort feeds 8. Bereavement with recent loss of wife secondary to colon cancer 9. Palliative Care consult- pt was with [facility]- per review of case management note- pt is not appropriate for home health and will not be taking pt back as a client. Appreciate assistance from Palliative Care. I did speak with daughter and stated if he is declining she would want him to be comfortable but would want him home- so she would need any resources available. Hospice consulted today. 10. Hx Bladder CA - monitor for s/s of urinary retention 11. PT/OT eval and treat 12. Disposition- d/c tomorrow @7255 with hospice. Hospital bed w/ full rails, gel overlay and bedside table to be delivered today. DNR 
  
Contact: Daughter Lacey Fabian [phone] // [phone]. I spoke with pt's daughter- discussed plan for discharge home tomorrow. She did not have any questions for at this time, instructed her to call back to the unit if needed. Case discussed with:  []Patient  [x]Family  [x]Nursing  [x]Case Management DVT Prophylaxis:  []Lovenox  [x]Hep SQ  [x]SCDs  []Coumadin   []On Heparin gtt Renée Kerr NP-C 
\[facility]\""ist Group 
pager [phone] Subjective: 
 
Pt seen and examined. Sleepy this am, arouses to verbal stimuli then falls back asleep. Plan for hospice consult per palliative care. Chief Complaint:  
Chief Complaint Patient presents with  Fall Unable to perform accurate ROS however pt denies chest pain, SOB, abdominal pain. Objective:  
 
Physical Exam: 
Visit Vitals BP (!) 163/94 Pulse 72 Temp 98.4 °F (36.9 °C) Resp 16 Ht 5' 6\" (1.676 m) Wt 50.8 kg (112 lb) SpO2 95% BMI 18.08 kg/m² General:         Alert, cooperative, no acute distress. Frail  male. HEENT: NC, Atraumatic. PERRLA, anicteric sclerae. Lungs: CTA Bilaterally. No Wheezing/Rhonchi/Rales. Heart:  Regular  rhythm,  No murmur, No Rubs, No Gallops Abdomen: Soft, Non distended, Non tender. +Bowel sounds, no HSM Extremities: No c/c/e. Scattered ecchymosis BUE and BLE. Mepilex to right elbow. Psych:   Good insight. Not anxious or agitated. Neurologic:  CN 2-12 grossly intact, Alert and oriented x 1. No acute neurological       
                         Deficits Intake and Output: 
Current Shift:  No intake/output data recorded. Last three shifts:  10/05 1901 - 10/07 0700 In: 240 [P.O.:240] Out: 365 [HRWLK:260] Labs: Results:  
   
Chemistry Recent Labs 10/07/20 
7942 10/06/20 
0157 10/05/20 
7631 * 90 143*  142 138  
K 4.4 3.1* 2.9*  
 101 96* CO2 30 30 29 BUN 29* 23* 27* CREA 0.84 0.90 1.56* CA 10.0 9.8 10.4* AGAP 7 11 13 BUCR 35* 26* 17  
AP 65 66  --   
TP 6.8 6.7  --   
ALB 3.6 2.8*  --   
GLOB 3.2 3.9  --   
AGRAT 1.1 0.7*  --   
  
CBC w/Diff Recent Labs 10/07/20 
9758 10/06/20 
0157 10/05/20 
6675 WBC 18.9* 13.1 15.0*  
RBC 4.57* 4.61* 4.95  
HGB 15.7 15.8 16.8*  
HCT 46.0 45.8 49.1*  
 224 241 GRANS 84* 82* 87* LYMPH 14* 11* 8* EOS 0 0 0 Cardiac Enzymes Recent Labs 10/05/20 
3534  CKND1 3.1 Coagulation No results for input(s): PTP, INR, APTT, INREXT, INREXT in the last 72 hours. Lipid Panel No results found for: CHOL, CHOLPOCT, CHOLX, CHLST, CHOLV, [id number], HDL, HDLP, LDL, LDLC, DLDLP, 872150, VLDLC, VLDL, TGLX, TRIGL, TRIGP, TGLPOCT, CHHD, CHHDX  
BNP No results for input(s): BNPP in the last 72 hours. Liver Enzymes Recent Labs 10/07/20 
1671 TP 6.8 ALB 3.6 AP 65 Thyroid Studies No results found for: T4, T3U, TSH, TSHEXT, TSHEXT Procedures/imaging: see electronic medical records for all procedures/Xrays and details which were not copied into this note but were reviewed prior to creation of Plan Medications:  
Current Facility-Administered Medications Medication Dose Route Frequency  cloNIDine HCL (CATAPRES) tablet 0.1 mg  0.1 mg Oral Q6H PRN  
 metoprolol tartrate (LOPRESSOR) tablet 25 mg  25 mg Oral Q12H  aspirin chewable tablet 81 mg  81 mg Oral DAILY  sodium chloride (NS) flush 5-40 mL  5-40 mL IntraVENous Q8H  
 sodium chloride (NS) flush 5-40 mL  5-40 mL IntraVENous PRN  
 acetaminophen (TYLENOL) tablet 650 mg  650 mg Oral Q6H PRN Or  
 acetaminophen (TYLENOL) suppository 650 mg  650 mg Rectal Q6H PRN  polyethylene glycol (MIRALAX) packet 17 g  17 g Oral DAILY PRN  
  ondansetron (ZOFRAN ODT) tablet 4 mg  4 mg Oral Q8H PRN  Or  
 ondansetron (ZOFRAN) injection 4 mg  4 mg IntraVENous Q6H PRN  
 heparin (porcine) injection 5,000 Units  5,000 Units SubCUTAneous Q8H  
 atorvastatin (LIPITOR) tablet 40 mg  40 mg Oral QHS  
 
 
 
10/7/2020 9:09 AM

## 2020-10-07 NOTE — HOSPICE
Spoke with Link Moreno, pt's daughter via phone Discussed [facility] philosophy, services, criteria, and IDT. Discussed caregiver need for round the clock care with Link Moreno 
primary caregiver identified as Link Moreno Caregiver concerns identified as n/a Answered all questions. Link Moreno advised she has to move furniture to make space for the hospital bed. Request delivery after 4pm today (works from home) and would like early as possible transport home in the morning. Provied with 24/7 contact information. Hospice referral received. Chart review in process. Thank you for the referral to [facility]. If we can be of further assistance please contact [phone] Ryan Houston RN [address], [address] [place], [facility] 
[phone] Email: [email or web address]

## 2020-10-07 NOTE — PROGRESS NOTES
OT order received and chart reviewed. Pt not seen for skilled OT due to: 
[]  Nausea/vomiting 
[]  Eating 
[]  Pain 
[x]  Pt lethargic/sleeping (First attempt at [phone]) []  Off Unit 
[] Speaking with hospital staff member 
[x] Other:  
Second attempt at [phone] Pt seen with PT to maximize safety of patient and staff members. Pt able to wake to tactile stimuli. When asked if he would want to participate in therapy, pt stated \"I just want to go\". Will continue to let the pt rest at this time. Will f/u later as schedule allows.   
 
Thank you for this referral. 
Jack Dalton MS, OTR/L

## 2020-10-07 NOTE — PROGRESS NOTES
Problem: Pressure Injury - Risk of 
Goal: *Prevention of pressure injury Description: Document Ricky Scale and appropriate interventions in the flowsheet. Outcome: Progressing Towards Goal 
Note: Pressure Injury Interventions: 
Sensory Interventions: Float heels, Assess changes in LOC, Minimize linen layers Moisture Interventions: Absorbent underpads, Limit adult briefs, Maintain skin hydration (lotion/cream), Minimize layers Activity Interventions: Increase time out of bed, Pressure redistribution bed/mattress(bed type) Mobility Interventions: HOB 30 degrees or less, Pressure redistribution bed/mattress (bed type) Nutrition Interventions: Document food/fluid/supplement intake, Discuss nutritional consult with provider Friction and Shear Interventions: HOB 30 degrees or less, Lift sheet, Minimize layers Problem: Falls - Risk of 
Goal: *Absence of Falls Description: Document Lul Mengs Fall Risk and appropriate interventions in the flowsheet. Outcome: Progressing Towards Goal 
Note: Fall Risk Interventions: 
Mobility Interventions: Bed/chair exit alarm, Patient to call before getting OOB Mentation Interventions: Bed/chair exit alarm, Door open when patient unattended, More frequent rounding, Reorient patient Elimination Interventions: Bed/chair exit alarm, Call light in reach History of Falls Interventions: Door open when patient unattended, Investigate reason for fall, Room close to nurse's station

## 2020-10-07 NOTE — CONSULTS
[phone] [place] [facility]: [phone] [facility]: [phone] Patient Name: Lyubov Meyers YOB: 1929 Date of Initial Consult: 10/6/2020, follow up 10/7/2020 Reason for Consult: goals of care Requesting Provider: Dr Maria Victoria Wood Primary Care Physician: Vicenta Gracia MD 
  
 SUMMARY:  
Lyubov Meyers is a 80y.o. year old with a past history of bladder cancer, hypertension, recent loss if his wife , who was admitted on 10/5/2020 from home with a diagnosis of a fall  Current medical issues leading to Palliative Medicine involvement include: 80year old male who has been declining in health. Increased weakness, poor appetite. Now with recent falls. Palliative medicine is consulted for support and care decisions. 10/7/2020 seen along with ST and PT. Sleepier this am, despite attempts to re position to achieve more wakefulness. Discussed with daughter Henrry Zurita. Hospice eval placed. If all agreeable and equipment in possible d/c later today. PALLIATIVE DIAGNOSES:  
1. Goals of care 2. Syncope ( fall) 3. Acute metabolic encephalopathy 4. Debility PLAN:  
1. 10/7/2020 follow up on Mr Ilsa Solomon this am. In bed sleepy, opens his eyes to his name but not as interactive as same time yesterday. ST and PT at bedside. At this time not safe or awake enough for orals. Noted MBS patient failed with aspiration on all textures. Still coughing with oral secretions. Increasing debility, confusion, poor po, aspiration. Discussed with his daughter Henrry Zurita, will ask hospice partners to see. ( consult placed). Discussed with attending team, if can be arranged with equipment in home can aim for d/c later today. Daughter is aware and agreeable. POST form discussed with Henrry Zurita, sent via e-mail for her docusignature. Patient is not able to participate in his medical decisions.  Goals of care DNR/DNI comfort on d/c no feeding tubes. Offered support to [facility] as this is a difficult journey and she has lost her mother in April. ( please see below for previous notes) 2. Goals of care see at bedside. Mr Brandi Lazaro is alert confused. Speech is a bit hard to understand. Attentive to examiner. Denies pain or shortness of breath. No AMD on file but his wife is  and he has one child Colby Slaughter. Called to [facility] she has been well updated on Mr Brandi Lazaro. Tells me his wife ( her mother) passed in April. He has been declining since then. Uses walker at home but day of fall he did not use. She has seen more confusion but much worse past several weeks. Noted speech recommendations with MBS tomorrow. Appreciate their involvement. Goals of care affirmed DNR/DNI [facility] stated her Dad would not wish for feeding tubes for any reason but would like to have MBS to eval for safest texture. At this point he looks very debilitated to me. Will await for MBS ? Hospice. All discussed with his daughter [facility]. Goals of care DNR/DNI limited interventions no feeding tubes. 3. Syncope vs fall d/t trip. CT of head -.  
4. Acute metabolic encephalopathy U/A with few bacteria, culture pending, increased confusion over past several weeks per daughter 5. Debility lives with his daughter [facility] on first floor. Uses rollator for ambulation, w/c for long distance. Daughter performs IADL's.  
6. Initial consult note routed to primary continuity provider 7. Communicated plan of care with: Palliative IDT, daughter [facility] , speech, attending Patient/Health Care Proxy Stated Goals: (hospice / comfort on d/c) TREATMENT PREFERENCES:  
Code Status: DNR/ DNI Advance Care Planning: 
[] The [facility] Interdisciplinary Team has updated the ACP Navigator with [address] and Patient Capacity Primary Decision Maker [facility] Agent): Supplemental (Other) Decision Maker: Lyubov Salazar - Daughter - [phone] Artificially Administered Nutrition: No feeding tube Other: As far as possible, the palliative care team has discussed with patient / health care proxy about goals of care / treatment preferences for patient. HISTORY:  
 
History obtained from: chart and daughter CHIEF COMPLAINT: syncope/ fall HPI/SUBJECTIVE: The patient is:  
[] Verbal and participatory [x] Non-participatory due to: confusion Please see summary Clinical Pain Assessment (nonverbal scale for nonverbal patients): Clinical Pain Assessment Severity: 0 Activity (Movement): Lying quietly, normal position Duration: for how long has pt been experiencing pain (e.g., 2 days, 1 month, years) Frequency: how often pain is an issue (e.g., several times per day, once every few days, constant) FUNCTIONAL ASSESSMENT:  
 
Palliative Performance Scale (PPS): 
30 ECOG 
ECOG Status : Completely disabled PSYCHOSOCIAL/SPIRITUAL SCREENING:  
  
Any spiritual / Uatsdin concerns: consulted spiritual care  
[x] Yes /  [] No 
 
Caregiver Burnout: 
[] Yes /  [x] No /  [] No Caregiver Present Anticipatory grief assessment: recent loss of his wife  
[] Normal  / [x] Maladaptive REVIEW OF SYSTEMS:  
 
Positive and pertinent negative findings in ROS are noted above in HPI. The following systems were [x] reviewed / [] unable to be reviewed as noted in HPI limited due to confusion Other findings are noted below. Systems: constitutional, ears/nose/mouth/throat, respiratory, gastrointestinal, genitourinary, musculoskeletal, integumentary, neurologic, psychiatric, endocrine. Positive findings noted below. Modified ESAS Completed by: provider Pain: 0 Dyspnea: 0 PHYSICAL EXAM:  
 
Wt Readings from Last 3 Encounters:  
10/05/20 50.8 kg (112 lb)  
09/15/20 50.8 kg (112 lb)  
08/20/20 59 kg (130 lb) Blood pressure 136/86, pulse 66, temperature 99 °F (37.2 °C), resp.  rate 16, height 5' 6\" (1.676 m), weight 50.8 kg (112 lb), SpO2 97 %. Pain: 
Pain Scale 1: Numeric (0 - 10) Pain Intensity 1: 0 Pain Onset 1: After fall(after fall) Pain Location 1: Shoulder Pain Orientation 1: Right Pain Description 1: Aching, Sore Pain Intervention(s) 1: Position, Relaxation technique Last bowel movement: none recorded Constitutional: in bed not as interactive as yesterday am, sleepy appears comfortable in NAD Respiratory: breathing not labored, on room air Skin: warm, dry Neurologic: alerts to his name, speech hard to understand, sleepier this am  
 
 
 HISTORY:  
 
Active Problems: Hypokalemia (10/5/2020) Fall (10/5/2020) Syncope and collapse (10/5/2020) Past Medical History:  
Diagnosis Date  Bladder cancer ([facility] [address] 2014  
 bladder  HTN (hypertension)  Hypercholesterolemia  Hypertension [employer] Walker as ambulation aid Past Surgical History:  
Procedure Laterality Date  HX CAROTID ENDARTERECTOMY  HX ROTATOR CUFF REPAIR Right No family history on file. History reviewed, no pertinent family history. Social History Tobacco Use  Smoking status: Never Smoker  Smokeless tobacco: Never Used Substance Use Topics  Alcohol use: Yes Frequency: Monthly or less Allergies Allergen Reactions  Aspirin Other (comments) Upset stomach  Myrbetriq [Mirabegron] Rash Current Facility-Administered Medications Medication Dose Route Frequency  cloNIDine HCL (CATAPRES) tablet 0.1 mg  0.1 mg Oral Q6H PRN  
 metoprolol tartrate (LOPRESSOR) tablet 25 mg  25 mg Oral Q12H  aspirin chewable tablet 81 mg  81 mg Oral DAILY  sodium chloride (NS) flush 5-40 mL  5-40 mL IntraVENous Q8H  
 sodium chloride (NS) flush 5-40 mL  5-40 mL IntraVENous PRN  
 acetaminophen (TYLENOL) tablet 650 mg  650 mg Oral Q6H PRN  Or  
 acetaminophen (TYLENOL) suppository 650 mg  650 mg Rectal Q6H PRN  
  polyethylene glycol (MIRALAX) packet 17 g  17 g Oral DAILY PRN  
 ondansetron (ZOFRAN ODT) tablet 4 mg  4 mg Oral Q8H PRN Or  
 ondansetron (ZOFRAN) injection 4 mg  4 mg IntraVENous Q6H PRN  
 heparin (porcine) injection 5,000 Units  5,000 Units SubCUTAneous Q8H  
 atorvastatin (LIPITOR) tablet 40 mg  40 mg Oral QHS  
 
 
 LAB AND IMAGING FINDINGS:  
 
Lab Results Component Value Date/Time WBC 18.9 (H) 10/07/2020 03:28 AM  
 HGB 15.7 10/07/2020 03:28 AM  
 PLATELET 491 90/72/9596 03:28 AM  
 
Lab Results Component Value Date/Time Sodium 140 10/07/2020 03:28 AM  
 Potassium 4.4 10/07/2020 03:28 AM  
 Chloride 103 10/07/2020 03:28 AM  
 CO2 30 10/07/2020 03:28 AM  
 BUN 29 (H) 10/07/2020 03:28 AM  
 Creatinine 0.84 10/07/2020 03:28 AM  
 Calcium 10.0 10/07/2020 03:28 AM  
 Magnesium 2.1 10/05/2020 06:40 AM  
  
Lab Results Component Value Date/Time Alk. phosphatase 65 10/07/2020 03:28 AM  
 Protein, total 6.8 10/07/2020 03:28 AM  
 Albumin 3.6 10/07/2020 03:28 AM  
 Globulin 3.2 10/07/2020 03:28 AM  
 
No results found for: INR, PTMR, PTP, PT1, PT2, APTT, INREXT, INREXT No results found for: IRON, FE, TIBC, IBCT, PSAT, FERR No results found for: PH, PCO2, PO2 No components found for: [place] Lab Results Component Value Date/Time  10/05/2020 06:40 AM  
 CK - MB 5.4 (H) 10/05/2020 06:40 AM  
  
 
   
 
Total time: 35 minutes Counseling / coordination time, spent as noted  above: 30 minutes  
> 50% counseling / coordination: yes with attending, daughter Geraldine Zacarias, hospice Prolonged service was provided for  []30 min   []75 min in face to face time in the presence of the patient, spent as noted above. Time Start:  
Time End:  
Note: this can only be billed with 46543 (initial) or 26878 (follow up). If multiple start / stop times, list each separately.

## 2020-10-07 NOTE — PROGRESS NOTES
PT treatment attempted at [address], pt with NP and ST. Pt able to complete ankle pumps to commands but requiring total assist for positioning in bed. Pt lethargic and not appropriate for PT at this time. Will follow up as schedule allows. 2nd attempt 1116, pt received sleeping in semi-reclined position but able to awake to tactile input. Shaking head \"no\" when asked to participate in PT session, pt reporting \"I just want to go. \" Pt left with all needs met, resting soundly. Will continue to follow.   
 
Thank you for this referral.  
Spring Boggs PT DPT

## 2020-10-07 NOTE — PROGRESS NOTES
Bedside shift change report given to Toñito Liz RN (oncoming nurse) by Amita Carey RN (offgoing nurse). Report included the following information SBAR, Kardex and MAR.

## 2020-10-07 NOTE — HOSPICE
Pt/family pursuing hospice:yes Admission dx approved by Hospice Medical Director: bladder cancer Conditions related to hospice diagnosis and contributing to terminal prognosis-dysphagia, metabolic encephalopathy, malnutrition, NSTEMI Anticipated hospital d/c date:10/8/20 Discussion occurred with patient and/or family about preference of hospitalization once admitted to hospice: yes Reviewed and discussed hospice philosophy and keeping the patient comfortable in their residence: yes (Document additional information below) Discussion with patient/family regarding around the clock caregiver in place due to patient safety in the home once discharged: yes Identified caregiver: (Document specifics discussed and/or any caregiver concerns identified). Narrative of events and/or assessment if applicable: [facility] [place] Hospital bed w/full rails, gel overlay, bedside table - delivery scheduled 3-7pm today - conf# [id number].   Transportation requested for 9am.

## 2020-10-07 NOTE — PROGRESS NOTES
Discharge planning: This writer received a call from [address], with [facility]. Patient's hospital bed will be delivered this evening and hospice is requesting a discharge time of 9AM. This writer has set up patient to be picked up at [facility], tomorrow (10/8/2020) by [facility]. The PCS form is on patient's chart. Catalino Knight NP has been made aware of this arrangement for tomorrow. This writer will continue to monitor for discharge planning to ensure a safe discharge home from [place]. Tanisha Fernandez. [facility] Care Manager Pager#: [phone]

## 2020-10-08 NOTE — PROGRESS NOTES
Discharge orded noted and reviewed. Pt is discharging home with hospice services through [facility]. Pt will be transported by [facility];  scheduled for 9am.  Five scripts in chart will go home with patient. No other needs identified. Case management available if needs arise. Ki Singh RN - Outcomes Manager  [phone]

## 2020-10-08 NOTE — DISCHARGE SUMMARY
Discharge Summary Patient: Crystal Mahajan MRN: [id number]  CSN: [id number] YOB: 1929  Age: 80 y.o. Sex: male DOA: 10/5/2020 LOS:  LOS: 3 days   Discharge Date: 10/8/2020 Admission Diagnoses: Fall [W19. Luis Enrique Patel Hypokalemia [E87.6] Syncope and collapse [R55] Discharge Diagnoses:   
 
Acute metabolic encephalopathy,? Underlying dementia Severe dysphagia NST elevation MI Syncope and collapse Leukocytosis Severe protein calorie malnutrition Failure to thrive Hypertension Hospice care patient DNR Discharge Condition: Stable PHYSICAL EXAM 
Visit Vitals /60 (BP 1 Location: Right arm, BP Patient Position: At rest) Pulse (!) 102 Temp 97 °F (36.1 °C) Resp 19 Ht 5' 6\" (1.676 m) Wt 50.8 kg (112 lb) SpO2 96% BMI 18.08 kg/m² General:         Alert, cooperative, no acute distress. Frail  male. HEENT:           NC, Atraumatic. PERRLA, anicteric sclerae. Lungs:            CTA Bilaterally. No Wheezing/Rhonchi/Rales. Heart:              Regular  rhythm,  No murmur, No Rubs, No Gallops Abdomen:      Soft, Non distended, Non tender.  +Bowel sounds, no HSM Extremities:   No c/c/e. Scattered ecchymosis BUE and BLE. Mepilex to right elbow. Psych:              Good insight. Not anxious or agitated. Neurologic:     CN 2-12 grossly intact, Alert and oriented x 1. No acute neurological       
                         Deficits 
  
 
Hospital Course: Per prior HPI, \"Jatinder Arenas is a 80 y.o. male who presented to the emergency room secondary to syncope and collapse. Patient is a poor historian and I spoke with the patient's daughter Jose M Rincon who mentions that she saw him lying in the bathroom this morning. Unclear if patient tripped on carpet or passed out. She mentions that the patient has been depressed and talks about dying every day. His wife recently  from colon cancer and the patient has been steadily declining. She also mentions that he has lost a lot of weight recently secondary to poor appetite. ER evaluation patient noted to have a mild elevation in troponin I 0.26cardiology consulted recommended echocardiogram and serial cardiac enzymes with conservative management given patient's age. Patient is currently being admitted to the hospital for further evaluation and treatment. We will consult palliative care for further input. \" Hospital Course Pt noted with acute metabolic encephalopathy ?dementia- daughter stated she noticed over time his confusion has increased and more frequent since losing his wife. UA with few bacteria, urine culture negative. Cardiology followed for elevated troponin but recommended medical management given age and pt's medical hx. There was report of emesis- ASA was held but CBC remained stable and no further emesis noted. ASA resumed per cardiology. ST evaluated pt for dysphagia- barium swallow note largyngeal penetration of all tested consistencies- ST recommended NPO or comfort feeds. Spoke with daughter and she mentioned pt would not want a PEG tube, she was ok with comfort feeds accepting responsibility that pt is at risk for aspiration, possible pneumonia, respiratory failure or even death. Palliative care consulted- daughter elected to proceed with hospice. He's remained hemodynamically stable. -pt very weak and fragile. Hospital bed with full rails, gel overlay and bedside table delivered to daughter's home yesterday. He will d/c home today with hospice. Consults: Palliative Care, ST Significant Diagnostic Studies: labs:  
Results for Nyla Mauricio (MRN [id number]) as of 10/8/2020 08:43 Ref. Range 10/7/2020 03:28 WBC Latest Ref Range: 4.6 - 13.2 K/uL 18.9 (H) RBC Latest Ref Range: 4.70 - 5.50 M/uL 4.57 (L) HGB Latest Ref Range: 13.0 - 16.0 g/dL 15.7 HCT Latest Ref Range: 36.0 - 48.0 % 46.0 MCV Latest Ref Range: 74.0 - 97.0 .7 (H) MCH Latest Ref Range: 24.0 - 34.0 PG 34.4 (H) MCHC Latest Ref Range: 31.0 - 37.0 g/dL 34.1 RDW Latest Ref Range: 11.6 - 14.5 % 14.7 (H) PLATELET Latest Ref Range: 135 - 420 K/uL 225 MPV Latest Ref Range: 9.2 - 11.8 FL 11.6 NEUTROPHILS Latest Ref Range: 42 - 75 % 84 (H) LYMPHOCYTES Latest Ref Range: 20 - 51 % 14 (L) MONOCYTES Latest Ref Range: 2 - 9 % 2 EOSINOPHILS Latest Ref Range: 0 - 5 % 0  
BASOPHILS Latest Ref Range: 0 - 3 % 0  
DF Latest Units:   MANUAL  
ABS. NEUTROPHILS Latest Ref Range: 1.8 - 8.0 K/UL 15.9 (H)  
ABS. LYMPHOCYTES Latest Ref Range: 0.8 - 3.5 K/UL 2.6  
ABS. MONOCYTES Latest Ref Range: 0 - 1.0 K/UL 0.4  
ABS. EOSINOPHILS Latest Ref Range: 0.0 - 0.4 K/UL 0.0  
ABS. BASOPHILS Latest Ref Range: 0.0 - 0.06 K/UL 0.0  
RBC COMMENTS Latest Units:   NORMOCYTIC, NORMOCHROMIC PLATELET COMMENTS Latest Units:   ADEQUATE PLATELETS Sodium Latest Ref Range: 136 - 145 mmol/L 140 Potassium Latest Ref Range: 3.5 - 5.5 mmol/L 4.4 Chloride Latest Ref Range: 100 - 111 mmol/L 103 CO2 Latest Ref Range: 21 - 32 mmol/L 30 Anion gap Latest Ref Range: 3.0 - 18 mmol/L 7 Glucose Latest Ref Range: 74 - 99 mg/dL 117 (H) BUN Latest Ref Range: 7.0 - 18 MG/DL 29 (H) Creatinine Latest Ref Range: 0.6 - 1.3 MG/DL 0.84 BUN/Creatinine ratio Latest Ref Range: 12 - 20   35 (H) Calcium Latest Ref Range: 8.5 - 10.1 MG/DL 10.0 GFR est non-AA Latest Ref Range: >60 ml/min/1.73m2 >60  
GFR est AA Latest Ref Range: >60 ml/min/1.73m2 >60 Bilirubin, total Latest Ref Range: 0.2 - 1.0 MG/DL 0.9 Protein, total Latest Ref Range: 6.4 - 8.2 g/dL 6.8 Albumin Latest Ref Range: 3.4 - 5.0 g/dL 3.6 Globulin Latest Ref Range: 2.0 - 4.0 g/dL 3.2 A-G Ratio Latest Ref Range: 0.8 - 1.7   1.1 ALT Latest Ref Range: 16 - 61 U/L 17 AST Latest Ref Range: 10 - 38 U/L 24 Alk. phosphatase Latest Ref Range: 45 - 117 U/L 65 Discharge Medications:    
Current Discharge Medication List  
  
 START taking these medications Details  
morphine (ROXANOL) 100 mg/5 mL (20 mg/mL) concentrated solution Take 0.5 mL by mouth every two (2) hours as needed for Pain for up to 3 days. Max Daily Amount: 120 mg. 
Qty: 15 mL, Refills: 0 Associated Diagnoses: Hospice care patient  
  
metoprolol tartrate (LOPRESSOR) 25 mg tablet Take 1 Tab by mouth every twelve (12) hours for 30 days. Qty: 60 Tab, Refills: 0  
  
aspirin 81 mg chewable tablet Take 1 Tab by mouth daily. Qty: 30 Tab, Refills: 0  
  
scopolamine (TRANSDERM-SCOP) 1 mg over 3 days pt3d 1 Patch by TransDERmal route every seventy-two (72) hours. Qty: 1 Patch, Refills: 0 LORazepam (LORazepam IntensoL) 2 mg/mL concentrated solution 0.25 mL by SubLINGual route every two (2) hours as needed for Agitation, Anxiety or Restlessness. Max Daily Amount: 6 mg. Qty: 10 mL, Refills: 0 Associated Diagnoses: Hospice care patient STOP taking these medications DULoxetine (CYMBALTA) 30 mg capsule Comments:  
Reason for Stopping:   
   
 acetaminophen (TYLENOL PO) Comments:  
Reason for Stopping:   
   
 peg 400-propylene glycol (Systane, propylene glycol,) 0.4-0.3 % drop Comments:  
Reason for Stopping:   
   
 erythromycin (ILOTYCIN) ophthalmic ointment Comments:  
Reason for Stopping:   
   
 fluticasone (FLONASE) 50 mcg/actuation nasal spray Comments:  
Reason for Stopping:   
   
  
 
 
 
Activity: activity as tolerated Diet: Comfort feeding Wound Care: Reinforce dressing PRN Follow-up: with hospice Minutes spent on discharge: >30 minutes spent coordinating this discharge (review instructions/follow-up, prescriptions, preparing report for sign off) RACHEL RasmussenC 
[facility] 
pager [phone]

## 2020-10-08 NOTE — DISCHARGE INSTRUCTIONS
Patient Education        Learning About Hospice and Palliative Care  What are hospice and palliative care? Palliative (say \"PAL-robert-uh-tiv\") care is an area of medicine that helps give you more good days by providing care for quality-of-life issues. It includes treating symptoms like pain, nausea, or sleep problems. It can also include helping you and your loved ones to:  · Understand your illness better. · Talk more openly about your feelings. · Decide what treatments you want or don't want. · Communicate better with your doctors, nurses, and each other. Hospice care is a type of palliative care. But it's for people who are near the end of life. What kinds of care are involved? Palliative care: This treatment helps you feel better physically, emotionally, and spiritually while doctors also treat your illness. Your care may include pain relief, counseling, or nutrition advice. Hospice care: Again, the goal of this type of care is to help you feel better. And it can help you get the most out of the time you have left. But you no longer get treatment to try to cure your illness. When does care happen? Palliative care: This care can happen at any time during a serious illness. You don't have to be near death to get this care. Hospice care: In most cases, you can choose hospice care when your doctor believes that you have no more than about 6 months to live. Where does the care happen? Palliative care: This care often happens in hospitals or long-term care facilities like nursing homes. It can take place wherever you are treated, even in your home. Hospice care: Most hospice care is done in the place the patient calls \"home. \" This is often the person's home. But it could also be a place like a nursing home or long-term center. Hospice care may also be given in hospice centers, hospitals, and other places. Who provides the care? Palliative care:  There are doctors and nurses who specialize in this field. But your own doctor may also give some of this care. And there are many other experts who may help you. These include social workers, counselors, therapists, and nutrition experts. Hospice care: In hospitals, hospice centers, and other facilities, care is given by doctors, nurses, and others who are trained in hospice care. In the home, a family member is often the main caregiver. But the family member gets help from care experts. They are on call 24 hours a day. Where can you learn more? Go to http://www.gray.com/  Enter D515 in the search box to learn more about \"Learning About Hospice and Palliative Care. \"  Current as of: December 9, 2019               Content Version: 12.6  © 8575-6471 [employer]. Care instructions adapted under license by [employer] (which disclaims liability or warranty for this information). If you have questions about a medical condition or this instruction, always ask your healthcare professional. [place] [address] any warranty or liability for your use of this information. Patient Education        Hypokalemia: Care Instructions  Your Care Instructions     Hypokalemia (say \"sn-ao-wtv-MAVERICK-yaneth-uh\") is a low level of potassium. The heart, muscles, kidneys, and nervous system all need potassium to work well. This problem has many different causes. Kidney problems, diet, and medicines like diuretics and laxatives can cause it. So can vomiting or diarrhea. In some cases, cancer is the cause. Your doctor may do tests to find the cause of your low potassium levels. You may need medicines to bring your potassium levels back to normal. You may also need regular blood tests to check your potassium. If you have very low potassium, you may need intravenous (IV) medicines. You also may need tests to check the electrical activity of your heart.  Heart problems caused by low potassium levels can be very serious. Follow-up care is a key part of your treatment and safety. Be sure to make and go to all appointments, and call your doctor if you are having problems. It's also a good idea to know your test results and keep a list of the medicines you take. How can you care for yourself at home? · If your doctor recommends it, eat foods that have a lot of potassium. These include fresh fruits, juices, and vegetables. They also include nuts, beans, and milk. · Be safe with medicines. If your doctor prescribes medicines or potassium supplements, take them exactly as directed. Call your doctor if you have any problems with your medicines. · Get your potassium levels tested as often as your doctor tells you. When should you call for help? Call 911 anytime you think you may need emergency care. For example, call if:    · You feel like your heart is missing beats. Heart problems caused by low potassium can cause death.     · You passed out (lost consciousness).     · You have a seizure. Call your doctor now or seek immediate medical care if:    · You feel weak or unusually tired.     · You have severe arm or leg cramps.     · You have tingling or numbness.     · You feel sick to your stomach, or you vomit.     · You have belly cramps.     · You feel bloated or constipated.     · You have to urinate a lot.     · You feel very thirsty most of the time.     · You are dizzy or lightheaded, or you feel like you may faint.     · You feel depressed, or you lose touch with reality. Watch closely for changes in your health, and be sure to contact your doctor if:    · You do not get better as expected. Where can you learn more? Go to http://www.gray.com/  Enter G358 in the search box to learn more about \"Hypokalemia: Care Instructions. \"  Current as of: March 31, 2020               Content Version: 12.6  © 5385-6651 [employer].    Care instructions adapted under license by [facility] (which disclaims liability or warranty for this information). If you have questions about a medical condition or this instruction, always ask your healthcare professional. [address] any warranty or liability for your use of this information. Patient Education        Vasovagal Syncope: Care Instructions  Your Care Instructions     Vasovagal syncope (say \"qjo-cwq-ETB-leidy DICKINSONVSAC-uuv-ksu\")is sudden dizziness or fainting that can be set off by things such as pain, stress, fear, or trauma. You may sweat or feel lightheaded, sick to your stomach, or tingly. The problem causes the heart rate to slow and the blood vessels to widen, or dilate, for a short time. When this happens, blood pools in the lower body, and less blood goes to the brain. You can usually get relief by lying down with your legs raised (elevated). This helps more blood to flow to your brain and may help relieve symptoms like feeling dizzy. Some doctors may recommend a technique that involves tensing your fists and arms. This type of fainting is often easy to predict. For example, it happens to some people when they see blood or have to get a shot. They may feel symptoms before they faint. An episode of vasovagal syncope usually responds well to self-care. Other treatment often isn't needed. But if the fainting keeps happening, your doctor may suggest further treatments. Follow-up care is a key part of your treatment and safety. Be sure to make and go to all appointments, and call your doctor if you are having problems. It's also a good idea to know your test results and keep a list of the medicines you take. How can you care for yourself at home? · Drink plenty of fluids to prevent dehydration. If you have kidney, heart, or liver disease and have to limit fluids, talk with your doctor before you increase your fluid intake. · Try to avoid things that you think may set off vasovagal syncope.   · Talk to your doctor about any medicines you take. Some medicines may increase the chance of this condition occurring. · If you feel symptoms, lie down with your legs raised. Talk to your doctor about what to do if your symptoms come back. When should you call for help? Call 911 anytime you think you may need emergency care. For example, call if:    · You have symptoms of a heart problem. These may include:  ? Chest pain or pressure. ? Severe trouble breathing. ? A fast or irregular heartbeat. Watch closely for changes in your health, and be sure to contact your doctor if:    · You have more episodes of fainting at home.     · You do not get better as expected. Where can you learn more? Go to http://www.gray.com/  Enter L754 in the search box to learn more about \"Vasovagal Syncope: Care Instructions. \"  Current as of: June 26, 2019               Content Version: 12.6  © 5763-6756 [employer]. Care instructions adapted under license by [employer] (which disclaims liability or warranty for this information). If you have questions about a medical condition or this instruction, always ask your healthcare professional. [place] [address] any warranty or liability for your use of this information. Patient armband removed and shredded        DISCHARGE SUMMARY from Nurse    PATIENT INSTRUCTIONS:    After general anesthesia or intravenous sedation, for 24 hours or while taking prescription Narcotics:  · Limit your activities  · Do not drive and operate hazardous machinery  · Do not make important personal or business decisions  · Do  not drink alcoholic beverages  · If you have not urinated within 8 hours after discharge, please contact your surgeon on call.     Report the following to your surgeon:  · Excessive pain, swelling, redness or odor of or around the surgical area  · Temperature over 100.5  · Nausea and vomiting lasting longer than 4 hours or if unable to take medications  · Any signs of decreased circulation or nerve impairment to extremity: change in color, persistent  numbness, tingling, coldness or increase pain  · Any questions    What to do at Home:  Recommended activity: Activity as tolerated    If you experience any of the following symptoms Nausea, Vomiting, Diarrhea, Fever greater than 100.5, Dizziness, Severe headache, Shortness of breath, Chest pain, Increased pain, please follow up with PCP. *  Please give a list of your current medications to your Primary Care Provider. *  Please update this list whenever your medications are discontinued, doses are      changed, or new medications (including over-the-counter products) are added. *  Please carry medication information at all times in case of emergency situations. These are general instructions for a healthy lifestyle:    No smoking/ No tobacco products/ Avoid exposure to second hand smoke  Surgeon General's Warning:  Quitting smoking now greatly reduces serious risk to your health. Obesity, smoking, and sedentary lifestyle greatly increases your risk for illness    A healthy diet, regular physical exercise & weight monitoring are important for maintaining a healthy lifestyle    You may be retaining fluid if you have a history of heart failure or if you experience any of the following symptoms:  Weight gain of 3 pounds or more overnight or 5 pounds in a week, increased swelling in our hands or feet or shortness of breath while lying flat in bed. Please call your doctor as soon as you notice any of these symptoms; do not wait until your next office visit. The discharge information has been reviewed with the patient. The patient verbalized understanding.   Discharge medications reviewed with the patient and appropriate educational materials and side effects teaching were provided.   ___________________________________________________________________________________________________________________________________

## 2020-10-08 NOTE — PROGRESS NOTES
Shift Summary Note: Assumed care of patient resting in bed. Remains confused and altered, no s/s of acute distress, call bell within reach. Patient Vitals for the past 12 hrs: 
 Temp Pulse Resp BP SpO2  
10/08/20 0516 97 °F (36.1 °C) (!) 102 19 113/60 96 % 10/08/20 0020 98.7 °F (37.1 °C) 90 18 (!) 165/83 96 % 10/07/20 2039 99 °F (37.2 °C) 92 18 (!) 162/99 96 %

## 2020-10-08 NOTE — ROUTINE PROCESS
Bedside and Verbal shift change report given to [facility] (oncoming nurse) by Sarahi Prieto RN (offgoing nurse). Report included the following information SBAR, Kardex, Intake/Output, MAR and Recent Results.

## 2020-10-08 NOTE — PROGRESS NOTES
Patient's belongings, patient education, patient paper RX, and patient transported via [facility].

## 2020-10-09 PROBLEM — Z51.5 HOSPICE CARE: Status: ACTIVE | Noted: 2020-01-01

## 2020-10-29 ENCOUNTER — TELEPHONE (OUTPATIENT)
Dept: FAMILY MEDICINE CLINIC | Age: 85
End: 2020-10-29

## 2020-10-29 NOTE — TELEPHONE ENCOUNTER
Lynda Maldonado from [facility] called about a Physicians order dated 10/2 that was faxed over three and hand delivered on 10/27. Lynda Maldonado needs the order signed and faxed back to her at [phone].

## (undated) DEVICE — DRAPE TWL SURG 16X26IN BLU ORB04] ALLCARE INC]

## (undated) DEVICE — CUFF BLD PRESSURE MONITORING LNG AD 23-33 CM 1 TUBE MY CUF

## (undated) DEVICE — MIRAGE SWIFT II PILLOW LGE: Brand: MIRAGE SWIFT II

## (undated) DEVICE — AVANOS* SHORT BEVEL NEEDLE: Brand: AVANOS

## (undated) DEVICE — (D)BNDG ADHESIVE FABRIC 3/4X3 -- DISC BY MFR USE ITEM 357960

## (undated) DEVICE — TRAY SUPP STD NO DRUG W EXTENSION SET